# Patient Record
Sex: FEMALE | Race: WHITE | NOT HISPANIC OR LATINO | Employment: FULL TIME | URBAN - METROPOLITAN AREA
[De-identification: names, ages, dates, MRNs, and addresses within clinical notes are randomized per-mention and may not be internally consistent; named-entity substitution may affect disease eponyms.]

---

## 2017-02-28 DIAGNOSIS — I10 ESSENTIAL HYPERTENSION, BENIGN: ICD-10-CM

## 2017-02-28 NOTE — TELEPHONE ENCOUNTER
atenolol (TENORMIN) 50 MG      Last Written Prescription Date: 12/28/15  Last Fill Quantity: 90, # refills: 3    Last Office Visit with [facility], [facility] or [facility] prescribing provider:  5/9/16   Future Office Visit:        BP Readings from Last 3 Encounters:   05/09/16 122/66   12/01/15 116/70   07/22/15 118/82

## 2017-03-01 RX ORDER — ATENOLOL 50 MG/1
50 TABLET ORAL DAILY
Qty: 30 TABLET | Refills: 0 | Status: SHIPPED | OUTPATIENT
Start: 2017-03-01 | End: 2017-09-19

## 2017-03-01 NOTE — TELEPHONE ENCOUNTER
LM on VM to call back. Pt. Needs labs and OV.     Medication is being filled for 1 time refill only due to:  Future labs ordered and requires 3 month f/u from last Virtual visit in Nov.     Prescription approved per [facility] Refill Protocol.      Krista Boyle, RN, BSN, PHN

## 2017-03-20 ENCOUNTER — OFFICE VISIT (OUTPATIENT)
Dept: FAMILY MEDICINE | Facility: CLINIC | Age: 57
End: 2017-03-20
Payer: COMMERCIAL

## 2017-03-20 VITALS
HEIGHT: 69 IN | SYSTOLIC BLOOD PRESSURE: 122 MMHG | HEART RATE: 65 BPM | WEIGHT: 220 LBS | RESPIRATION RATE: 12 BRPM | BODY MASS INDEX: 32.58 KG/M2 | DIASTOLIC BLOOD PRESSURE: 71 MMHG | TEMPERATURE: 98.1 F

## 2017-03-20 DIAGNOSIS — H65.00 ACUTE SEROUS OTITIS MEDIA, RECURRENCE NOT SPECIFIED, UNSPECIFIED LATERALITY: Primary | ICD-10-CM

## 2017-03-20 PROCEDURE — 99213 OFFICE O/P EST LOW 20 MIN: CPT | Performed by: FAMILY MEDICINE

## 2017-03-20 RX ORDER — AZITHROMYCIN 250 MG/1
TABLET, FILM COATED ORAL
Qty: 6 TABLET | Refills: 0 | Status: SHIPPED | OUTPATIENT
Start: 2017-03-20 | End: 2017-04-07

## 2017-03-20 NOTE — MR AVS SNAPSHOT
After Visit Summary   3/20/2017    Felisha Gorman    MRN: [id number]           Patient Information     Date Of Birth          1960        Visit Information        Provider Department      3/20/2017 5:15 PM Flex Simpson MD [facility]        Today's Diagnoses     Acute serous otitis media, recurrence not specified, unspecified laterality    -  1       Follow-ups after your visit        Your next 10 appointments already scheduled     Mar 24, 2017  4:10 PM CDT   SHORT with Aubrie Antoine MD   [facility]    [address] [address] [phone]              Who to contact     If you have questions or need follow up information about today's clinic visit or your schedule please contact [facility] directly at [phone].  Normal or non-critical lab and imaging results will be communicated to you by MyChart, letter or phone within 4 business days after the clinic has received the results. If you do not hear from us within 7 days, please contact the clinic through MyChart or phone. If you have a critical or abnormal lab result, we will notify you by phone as soon as possible.  Submit refill requests through [employer] or call your pharmacy and they will forward the refill request to us. Please allow 3 business days for your refill to be completed.          Additional Information About Your Visit        MyChart Information     [employer] gives you secure access to your electronic health record. If you see a primary care provider, you can also send messages to your care team and make appointments. If you have questions, please call your primary care clinic.  If you do not have a primary care provider, please call [phone] and they will assist you.        Care EveryWhere ID     This is your Care EveryWhere ID. This could be used by other organizations to access your [facility]  "records  [id number]        Your Vitals Were     Pulse Temperature Respirations Height Last Period BMI (Body Mass Index)    65 98.1  F (36.7  C) (Oral) 12 5' 9\" (1.753 m) 09/29/2006 32.49 kg/m2       Blood Pressure from Last 3 Encounters:   03/20/17 122/71   05/09/16 122/66   12/01/15 116/70    Weight from Last 3 Encounters:   03/20/17 220 lb (99.8 kg)   05/09/16 229 lb (103.9 kg)   12/01/15 230 lb (104.3 kg)              Today, you had the following     No orders found for display         Today's Medication Changes          These changes are accurate as of: 3/20/17  5:18 PM.  If you have any questions, ask your nurse or doctor.               Start taking these medicines.        Dose/Directions    azithromycin 250 MG tablet   Commonly known as:  ZITHROMAX   Used for:  Acute serous otitis media, recurrence not specified, unspecified laterality   Started by:  Flex Simpson MD        Two tablets first day, then one tablet daily for four days.   Quantity:  6 tablet   Refills:  0            Where to get your medicines      These medications were sent to [facility] [address] [place] [address]  AT [address] [address]     Phone:  [phone]     azithromycin 250 MG tablet                Primary Care Provider Office Phone # Fax #    Aubrie Antoine [phone]       [facility] [address] [facility] [address]        Thank you!     Thank you for choosing [facility]  for your care. Our goal is always to provide you with excellent care. Hearing back from our patients is one way we can continue to improve our services. Please take a few minutes to complete the written survey that you may receive in the mail after your visit with us. Thank you!             Your Updated Medication List - Protect others around you: Learn how to safely use, store and throw away your medicines at " www.disposemymeds.org.          This list is accurate as of: 3/20/17  5:18 PM.  Always use your most recent med list.                   Brand Name Dispense Instructions for use    albuterol 108 (90 BASE) MCG/ACT Inhaler    PROAIR HFA/PROVENTIL HFA/VENTOLIN HFA    1 Inhaler    Inhale 2 puffs into the lungs every 6 hours as needed for shortness of breath / dyspnea or wheezing       atenolol 50 MG tablet    TENORMIN    30 tablet    Take 1 tablet (50 mg) by mouth daily       azithromycin 250 MG tablet    ZITHROMAX    6 tablet    Two tablets first day, then one tablet daily for four days.       buPROPion 150 MG 24 hr tablet    WELLBUTRIN XL    90 tablet    Take 1 tablet (150 mg) by mouth every morning       FLUoxetine 20 MG capsule    PROzac    90 capsule    Take 1 capsule (20 mg) by mouth daily       fluticasone 220 MCG/ACT Inhaler    FLOVENT HFA    1 Inhaler    2 puffs bid and rinse and spit after use       fluticasone 50 MCG/ACT spray    FLONASE    3 Package    Spray 1-2 sprays into both nostrils daily       triamterene-hydrochlorothiazide 37.5-25 MG per capsule    DYAZIDE    30 capsule    Take one tablet by mouth daily

## 2017-03-20 NOTE — PROGRESS NOTES
SUBJECTIVE:                                                    Felisha Gorman is a 57 year old female who presents to clinic today for the following health issues:      RESPIRATORY SYMPTOMS      Duration: 1 month    Description  nasal congestion, cough, chills, ear pain right and headache    Severity: 5/10    Accompanying signs and symptoms: can't hear out of the right ear    History (predisposing factors):  strep exposure 1 month ago    Precipitating or alleviating factors: None    Therapies tried and outcome:  Doxycycline       Past Medical History   Diagnosis Date     Allergy, unspecified not elsewhere classified      Dysthymic disorder      Essential hypertension, benign      flight anxiety      Frequent BRONCHITIS      usually in winter     Impaired fasting glucose 9/23/2007     Glucose 101 9/07     Mild intermittent asthma      URI induced     Obstructive sleep apnea (adult) (pediatric)      not on CPAP; feels rested 2014     Other and unspecified hyperlipidemia      Rheumatic fever without mention of heart involvement      no sequelae     transient neurologic sx 2005     ? TIA; had MRI with temporal lobe lesion that is getting smaller       Past Surgical History   Procedure Laterality Date     C nonspecific procedure  1/02     nasal polyp removed     Surgical history of -   1/06     endometrial ablation     Tonsillectomy       Colonoscopy  7/09     hyperplastic polyp; repeat 10 years       Family History   Problem Relation Age of Onset     C.A.D. Brother      age 50     HEART DISEASE Brother      C.A.D. Mother      HEART DISEASE Mother      CANCER Mother      ovarian OK now     Cancer - colorectal Mother      cancerous polyp     C.A.D. Father      MI     HEART DISEASE Father        Social History   Substance Use Topics     Smoking status: Former Smoker     Packs/day: 2.00     Years: 10.00     Types: Cigarettes     Quit date: 12/24/1986     Smokeless tobacco: Never Used      Comment: many years ago      Alcohol use No     Patient complains of congestion with sore throat, nasal congestion and sinus pain. Some mucopurulant discharge but no upper dental pain and no sustained fever. Duration less than three weeks or more.  Has history of airborn allergy or asthma.   No chest pain, diaphoresis, or sob.  minimallyproductive cough.  TMs dull not *red, sinus tenderness right   lungs clear, s1s2,soft abd,no distress, cyanosis or stridor.  A:URI with asom right ear   P:fluids, antipyretics,rest and zpak as directed.  Recheck PRN worsening or non-improvement over 5 days.  Discussed signs of systemic or constutional illness.

## 2017-03-20 NOTE — NURSING NOTE
"Chief Complaint   Patient presents with     URI       Initial /71 (BP Location: Right arm, Patient Position: Chair, Cuff Size: Adult Large)  Pulse 65  Temp 98.1  F (36.7  C) (Oral)  Resp 12  Ht 5' 9\" (1.753 m)  Wt 220 lb (99.8 kg)  LMP 09/29/2006  BMI 32.49 kg/m2 Estimated body mass index is 32.49 kg/(m^2) as calculated from the following:    Height as of this encounter: 5' 9\" (1.753 m).    Weight as of this encounter: 220 lb (99.8 kg).  Medication Reconciliation: complete   Dennys Nuñez CMA       "

## 2017-04-07 ENCOUNTER — OFFICE VISIT (OUTPATIENT)
Dept: FAMILY MEDICINE | Facility: CLINIC | Age: 57
End: 2017-04-07
Payer: COMMERCIAL

## 2017-04-07 VITALS
WEIGHT: 232.3 LBS | RESPIRATION RATE: 16 BRPM | HEIGHT: 69 IN | HEART RATE: 67 BPM | OXYGEN SATURATION: 97 % | SYSTOLIC BLOOD PRESSURE: 136 MMHG | DIASTOLIC BLOOD PRESSURE: 82 MMHG | TEMPERATURE: 97.7 F | BODY MASS INDEX: 34.41 KG/M2

## 2017-04-07 DIAGNOSIS — R73.01 IMPAIRED FASTING GLUCOSE: ICD-10-CM

## 2017-04-07 DIAGNOSIS — E87.6 HYPOKALEMIA: ICD-10-CM

## 2017-04-07 DIAGNOSIS — I10 HYPERTENSION GOAL BP (BLOOD PRESSURE) < 140/90: ICD-10-CM

## 2017-04-07 DIAGNOSIS — F33.41 RECURRENT MAJOR DEPRESSIVE DISORDER, IN PARTIAL REMISSION (H): Primary | ICD-10-CM

## 2017-04-07 DIAGNOSIS — F41.1 ANXIETY STATE: ICD-10-CM

## 2017-04-07 DIAGNOSIS — E66.9 NON MORBID OBESITY, UNSPECIFIED OBESITY TYPE: ICD-10-CM

## 2017-04-07 LAB — HBA1C MFR BLD: 5.9 % (ref 4.3–6)

## 2017-04-07 PROCEDURE — 36415 COLL VENOUS BLD VENIPUNCTURE: CPT | Performed by: FAMILY MEDICINE

## 2017-04-07 PROCEDURE — 80053 COMPREHEN METABOLIC PANEL: CPT | Performed by: FAMILY MEDICINE

## 2017-04-07 PROCEDURE — 82043 UR ALBUMIN QUANTITATIVE: CPT | Performed by: FAMILY MEDICINE

## 2017-04-07 PROCEDURE — 83036 HEMOGLOBIN GLYCOSYLATED A1C: CPT | Performed by: FAMILY MEDICINE

## 2017-04-07 PROCEDURE — 99214 OFFICE O/P EST MOD 30 MIN: CPT | Performed by: FAMILY MEDICINE

## 2017-04-07 RX ORDER — METFORMIN HCL 500 MG
500 TABLET, EXTENDED RELEASE 24 HR ORAL
Qty: 60 TABLET | Refills: 0 | Status: SHIPPED | OUTPATIENT
Start: 2017-04-07 | End: 2018-04-12

## 2017-04-07 RX ORDER — FLUOXETINE 40 MG/1
40 CAPSULE ORAL DAILY
Qty: 30 CAPSULE | Refills: 0 | Status: SHIPPED | OUTPATIENT
Start: 2017-04-07 | End: 2017-09-19

## 2017-04-07 ASSESSMENT — ANXIETY QUESTIONNAIRES
GAD7 TOTAL SCORE: 4
6. BECOMING EASILY ANNOYED OR IRRITABLE: SEVERAL DAYS
7. FEELING AFRAID AS IF SOMETHING AWFUL MIGHT HAPPEN: NOT AT ALL
2. NOT BEING ABLE TO STOP OR CONTROL WORRYING: SEVERAL DAYS
IF YOU CHECKED OFF ANY PROBLEMS ON THIS QUESTIONNAIRE, HOW DIFFICULT HAVE THESE PROBLEMS MADE IT FOR YOU TO DO YOUR WORK, TAKE CARE OF THINGS AT HOME, OR GET ALONG WITH OTHER PEOPLE: SOMEWHAT DIFFICULT
1. FEELING NERVOUS, ANXIOUS, OR ON EDGE: SEVERAL DAYS
3. WORRYING TOO MUCH ABOUT DIFFERENT THINGS: NOT AT ALL
5. BEING SO RESTLESS THAT IT IS HARD TO SIT STILL: NOT AT ALL

## 2017-04-07 ASSESSMENT — PATIENT HEALTH QUESTIONNAIRE - PHQ9: 5. POOR APPETITE OR OVEREATING: SEVERAL DAYS

## 2017-04-07 NOTE — NURSING NOTE
"No chief complaint on file.      Initial /82 (BP Location: Right arm, Patient Position: Chair, Cuff Size: Adult Large)  Pulse 67  Temp 97.7  F (36.5  C) (Oral)  Resp 16  Ht 5' 9\" (1.753 m)  Wt 232 lb 4.8 oz (105.4 kg)  LMP 09/29/2006  SpO2 97%  BMI 34.3 kg/m2 Estimated body mass index is 34.3 kg/(m^2) as calculated from the following:    Height as of this encounter: 5' 9\" (1.753 m).    Weight as of this encounter: 232 lb 4.8 oz (105.4 kg).  Medication Reconciliation: complete   Flori Cole, CMA      "

## 2017-04-07 NOTE — PATIENT INSTRUCTIONS
I would like to see you again in 3-4 weeks.    I will let you know about your labs on MyChart as well.

## 2017-04-07 NOTE — MR AVS SNAPSHOT
After Visit Summary   4/7/2017    Felisha Gorman    MRN: [id number]           Patient Information     Date Of Birth          1960        Visit Information        Provider Department      4/7/2017 4:10 PM Aubrie Antoine MD [facility]        Today's Diagnoses     Major depressive disorder with single episode, in partial remission (H)    -  1    Impaired fasting glucose          Care Instructions    I would like to see you again in 3-4 weeks.    [facility] will [facility].            Follow-ups after your visit        Who to contact     If you have questions or need follow up information about today's clinic visit or your schedule please contact [facility] directly at [phone].  Normal or non-critical lab and imaging results will be communicated to you by Paper[employer]hart, letter or phone within 4 business days after the clinic has received the results. If you do not hear from us within 7 days, please contact the clinic through [employer] or phone. If you have a critical or abnormal lab result, we will notify you by phone as soon as possible.  Submit refill requests through [employer] or call your pharmacy and they will forward the refill request to us. Please allow 3 business days for your refill to be completed.          Additional Information About Your Visit        [employer]x Information     [employer] gives you secure access to your electronic health record. If you see a primary care provider, you can also send messages to your care team and make appointments. If you have questions, please call your primary care clinic.  If you do not have a primary care provider, please call [phone] and they will assist you.        Care EveryWhere ID     This is your Care EveryWhere ID. This could be used by other organizations to access your [facility] medical records  [id number]        Your Vitals Were     Pulse Temperature Respirations Height Last Period Pulse  "Oximetry    67 97.7  F (36.5  C) (Oral) 16 5' 9\" (1.753 m) 09/29/2006 97%    BMI (Body Mass Index)                   34.3 kg/m2            Blood Pressure from Last 3 Encounters:   04/07/17 136/82   03/20/17 122/71   05/09/16 122/66    Weight from Last 3 Encounters:   04/07/17 232 lb 4.8 oz (105.4 kg)   03/20/17 220 lb (99.8 kg)   05/09/16 229 lb (103.9 kg)              We Performed the Following     DEPRESSION ACTION PLAN (DAP)          Today's Medication Changes          These changes are accurate as of: 4/7/17  5:03 PM.  If you have any questions, ask your nurse or doctor.               Start taking these medicines.        Dose/Directions    metFORMIN 500 MG 24 hr tablet   Commonly known as:  GLUCOPHAGE-XR   Used for:  Impaired fasting glucose   Started by:  Aubrie Antoine MD        Dose:  500 mg   Take 1 tablet (500 mg) by mouth daily (with dinner) If doing well after 2 weeks, may increase to 2 tabs with dinner.   Quantity:  60 tablet   Refills:  0         These medicines have changed or have updated prescriptions.        Dose/Directions    FLUoxetine 40 MG capsule   Commonly known as:  PROzac   This may have changed:    - medication strength  - how much to take   Used for:  Major depressive disorder with single episode, in partial remission (H)   Changed by:  Aubrie Antoine MD        Dose:  40 mg   Take 1 capsule (40 mg) by mouth daily   Quantity:  30 capsule   Refills:  0       fluticasone 50 MCG/ACT spray   Commonly known as:  FLONASE   This may have changed:    - when to take this  - reasons to take this   Used for:  Allergy, unspecified not elsewhere classified        Dose:  1-2 spray   Spray 1-2 sprays into both nostrils daily   Quantity:  3 Package   Refills:  3         Stop taking these medicines if you haven't already. Please contact your care team if you have questions.     buPROPion 150 MG 24 hr tablet   Commonly known as:  WELLBUTRIN XL   Stopped by:  Aubrie Antoine MD                Where to get your " medicines      These medications were sent to [id number] - [place], [place] - [address] [facility] [address]  [address], [facility] [address]     Phone:  [phone]     FLUoxetine 40 MG capsule    metFORMIN 500 MG 24 hr tablet                Primary Care Provider Office Phone # Fax #    Aubrie Antoine [phone]       [facility] [address]  [facility] [address]        Thank you!     Thank you for choosing [facility]  for your care. Our goal is always to provide you with excellent care. Hearing back from our patients is one way we can continue to improve our services. Please take a few minutes to complete the written survey that you may receive in the mail after your visit with us. Thank you!             Your Updated Medication List - Protect others around you: Learn how to safely use, store and throw away your medicines at www.disposemymeds.org.          This list is accurate as of: 4/7/17  5:03 PM.  Always use your most recent med list.                   Brand Name Dispense Instructions for use    albuterol 108 (90 BASE) MCG/ACT Inhaler    PROAIR HFA/PROVENTIL HFA/VENTOLIN HFA    1 Inhaler    Inhale 2 puffs into the lungs every 6 hours as needed for shortness of breath / dyspnea or wheezing       atenolol 50 MG tablet    TENORMIN    30 tablet    Take 1 tablet (50 mg) by mouth daily       FLUoxetine 40 MG capsule    PROzac    30 capsule    Take 1 capsule (40 mg) by mouth daily       fluticasone 220 MCG/ACT Inhaler    FLOVENT HFA    1 Inhaler    2 puffs bid and rinse and spit after use       fluticasone 50 MCG/ACT spray    FLONASE    3 Package    Spray 1-2 sprays into both nostrils daily       metFORMIN 500 MG 24 hr tablet    GLUCOPHAGE-XR    60 tablet    Take 1 tablet (500 mg) by mouth daily (with dinner) If doing well after 2 weeks, may increase to 2 tabs with dinner.       triamterene-hydrochlorothiazide 37.5-25  MG per capsule    DYAZIDE    30 capsule    Take one tablet by mouth daily

## 2017-04-07 NOTE — LETTER
My Depression Action Plan  Name: Felisha Gorman   Date of Birth 1960  Date: 4/7/2017    My doctor: Aubrie Antoine   My clinic: [facility]  [address] [place] [address] [phone]          GREEN    ZONE   Good Control    What it looks like:     Things are going generally well. You have normal up s and down s. You may even feel depressed from time to time, but bad moods usually last less than a day.   What you need to do:  1. Continue to care for yourself (see self care plan)  2. Check your depression survival kit and update it as needed  3. Follow your physician s recommendations including any medication.  4. Do not stop taking medication unless you consult with your physician first.           YELLOW         ZONE Getting Worse    What it looks like:     Depression is starting to interfere with your life.     It may be hard to get out of bed; you may be starting to isolate yourself from others.    Symptoms of depression are starting to last most all day and this has happened for several days.     You may have suicidal thoughts but they are not constant.   What you need to do:     1. Call your care team, your response to treatment will improve if you keep your care team informed of your progress. Yellow periods are signs an adjustment may need to be made.     2. Continue your self-care, even if you have to fake it!    3. Talk to someone in your support network    4. Open up your depression survival kit           RED    ZONE Medical Alert - Get Help    What it looks like:     Depression is seriously interfering with your life.     You may experience these or other symptoms: You can t get out of bed most days, can t work or engage in other necessary activities, you have trouble taking care of basic hygiene, or basic responsibilities, thoughts of suicide or death that will not go away, self-injurious behavior.     What you need to do:  1. Call your care team and  request a same-day appointment. If they are not available (weekends or after hours) call your local crisis line, emergency room or 911.      Electronically signed by: Flori Cole, April 7, 2017    Depression Self Care Plan / Survival Kit    Self-Care for Depression  Here s the deal. Your body and mind are really not as separate as most people think.  What you do and think affects how you feel and how you feel influences what you do and think. This means if you do things that people who feel good do, it will help you feel better.  Sometimes this is all it takes.  There is also a place for medication and therapy depending on how severe your depression is, so be sure to consult with your medical provider and/ or Behavioral Health Consultant if your symptoms are worsening or not improving.     In order to better manage my stress, I will:    Exercise  Get some form of exercise, every day. This will help reduce pain and release endorphins, the  feel good  chemicals in your brain. This is almost as good as taking antidepressants!  This is not the same as joining a gym and then never going! (they count on that by the way ) It can be as simple as just going for a walk or doing some gardening, anything that will get you moving.      Hygiene   Maintain good hygiene (Get out of bed in the morning, Make your bed, Brush your teeth, Take a shower, and Get dressed like you were going to work, even if you are unemployed).  If your clothes don't fit try to get ones that do.    Diet  I will strive to eat foods that are good for me, drink plenty of water, and avoid excessive sugar, caffeine, alcohol, and other mood-altering substances.  Some foods that are helpful in depression are: complex carbohydrates, B vitamins, flaxseed, fish or fish oil, fresh fruits and vegetables.    Psychotherapy  I agree to participate in Individual Therapy (if recommended).    Medication  If prescribed medications, I agree to take them.  Missing doses  can result in serious side effects.  I understand that drinking alcohol, or other illicit drug use, may cause potential side effects.  I will not stop my medication abruptly without first discussing it with my provider.    Staying Connected With Others  I will stay in touch with my friends, family members, and my primary care provider/team.    Use your imagination  Be creative.  We all have a creative side; it doesn t matter if it s oil painting, sand castles, or mud pies! This will also kick up the endorphins.    Witness Beauty  (AKA stop and smell the roses) Take a look outside, even in mid-winter. Notice colors, textures. Watch the squirrels and birds.     Service to others  Be of service to others.  There is always someone else in need.  By helping others we can  get out of ourselves  and remember the really important things.  This also provides opportunities for practicing all the other parts of the program.    Humor  Laugh and be silly!  Adjust your TV habits for less news and crime-drama and more comedy.    Control your stress  Try breathing deep, massage therapy, biofeedback, and meditation. Find time to relax each day.     My support system    Clinic Contact:  Phone number:    Contact 1:  Phone number:    Contact 2:  Phone number:    Taoist/:  Phone number:    Therapist:  Phone number:    Local crisis center:    Phone number:    Other community support:  Phone number:

## 2017-04-07 NOTE — PROGRESS NOTES
SUBJECTIVE:                                                    Felisha Gorman is a 57 year old female who presents to clinic today for the following health issues:      Hypertension Follow-up      Outpatient blood pressures are not being checked.    Low Salt Diet: not monitoring salt-does not add salt       Depression and Anxiety Follow-Up    Status since last visit: No change    Other associated symptoms:None    Complicating factors:     Significant life event: Yes-  Life stressors- aging mother     Current substance abuse: None    PHQ-9 SCORE 5/9/2016 9/8/2016 11/29/2016   Total Score - - -   Total Score MyChart - 7 (Mild depression) -   Total Score 10 - 7     BOBBY-7 SCORE 12/1/2015 5/9/2016 11/29/2016   Total Score - - -   Total Score - - -   Total Score 3 4 1        PHQ-9  English      PHQ-9   Any Language     GAD7       Amount of exercise or physical activity: None    Problems taking medications regularly: No    Medication side effects: none    Diet: regular (no restrictions)          Problem list and histories reviewed & adjusted, as indicated.  Additional history:   See under ROS    Patient Active Problem List   Diagnosis     Allergic state     Dysthymic disorder     Mild intermittent asthma     Hypertension goal BP (blood pressure) < 140/90     Obstructive sleep apnea     Anxiety state     IMPAIRED FASTING GLUCOSE     Major depression in complete remission (H)     CARDIOVASCULAR SCREENING; LDL GOAL LESS THAN 160     Family history of ovarian cancer     Knee pain     Osteoarthritis, knee     Abnormal finding on MRI of brain     Radicular low back pain     Recurrent major depressive disorder, in partial remission (H)       Current Outpatient Prescriptions   Medication Sig Dispense Refill     triamterene-hydrochlorothiazide (DYAZIDE) 37.5-25 MG per capsule Take one tablet by mouth daily 30 capsule 0     atenolol (TENORMIN) 50 MG tablet Take 1 tablet (50 mg) by mouth daily 30 tablet 0     FLUoxetine (PROZAC)  "20 MG capsule Take 1 capsule (20 mg) by mouth daily 90 capsule 0     albuterol (PROAIR HFA, PROVENTIL HFA, VENTOLIN HFA) 108 (90 BASE) MCG/ACT inhaler Inhale 2 puffs into the lungs every 6 hours as needed for shortness of breath / dyspnea or wheezing 1 Inhaler 1     fluticasone (FLONASE) 50 MCG/ACT nasal spray Spray 1-2 sprays into both nostrils daily (Patient taking differently: Spray 1-2 sprays into both nostrils as needed ) 3 Package 3     fluticasone (FLOVENT HFA) 220 MCG/ACT inhaler 2 puffs bid and rinse and spit after use 1 Inhaler prn             Reviewed and updated as needed this visit by clinical staff  Tobacco  Allergies  Meds  Med Hx  Surg Hx  Fam Hx  Soc Hx      Reviewed and updated as needed this visit by Provider         ROS:  CONSTITUTIONAL:frustrated with difficulty losing weight.   RESP:NEGATIVE for significant cough or short of breath x lingering cough after cold.  CV: NEGATIVE for chest pain, palpitations or peripheral edema  PSYCHIATRIC: see below.    wellbutrin made her feel more anxious. So went off it.   Feeling a little better off it.  Still not feeling real motivated. Feeling blah. Not looking forward to anything.  Working hard.   Stressful job.   Feeling like it is chemical. Feeling the fluoxetine helping for her anxiety.    Still seeing a therapist.    OBJECTIVE:                                                    /82 (BP Location: Right arm, Patient Position: Chair, Cuff Size: Adult Large)  Pulse 67  Temp 97.7  F (36.5  C) (Oral)  Resp 16  Ht 5' 9\" (1.753 m)  Wt 232 lb 4.8 oz (105.4 kg)  LMP 09/29/2006  SpO2 97%  BMI 34.3 kg/m2  Body mass index is 34.3 kg/(m^2).  GENERAL APPEARANCE: alert and no distress  RESP: lungs clear to auscultation - no rales, rhonchi or wheezes  CV: regular rates and rhythm  MS: no edema.  PSYCH: mentation appears normal and affect normal/bright      PHQ-9 SCORE 9/8/2016 11/29/2016 4/7/2017   Total Score - - -   Total Score MyChart 7 (Mild " depression) - -   Total Score - 7 12       BOBBY-7 SCORE 5/9/2016 11/29/2016 4/7/2017   Total Score - - -   Total Score - - -   Total Score 4 1 4       GFR Estimate   Date Value Ref Range Status   12/01/2015 85 >60 mL/min/1.7m2 Final     Comment:     Non  GFR Calc   03/25/2014 78 >60 mL/min/1.7m2 Final   12/19/2012 82 >60 mL/min/1.7m2 Final         Lab Results   Component Value Date    A1C 6.5 12/01/2015    A1C 6.0 03/25/2014    A1C 6.0 08/19/2011    A1C 5.5 05/24/2008       Recent Labs   Lab Test  12/01/15   1054  03/25/14   0840  08/19/11   0824   CHOL  264*  175  223*   HDL  39*  32*  35*   LDL  161*  100  144*   TRIG  322*  217*  223*   CHOLHDLRATIO   --   5.5*  6.4*          ASSESSMENT/PLAN:                                                      Recurrent major depressive disorder, in partial remission (H)  At this time, discussed options. (increase dose, different medication (discussed other SSRI and SNRI), collaborative care). Currently will try increased dose.   - FLUoxetine (PROZAC) 40 MG capsule; Take 1 capsule (40 mg) by mouth daily    Anxiety state  Feels the fluoxetine is helpful. More anxious when on the wellbutrin too.     Impaired fasting glucose  Will recheck.  Discussed option of metformin; she did like the idea with regards to weight as well. Wanted to go ahead and start.   Discussed metformin in detail; discussing side effects including diarrhea and risk including lactic acidosis and its precipitants. Recommend holding med if getting dehydrated or with dye study.  - metFORMIN (GLUCOPHAGE-XR) 500 MG 24 hr tablet; Take 1 tablet (500 mg) by mouth daily (with dinner) If doing well after 2 weeks, may increase to 2 tabs with dinner.  - Comprehensive metabolic panel  - Hemoglobin A1c    Non morbid obesity, unspecified obesity type  As above.   - metFORMIN (GLUCOPHAGE-XR) 500 MG 24 hr tablet; Take 1 tablet (500 mg) by mouth daily (with dinner) If doing well after 2 weeks, may increase to 2  tabs with dinner.    Hypertension goal BP (blood pressure) < 140/90  Meeting goal. She notes this is a little high for her. Will continue to follow.  - Comprehensive metabolic panel  - Microalbumin quantitative random urine        Patient Instructions   I would like to see you again in 3-4 weeks.    I will let you know about your labs on MyChart as well.          Aubrie Antoine MD, MD  [facility]

## 2017-04-08 ENCOUNTER — TELEPHONE (OUTPATIENT)
Dept: FAMILY MEDICINE | Facility: CLINIC | Age: 57
End: 2017-04-08

## 2017-04-08 DIAGNOSIS — E87.6 HYPOKALEMIA: Primary | ICD-10-CM

## 2017-04-08 LAB
ALBUMIN SERPL-MCNC: 3.9 G/DL (ref 3.4–5)
ALP SERPL-CCNC: 100 U/L (ref 40–150)
ALT SERPL W P-5'-P-CCNC: 42 U/L (ref 0–50)
ANION GAP SERPL CALCULATED.3IONS-SCNC: 10 MMOL/L (ref 3–14)
AST SERPL W P-5'-P-CCNC: 18 U/L (ref 0–45)
BILIRUB SERPL-MCNC: 0.3 MG/DL (ref 0.2–1.3)
BUN SERPL-MCNC: 20 MG/DL (ref 7–30)
CALCIUM SERPL-MCNC: 9.2 MG/DL (ref 8.5–10.1)
CHLORIDE SERPL-SCNC: 101 MMOL/L (ref 94–109)
CO2 SERPL-SCNC: 27 MMOL/L (ref 20–32)
CREAT SERPL-MCNC: 0.76 MG/DL (ref 0.52–1.04)
CREAT UR-MCNC: 142 MG/DL
GFR SERPL CREATININE-BSD FRML MDRD: 78 ML/MIN/1.7M2
GLUCOSE SERPL-MCNC: 74 MG/DL (ref 70–99)
MICROALBUMIN UR-MCNC: 21 MG/L
MICROALBUMIN/CREAT UR: 15 MG/G CR (ref 0–25)
POTASSIUM SERPL-SCNC: 3 MMOL/L (ref 3.4–5.3)
PROT SERPL-MCNC: 7.9 G/DL (ref 6.8–8.8)
SODIUM SERPL-SCNC: 138 MMOL/L (ref 133–144)

## 2017-04-08 RX ORDER — POTASSIUM CHLORIDE 1500 MG/1
20 TABLET, EXTENDED RELEASE ORAL 2 TIMES DAILY
Qty: 14 TABLET | Refills: 0 | Status: SHIPPED | OUTPATIENT
Start: 2017-04-08 | End: 2017-12-03

## 2017-04-08 ASSESSMENT — PATIENT HEALTH QUESTIONNAIRE - PHQ9: SUM OF ALL RESPONSES TO PHQ QUESTIONS 1-9: 12

## 2017-04-08 ASSESSMENT — ASTHMA QUESTIONNAIRES: ACT_TOTALSCORE: 18

## 2017-04-08 ASSESSMENT — ANXIETY QUESTIONNAIRES: GAD7 TOTAL SCORE: 4

## 2017-04-08 NOTE — TELEPHONE ENCOUNTER
Please call her.     I did send a MyChart result note.  I want to make sure she saw it.    Her potassium was low and sent a prescription for taking some for a week. We can do her first follow up as she comes in for appointment in 3-4 weeks.    Thanks!

## 2017-04-10 PROBLEM — E66.9 OBESITY: Status: ACTIVE | Noted: 2017-04-10

## 2017-04-11 PROBLEM — E66.9 NON MORBID OBESITY, UNSPECIFIED OBESITY TYPE: Status: ACTIVE | Noted: 2017-04-11

## 2017-04-11 NOTE — TELEPHONE ENCOUNTER
Patient has not reviewed the my chart message.  Called patient to let her know lab results, and need to have potassium medication and lab recheck.   Herlinda Cesar, RN  Triage Nurse

## 2017-05-23 ENCOUNTER — TELEPHONE (OUTPATIENT)
Dept: FAMILY MEDICINE | Facility: CLINIC | Age: 57
End: 2017-05-23

## 2017-06-08 ENCOUNTER — OFFICE VISIT (OUTPATIENT)
Dept: FAMILY MEDICINE | Facility: CLINIC | Age: 57
End: 2017-06-08
Payer: COMMERCIAL

## 2017-06-08 VITALS
WEIGHT: 236.8 LBS | OXYGEN SATURATION: 96 % | HEART RATE: 56 BPM | SYSTOLIC BLOOD PRESSURE: 122 MMHG | HEIGHT: 69 IN | BODY MASS INDEX: 35.07 KG/M2 | TEMPERATURE: 97.7 F | DIASTOLIC BLOOD PRESSURE: 74 MMHG

## 2017-06-08 DIAGNOSIS — F32.5 MAJOR DEPRESSION IN COMPLETE REMISSION (H): Primary | ICD-10-CM

## 2017-06-08 DIAGNOSIS — I10 HYPERTENSION GOAL BP (BLOOD PRESSURE) < 140/90: ICD-10-CM

## 2017-06-08 DIAGNOSIS — E87.6 HYPOKALEMIA: ICD-10-CM

## 2017-06-08 DIAGNOSIS — F41.1 ANXIETY STATE: ICD-10-CM

## 2017-06-08 DIAGNOSIS — J45.20 MILD INTERMITTENT ASTHMA WITHOUT COMPLICATION: ICD-10-CM

## 2017-06-08 PROCEDURE — 99214 OFFICE O/P EST MOD 30 MIN: CPT | Performed by: FAMILY MEDICINE

## 2017-06-08 PROCEDURE — 84132 ASSAY OF SERUM POTASSIUM: CPT | Performed by: FAMILY MEDICINE

## 2017-06-08 PROCEDURE — 36415 COLL VENOUS BLD VENIPUNCTURE: CPT | Performed by: FAMILY MEDICINE

## 2017-06-08 ASSESSMENT — ANXIETY QUESTIONNAIRES
6. BECOMING EASILY ANNOYED OR IRRITABLE: NOT AT ALL
IF YOU CHECKED OFF ANY PROBLEMS ON THIS QUESTIONNAIRE, HOW DIFFICULT HAVE THESE PROBLEMS MADE IT FOR YOU TO DO YOUR WORK, TAKE CARE OF THINGS AT HOME, OR GET ALONG WITH OTHER PEOPLE: NOT DIFFICULT AT ALL
3. WORRYING TOO MUCH ABOUT DIFFERENT THINGS: NOT AT ALL
2. NOT BEING ABLE TO STOP OR CONTROL WORRYING: NOT AT ALL
GAD7 TOTAL SCORE: 0
5. BEING SO RESTLESS THAT IT IS HARD TO SIT STILL: NOT AT ALL
7. FEELING AFRAID AS IF SOMETHING AWFUL MIGHT HAPPEN: NOT AT ALL
1. FEELING NERVOUS, ANXIOUS, OR ON EDGE: NOT AT ALL

## 2017-06-08 ASSESSMENT — PATIENT HEALTH QUESTIONNAIRE - PHQ9: 5. POOR APPETITE OR OVEREATING: NOT AT ALL

## 2017-06-08 NOTE — MR AVS SNAPSHOT
"              After Visit Summary   6/8/2017    Felisha Gorman    MRN: [id number]           Patient Information     Date Of Birth          1960        Visit Information        Provider Department      6/8/2017 4:10 PM Aubrie Antoine MD [facility] [place]        Today's Diagnoses     Hypokalemia           Follow-ups after your visit        Who to contact     If you have questions or need follow up information about today's clinic visit or your schedule please contact [facility] directly at [phone].  Normal or non-critical lab and imaging results will be communicated to you by St[employer], letter or phone within 4 business days after the clinic has received the results. If you do not hear from us within 7 days, please contact the clinic through [employer] or phone. If you have a critical or abnormal lab result, we will notify you by phone as soon as possible.  Submit refill requests through [employer] or call your pharmacy and they will forward the refill request to us. Please allow 3 business days for your refill to be completed.          Additional Information About Your Visit        MyChart Information     [employer] gives you secure access to your electronic health record. If you see a primary care provider, you can also send messages to your care team and make appointments. If you have questions, please call your primary care clinic.  If you do not have a primary care provider, please call [phone] and they will assist you.        Care EveryWhere ID     This is your Care EveryWhere ID. This could be used by other organizations to access your [facility] medical records  [id number]        Your Vitals Were     Pulse Temperature Height Last Period Pulse Oximetry BMI (Body Mass Index)    56 97.7  F (36.5  C) (Oral) 5' 9\" (1.753 m) 09/29/2006 96% 34.97 kg/m2       Blood Pressure from Last 3 Encounters:   06/08/17 122/74   04/07/17 136/82   03/20/17 122/71    Weight from Last 3 Encounters: "   06/08/17 236 lb 12.8 oz (107.4 kg)   04/07/17 232 lb 4.8 oz (105.4 kg)   03/20/17 220 lb (99.8 kg)              We Performed the Following     Potassium          Today's Medication Changes          These changes are accurate as of: 6/8/17  4:56 PM.  If you have any questions, ask your nurse or doctor.               These medicines have changed or have updated prescriptions.        Dose/Directions    fluticasone 50 MCG/ACT spray   Commonly known as:  FLONASE   This may have changed:    - when to take this  - reasons to take this   Used for:  Allergy, unspecified not elsewhere classified        Dose:  1-2 spray   Spray 1-2 sprays into both nostrils daily   Quantity:  3 Package   Refills:  3                Primary Care Provider Office Phone # Fax #    Aubrie Antoine [phone]       [facility] [address] [facility] [address]        Thank you!     Thank you for choosing [facility]  for your care. Our goal is always to provide you with excellent care. Hearing back from our patients is one way we can continue to improve our services. Please take a few minutes to complete the written survey that you may receive in the mail after your visit with us. Thank you!             Your Updated Medication List - Protect others around you: Learn how to safely use, store and throw away your medicines at www.disposemymeds.org.          This list is accurate as of: 6/8/17  4:56 PM.  Always use your most recent med list.                   Brand Name Dispense Instructions for use    albuterol 108 (90 BASE) MCG/ACT Inhaler    PROAIR HFA/PROVENTIL HFA/VENTOLIN HFA    1 Inhaler    Inhale 2 puffs into the lungs every 6 hours as needed for shortness of breath / dyspnea or wheezing       atenolol 50 MG tablet    TENORMIN    30 tablet    Take 1 tablet (50 mg) by mouth daily       FLUoxetine 40 MG capsule    PROzac    30 capsule    Take 1 capsule (40 mg) by mouth daily       fluticasone 220  MCG/ACT Inhaler    FLOVENT HFA    1 Inhaler    2 puffs bid and rinse and spit after use       fluticasone 50 MCG/ACT spray    FLONASE    3 Package    Spray 1-2 sprays into both nostrils daily       metFORMIN 500 MG 24 hr tablet    GLUCOPHAGE-XR    60 tablet    Take 1 tablet (500 mg) by mouth daily (with dinner) If doing well after 2 weeks, may increase to 2 tabs with dinner.       potassium chloride SA 20 MEQ CR tablet    potassium chloride    14 tablet    Take 1 tablet (20 mEq) by mouth 2 times daily       triamterene-hydrochlorothiazide 37.5-25 MG per capsule    DYAZIDE    30 capsule    Take one tablet by mouth daily

## 2017-06-08 NOTE — LETTER
My Asthma Action Plan  Name: Felisha Gorman   YOB: 1960  Date: 6/8/2017   My doctor: Aubrie Antoine MD, MD   My clinic: [facility]        My Control Medicine: Fluticasone propionate (Flovent) -   mcg during a flare or bad season  My Rescue Medicine: Albuterol (Proair/Ventolin/Proventil) inhaler as needed.   My Asthma Severity: intermittent  Avoid your asthma triggers:                GREEN ZONE     Good Control    I feel good    No cough or wheeze    Can work, sleep and play without asthma symptoms       Take your asthma control medicine every day.     1. If exercise triggers your asthma, take your rescue medication    15 minutes before exercise or sports, and    During exercise if you have asthma symptoms  2. Spacer to use with inhaler: If you have a spacer, make sure to use it with your inhaler             YELLOW ZONE     Getting Worse  I have ANY of these:    I do not feel good    Cough or wheeze    Chest feels tight    Wake up at night   1. Keep taking your Green Zone medications  2. Start taking your rescue medicine:    every 20 minutes for up to 1 hour. Then every 4 hours for 24-48 hours.  3. If you stay in the Yellow Zone for more than 12-24 hours, contact your doctor.  4. If you do not return to the Green Zone in 12-24 hours or you get worse, start taking your oral steroid medicine if prescribed by your provider.           RED ZONE     Medical Alert - Get Help  I have ANY of these:    I feel awful    Medicine is not helping    Breathing getting harder    Trouble walking or talking    Nose opens wide to breathe       1. Take your rescue medicine NOW  2. If your provider has prescribed an oral steroid medicine, start taking it NOW  3. Call your doctor NOW  4. If you are still in the Red Zone after 20 minutes and you have not reached your doctor:    Take your rescue medicine again and    Call 911 or go to the emergency room right away    See your regular doctor within 2  weeks of an Emergency Room or Urgent Care visit for follow-up treatment.        Electronically signed by: Aubrie Antoine MD, June 8, 2017    Annual Reminders:  Meet with Asthma Educator,  Flu Shot in the Fall, consider Pneumonia Vaccination for patients with asthma (aged 19 and older).    Pharmacy:    [place], [place] - [address]  [id number] - [place], [place] - [address]  [facility] [address] [place] - [address] [facility] [address]  [email or web address] [address] [place], [place] - [address] [facility] [address]                    Asthma Triggers  How To Control Things That Make Your Asthma Worse    Triggers are things that make your asthma worse.  Look at the list below to help you find your triggers and what you can do about them.  You can help prevent asthma flare-ups by staying away from your triggers.      Trigger                                                          What you can do   Cigarette Smoke  Tobacco smoke can make asthma worse. Do not allow smoking in your home, car or around you.  Be sure no one smokes at a child s day care or school.  If you smoke, ask your health care provider for ways to help you quit.  Ask family members to quit too.  Ask your health care provider for a referral to Quit Plan to help you quit smoking, or call 7-734-045-PLAN.     Colds, Flu, Bronchitis  These are common triggers of asthma. Wash your hands often.  Don t touch your eyes, nose or mouth.  Get a flu shot every year.     Dust Mites  These are tiny bugs that live in cloth or carpet. They are too small to see. Wash sheets and blankets in hot water every week.   Encase pillows and mattress in dust mite proof covers.  Avoid having carpet if you can. If you have carpet, vacuum weekly.   Use a dust mask and HEPA vacuum.   Pollen and Outdoor Mold  Some people are allergic to trees, grass, or weed pollen, or molds. Try to  keep your windows closed.  Limit time out doors when pollen count is high.   Ask you health care provider about taking medicine during allergy season.     Animal Dander  Some people are allergic to skin flakes, urine or saliva from pets with fur or feathers. Keep pets with fur or feathers out of your home.    If you can t keep the pet outdoors, then keep the pet out of your bedroom.  Keep the bedroom door closed.  Keep pets off cloth furniture and away from stuffed toys.     Mice, Rats, and Cockroaches  Some people are allergic to the waste from these pests.   Cover food and garbage.  Clean up spills and food crumbs.  Store grease in the refrigerator.   Keep food out of the bedroom.   Indoor Mold  This can be a trigger if your home has high moisture. Fix leaking faucets, pipes, or other sources of water.   Clean moldy surfaces.  Dehumidify basement if it is damp and smelly.   Smoke, Strong Odors, and Sprays  These can reduce air quality. Stay away from strong odors and sprays, such as perfume, powder, hair spray, paints, smoke incense, paint, cleaning products, candles and new carpet.   Exercise or Sports  Some people with asthma have this trigger. Be active!  Ask your doctor about taking medicine before sports or exercise to prevent symptoms.    Warm up for 5-10 minutes before and after sports or exercise.     Other Triggers of Asthma  Cold air:  Cover your nose and mouth with a scarf.  Sometimes laughing or crying can be a trigger.  Some medicines and food can trigger asthma.

## 2017-06-08 NOTE — NURSING NOTE
"No chief complaint on file.      Initial /74 (BP Location: Right arm, Patient Position: Chair, Cuff Size: Adult Large)  Pulse 56  Temp 97.7  F (36.5  C) (Oral)  Ht 5' 9\" (1.753 m)  Wt 236 lb 12.8 oz (107.4 kg)  LMP 09/29/2006  SpO2 96%  BMI 34.97 kg/m2 Estimated body mass index is 34.97 kg/(m^2) as calculated from the following:    Height as of this encounter: 5' 9\" (1.753 m).    Weight as of this encounter: 236 lb 12.8 oz (107.4 kg).  Medication Reconciliation: complete   Flori Cole, CMA      "

## 2017-06-08 NOTE — PROGRESS NOTES
SUBJECTIVE:                                                    Felisha Gorman is a 57 year old female who presents to clinic today for the following health issues:      Hypertension Follow-up      Outpatient blood pressures are not being checked.    Low Salt Diet: no added salt       Depression and Anxiety Follow-Up    Status since last visit: Improved     Other associated symptoms:insomnia- improved    Complicating factors:     Significant life event: No     Current substance abuse: None    PHQ-9 SCORE 9/8/2016 11/29/2016 4/7/2017   Total Score - - -   Total Score MyChart 7 (Mild depression) - -   Total Score - 7 12     BOBBY-7 SCORE 5/9/2016 11/29/2016 4/7/2017   Total Score - - -   Total Score - - -   Total Score 4 1 4        PHQ-9  English      PHQ-9   Any Language     GAD7     Asthma Follow-Up    Was ACT completed today?      Amount of exercise or physical activity: None    Problems taking medications regularly: sometimes forgets    Medication side effects: none    Diet: regular (no restrictions)          Problem list and histories reviewed & adjusted, as indicated.  Additional history:   See under ROS     Patient Active Problem List   Diagnosis     Allergic state     Dysthymic disorder     Mild intermittent asthma     Hypertension goal BP (blood pressure) < 140/90     Obstructive sleep apnea     Anxiety state     IMPAIRED FASTING GLUCOSE     Major depression in complete remission (H)     CARDIOVASCULAR SCREENING; LDL GOAL LESS THAN 160     Family history of ovarian cancer     Knee pain     Osteoarthritis, knee     Abnormal finding on MRI of brain     Radicular low back pain     Recurrent major depressive disorder, in partial remission (H)     Obesity     Non morbid obesity, unspecified obesity type       Current Outpatient Prescriptions   Medication Sig Dispense Refill     potassium chloride SA (POTASSIUM CHLORIDE) 20 MEQ CR tablet Take 1 tablet (20 mEq) by mouth 2 times daily 14 tablet 0     FLUoxetine  "(PROZAC) 40 MG capsule Take 1 capsule (40 mg) by mouth daily 30 capsule 0     metFORMIN (GLUCOPHAGE-XR) 500 MG 24 hr tablet Take 1 tablet (500 mg) by mouth daily (with dinner) If doing well after 2 weeks, may increase to 2 tabs with dinner. 60 tablet 0     triamterene-hydrochlorothiazide (DYAZIDE) 37.5-25 MG per capsule Take one tablet by mouth daily 30 capsule 0     atenolol (TENORMIN) 50 MG tablet Take 1 tablet (50 mg) by mouth daily 30 tablet 0     albuterol (PROAIR HFA, PROVENTIL HFA, VENTOLIN HFA) 108 (90 BASE) MCG/ACT inhaler Inhale 2 puffs into the lungs every 6 hours as needed for shortness of breath / dyspnea or wheezing 1 Inhaler 1     fluticasone (FLONASE) 50 MCG/ACT nasal spray Spray 1-2 sprays into both nostrils daily (Patient taking differently: Spray 1-2 sprays into both nostrils as needed ) 3 Package 3     fluticasone (FLOVENT HFA) 220 MCG/ACT inhaler 2 puffs bid and rinse and spit after use 1 Inhaler prn             Reviewed and updated as needed this visit by clinical staff  Tobacco  Med Hx  Surg Hx  Fam Hx  Soc Hx      Reviewed and updated as needed this visit by Provider         ROS:  CONSTITUTIONAL:NEGATIVE for fever, chills, change in weight  RESP:NEGATIVE for significant cough or SOB  CV: NEGATIVE for chest pain, palpitations or peripheral edema  PSYCHIATRIC: see below.    Feeling better.  Did have a vacation as well over Memorial weekend.  High stress job.  Anticipating another vacation in July.    Did not complete the potassium; it upset stomach. Big pills.     Weight frustrating.    OBJECTIVE:                                                    /74 (BP Location: Right arm, Patient Position: Chair, Cuff Size: Adult Large)  Pulse 56  Temp 97.7  F (36.5  C) (Oral)  Ht 5' 9\" (1.753 m)  Wt 236 lb 12.8 oz (107.4 kg)  LMP 09/29/2006  SpO2 96%  BMI 34.97 kg/m2  Body mass index is 34.97 kg/(m^2).  GENERAL APPEARANCE: alert and no distress  CV: regular rates and rhythm  MS: no " edema.  PSYCH: mentation appears normal and affect normal/bright      ACT Total Scores 6/8/2017   ACT TOTAL SCORE (Goal Greater than or Equal to 20) 23   In the past 12 months, how many times did you visit the emergency room for your asthma without being admitted to the hospital? 0   In the past 12 months, how many times were you hospitalized overnight because of your asthma? 0     PHQ-9 SCORE 11/29/2016 4/7/2017 6/8/2017   Total Score - - -   Total Score MyChart - - -   Total Score 7 12 4       BOBBY-7 SCORE 11/29/2016 4/7/2017 6/8/2017   Total Score - - -   Total Score - - -   Total Score 1 4 0          ASSESSMENT/PLAN:                                                      Major depression in complete remission (H)  Doing well. She does believe the medication has helped.  But also she had a vacation. Discussed taking time and taking care of herself as being important as well. Encourage her to continue to do this.     Anxiety state  As above.     Hypokalemia  Will recheck. Anticipate a different formulation if needing. Discussed there are packets.   - Potassium    Mild intermittent asthma without complication  Stable.   - Asthma Action Plan (AAP)    Hypertension goal BP (blood pressure) < 140/90  Controlled.          Aubrie Antoine MD, MD  [facility]

## 2017-06-09 LAB — POTASSIUM SERPL-SCNC: 4 MMOL/L (ref 3.4–5.3)

## 2017-06-09 ASSESSMENT — ASTHMA QUESTIONNAIRES: ACT_TOTALSCORE: 23

## 2017-06-09 ASSESSMENT — ANXIETY QUESTIONNAIRES: GAD7 TOTAL SCORE: 0

## 2017-06-09 ASSESSMENT — PATIENT HEALTH QUESTIONNAIRE - PHQ9: SUM OF ALL RESPONSES TO PHQ QUESTIONS 1-9: 4

## 2017-06-10 ENCOUNTER — HEALTH MAINTENANCE LETTER (OUTPATIENT)
Age: 57
End: 2017-06-10

## 2017-06-13 ENCOUNTER — DOCUMENTATION ONLY (OUTPATIENT)
Dept: FAMILY MEDICINE | Facility: CLINIC | Age: 57
End: 2017-06-13

## 2017-06-13 NOTE — PROGRESS NOTES
Recd records from [facility] 06/13/2017 and forwarded to Beatrice Magallon for review and scanning

## 2017-07-06 ENCOUNTER — TELEPHONE (OUTPATIENT)
Dept: FAMILY MEDICINE | Facility: CLINIC | Age: 57
End: 2017-07-06

## 2017-07-06 NOTE — TELEPHONE ENCOUNTER
7/6/2017      Patient is going on vacation and will call back to schedule when she returns.        Outreach ,  Denise Kirk

## 2017-09-19 ENCOUNTER — MYC REFILL (OUTPATIENT)
Dept: FAMILY MEDICINE | Facility: CLINIC | Age: 57
End: 2017-09-19

## 2017-09-19 DIAGNOSIS — F33.41 RECURRENT MAJOR DEPRESSIVE DISORDER, IN PARTIAL REMISSION (H): ICD-10-CM

## 2017-09-19 DIAGNOSIS — I10 ESSENTIAL HYPERTENSION, BENIGN: ICD-10-CM

## 2017-09-19 DIAGNOSIS — I10 HYPERTENSION GOAL BP (BLOOD PRESSURE) < 140/90: ICD-10-CM

## 2017-09-19 RX ORDER — TRIAMTERENE AND HYDROCHLOROTHIAZIDE 37.5; 25 MG/1; MG/1
CAPSULE ORAL
Qty: 30 CAPSULE | Refills: 3 | Status: SHIPPED | OUTPATIENT
Start: 2017-09-19 | End: 2018-04-12

## 2017-09-19 RX ORDER — FLUOXETINE 40 MG/1
40 CAPSULE ORAL DAILY
Qty: 30 CAPSULE | Refills: 3 | Status: SHIPPED | OUTPATIENT
Start: 2017-09-19 | End: 2018-02-17

## 2017-09-19 RX ORDER — ATENOLOL 50 MG/1
50 TABLET ORAL DAILY
Qty: 30 TABLET | Refills: 3 | Status: SHIPPED | OUTPATIENT
Start: 2017-09-19 | End: 2017-09-22

## 2017-09-19 NOTE — TELEPHONE ENCOUNTER
Message from [employer]:  Original authorizing provider: Aubrie Antoine MD, MD    Felisha Gorman would like a refill of the following medications:  atenolol (TENORMIN) 50 MG tablet [Aubrie Antoine MD, MD]  triamterene-hydrochlorothiazide (DYAZIDE) 37.5-25 MG per capsule [Aubrie Antoine MD, MD]  FLUoxetine (PROZAC) 40 MG capsule [Aubrie Antoine MD, MD]    Preferred pharmacy: [facility] [address] [place] [address] - [address] [place] [address] [address] AT [facility] & [facility] [address]    Comment:

## 2017-09-19 NOTE — TELEPHONE ENCOUNTER
Her last refills were called in due to our computers being down. (5/23/17)  Refills are sent per protocol.  Herlinda Cesar, RN  Triage Nurse

## 2017-09-22 RX ORDER — ATENOLOL 50 MG/1
50 TABLET ORAL DAILY
Qty: 90 TABLET | Refills: 0 | Status: SHIPPED | OUTPATIENT
Start: 2017-09-22 | End: 2019-06-12

## 2017-09-22 NOTE — TELEPHONE ENCOUNTER
Patent called this morning, and her pharmacy has not filled her medications yet.  Called them, and they have 2 refills ready for her, but the Atenolol is not available.  Patient is ok with sending this to a  pharmacy-sent 90 to [facility] pharmacy.  They are open until 6 today. Contacted them and they do have this on hand.   Patient will  later.   Herlinda Cesar, RN  Triage Nurse

## 2017-10-03 ENCOUNTER — OFFICE VISIT (OUTPATIENT)
Dept: FAMILY MEDICINE | Facility: CLINIC | Age: 57
End: 2017-10-03
Payer: COMMERCIAL

## 2017-10-03 VITALS
WEIGHT: 227 LBS | SYSTOLIC BLOOD PRESSURE: 128 MMHG | BODY MASS INDEX: 33.62 KG/M2 | TEMPERATURE: 98 F | DIASTOLIC BLOOD PRESSURE: 70 MMHG | HEIGHT: 69 IN | HEART RATE: 70 BPM

## 2017-10-03 DIAGNOSIS — Z23 NEED FOR PROPHYLACTIC VACCINATION AND INOCULATION AGAINST INFLUENZA: ICD-10-CM

## 2017-10-03 DIAGNOSIS — R82.90 ABNORMAL URINE ODOR: Primary | ICD-10-CM

## 2017-10-03 LAB
ALBUMIN UR-MCNC: NEGATIVE MG/DL
APPEARANCE UR: CLEAR
BACTERIA #/AREA URNS HPF: ABNORMAL /HPF
BILIRUB UR QL STRIP: NEGATIVE
COLOR UR AUTO: YELLOW
GLUCOSE UR STRIP-MCNC: NEGATIVE MG/DL
HGB UR QL STRIP: NEGATIVE
KETONES UR STRIP-MCNC: 15 MG/DL
LEUKOCYTE ESTERASE UR QL STRIP: ABNORMAL
NITRATE UR QL: NEGATIVE
NON-SQ EPI CELLS #/AREA URNS LPF: ABNORMAL /LPF
PH UR STRIP: 5.5 PH (ref 5–7)
RBC #/AREA URNS AUTO: ABNORMAL /HPF
SOURCE: ABNORMAL
SP GR UR STRIP: 1.02 (ref 1–1.03)
UROBILINOGEN UR STRIP-ACNC: 1 EU/DL (ref 0.2–1)
WBC #/AREA URNS AUTO: ABNORMAL /HPF

## 2017-10-03 PROCEDURE — 90471 IMMUNIZATION ADMIN: CPT | Performed by: FAMILY MEDICINE

## 2017-10-03 PROCEDURE — 81001 URINALYSIS AUTO W/SCOPE: CPT | Performed by: FAMILY MEDICINE

## 2017-10-03 PROCEDURE — 99213 OFFICE O/P EST LOW 20 MIN: CPT | Mod: 25 | Performed by: FAMILY MEDICINE

## 2017-10-03 PROCEDURE — 90686 IIV4 VACC NO PRSV 0.5 ML IM: CPT | Performed by: FAMILY MEDICINE

## 2017-10-03 NOTE — MR AVS SNAPSHOT
After Visit Summary   10/3/2017    Felisha Gorman    MRN: [id number]           Patient Information     Date Of Birth          1960        Visit Information        Provider Department      10/3/2017 3:40 PM Adrienne Buchanan MD [facility] [place]        Today's Diagnoses     Abnormal urine odor    -  1    Need for prophylactic vaccination and inoculation against influenza           Follow-ups after your visit        Follow-up notes from your care team     Return in about 5 weeks (around 11/7/2017), or if symptoms worsen or fail to improve.      Who to contact     If you have questions or need follow up information about today's clinic visit or your schedule please contact [facility] [place] directly at [phone].  Normal or non-critical lab and imaging results will be communicated to you by Lecerehart, letter or phone within 4 business days after the clinic has received the results. If you do not hear from us within 7 days, please contact the clinic through Lecerehart or phone. If you have a critical or abnormal lab result, we will notify you by phone as soon as possible.  Submit refill requests through Magnomatics or call your pharmacy and they will forward the refill request to us. Please allow 3 business days for your refill to be completed.          Additional Information About Your Visit        MyChart Information     [employer]ics gives you secure access to your electronic health record. If you see a primary care provider, you can also send messages to your care team and make appointments. If you have questions, please call your primary care clinic.  If you do not have a primary care provider, please call [phone] and they will assist you.        Care EveryWhere ID     This is your Care EveryWhere ID. This could be used by other organizations to access your [facility] medical records  [id number]        Your Vitals Were     Pulse Temperature Height Last Period BMI (Body Mass Index)     "   70 98  F (36.7  C) (Tympanic) 5' 9\" (1.753 m) 09/29/2006 33.52 kg/m2        Blood Pressure from Last 3 Encounters:   10/03/17 128/70   06/08/17 122/74   04/07/17 136/82    Weight from Last 3 Encounters:   10/03/17 227 lb (103 kg)   06/08/17 236 lb 12.8 oz (107.4 kg)   04/07/17 232 lb 4.8 oz (105.4 kg)              We Performed the Following     *UA reflex to Microscopic and Culture ([facility] (except [place] and [place])     FLU VAC, SPLIT VIRUS IM > 3 YO (QUADRIVALENT) [86274]     Urine Microscopic     Vaccine Administration, Initial [22764]          Today's Medication Changes          These changes are accurate as of: 10/3/17 11:59 PM.  If you have any questions, ask your nurse or doctor.               These medicines have changed or have updated prescriptions.        Dose/Directions    fluticasone 50 MCG/ACT spray   Commonly known as:  FLONASE   This may have changed:    - when to take this  - reasons to take this   Used for:  Allergy, unspecified not elsewhere classified        Dose:  1-2 spray   Spray 1-2 sprays into both nostrils daily   Quantity:  3 Package   Refills:  3                Primary Care Provider Office Phone # Fax #    Aubrie Antoine [phone] [address] [facility] [address]        Equal Access to Services     [facility] AH: Hadii luna jeronimo hadasho Soomaali, waaxda luqadaha, qaybta kaalmada adeegyada, osman tanner. So [facility] [phone].    ATENCIÓN: Si habla español, tiene a see disposición servicios gratuitos de asistencia lingüística. Llame al [phone].    We comply with applicable federal civil rights laws and [place] laws. We do not discriminate on the basis of race, color, national origin, age, disability, sex, sexual orientation, or gender identity.            Thank you!     Thank you for choosing [facility] [place]  for your care. Our goal is always to provide you with excellent care. Hearing " back from our patients is one way we can continue to improve our services. Please take a few minutes to complete the written survey that you may receive in the mail after your visit with us. Thank you!             Your Updated Medication List - Protect others around you: Learn how to safely use, store and throw away your medicines at www.disposemymeds.org.          This list is accurate as of: 10/3/17 11:59 PM.  Always use your most recent med list.                   Brand Name Dispense Instructions for use Diagnosis    albuterol 108 (90 BASE) MCG/ACT Inhaler    PROAIR HFA/PROVENTIL HFA/VENTOLIN HFA    1 Inhaler    Inhale 2 puffs into the lungs every 6 hours as needed for shortness of breath / dyspnea or wheezing    Mild intermittent asthma without complication       atenolol 50 MG tablet    TENORMIN    90 tablet    Take 1 tablet (50 mg) by mouth daily    Essential hypertension, benign       FLUoxetine 40 MG capsule    PROzac    30 capsule    Take 1 capsule (40 mg) by mouth daily    Recurrent major depressive disorder, in partial remission (H)       fluticasone 220 MCG/ACT Inhaler    FLOVENT HFA    1 Inhaler    2 puffs bid and rinse and spit after use    Mild intermittent asthma       fluticasone 50 MCG/ACT spray    FLONASE    3 Package    Spray 1-2 sprays into both nostrils daily    Allergy, unspecified not elsewhere classified       metFORMIN 500 MG 24 hr tablet    GLUCOPHAGE-XR    60 tablet    Take 1 tablet (500 mg) by mouth daily (with dinner) If doing well after 2 weeks, may increase to 2 tabs with dinner.    Impaired fasting glucose, Non morbid obesity, unspecified obesity type       potassium chloride SA 20 MEQ CR tablet    KLOR-CON    14 tablet    Take 1 tablet (20 mEq) by mouth 2 times daily    Hypokalemia       triamterene-hydrochlorothiazide 37.5-25 MG per capsule    DYAZIDE    30 capsule    Take one tablet by mouth daily    Hypertension goal BP (blood pressure) < 140/90

## 2017-10-03 NOTE — NURSING NOTE
"Chief Complaint   Patient presents with     UTI       Initial /70  Pulse 70  Temp 98  F (36.7  C) (Tympanic)  Ht 5' 9\" (1.753 m)  Wt 227 lb (103 kg)  LMP 09/29/2006  BMI 33.52 kg/m2 Estimated body mass index is 33.52 kg/(m^2) as calculated from the following:    Height as of this encounter: 5' 9\" (1.753 m).    Weight as of this encounter: 227 lb (103 kg).  Medication Reconciliation: complete     Christie Jay CMA      "

## 2017-10-03 NOTE — PROGRESS NOTES
SUBJECTIVE:   Felisha Gorman is a 57 year old female who presents to clinic today for the following health issues:      URINARY TRACT SYMPTOMS  Onset: x one week    Description:   Painful urination (Dysuria): no   Blood in urine (Hematuria): no   Delay in urine (Hesitency): no     Intensity: mild    Progression of Symptoms:  same    Accompanying Signs & Symptoms:  Fever/chills: no   Flank pain no   Nausea and vomiting: YES- nausea  Any vaginal symptoms: none  Abdominal/Pelvic Pain: no     History:   History of frequent UTI's: no   History of kidney stones: no   Sexually Active: no   Possibility of pregnancy: No    Precipitating factors:       Therapies Tried and outcome: none        Problem list and histories reviewed & adjusted, as indicated.  Additional history: as documented    Patient Active Problem List   Diagnosis     Allergic state     Dysthymic disorder     Mild intermittent asthma     Hypertension goal BP (blood pressure) < 140/90     Obstructive sleep apnea     Anxiety state     IMPAIRED FASTING GLUCOSE     Major depression in complete remission (H)     CARDIOVASCULAR SCREENING; LDL GOAL LESS THAN 160     Family history of ovarian cancer     Knee pain     Osteoarthritis, knee     Abnormal finding on MRI of brain     Radicular low back pain     Recurrent major depressive disorder, in partial remission (H)     Obesity     Non morbid obesity, unspecified obesity type     Past Surgical History:   Procedure Laterality Date     C NONSPECIFIC PROCEDURE  1/02    nasal polyp removed     COLONOSCOPY  7/09    hyperplastic polyp; repeat 10 years     SURGICAL HISTORY OF -   1/06    endometrial ablation     TONSILLECTOMY         Social History   Substance Use Topics     Smoking status: Former Smoker     Packs/day: 2.00     Years: 10.00     Types: Cigarettes     Quit date: 12/24/1986     Smokeless tobacco: Never Used      Comment: many years ago     Alcohol use No     Family History   Problem Relation Age of Onset      "KURT Brother      age 50     HEART DISEASE Brother      KURT Mother      HEART DISEASE Mother      CANCER Mother      ovarian OK now     Cancer - colorectal Mother      cancerous polyp     KURT Father      MI     HEART DISEASE Father          Current Outpatient Prescriptions   Medication Sig Dispense Refill     atenolol (TENORMIN) 50 MG tablet Take 1 tablet (50 mg) by mouth daily 90 tablet 0     FLUoxetine (PROZAC) 40 MG capsule Take 1 capsule (40 mg) by mouth daily 30 capsule 3     potassium chloride SA (POTASSIUM CHLORIDE) 20 MEQ CR tablet Take 1 tablet (20 mEq) by mouth 2 times daily 14 tablet 0     metFORMIN (GLUCOPHAGE-XR) 500 MG 24 hr tablet Take 1 tablet (500 mg) by mouth daily (with dinner) If doing well after 2 weeks, may increase to 2 tabs with dinner. 60 tablet 0     albuterol (PROAIR HFA, PROVENTIL HFA, VENTOLIN HFA) 108 (90 BASE) MCG/ACT inhaler Inhale 2 puffs into the lungs every 6 hours as needed for shortness of breath / dyspnea or wheezing 1 Inhaler 1     fluticasone (FLONASE) 50 MCG/ACT nasal spray Spray 1-2 sprays into both nostrils daily (Patient taking differently: Spray 1-2 sprays into both nostrils as needed ) 3 Package 3     fluticasone (FLOVENT HFA) 220 MCG/ACT inhaler 2 puffs bid and rinse and spit after use 1 Inhaler prn     triamterene-hydrochlorothiazide (DYAZIDE) 37.5-25 MG per capsule Take one tablet by mouth daily 30 capsule 3     Allergies   Allergen Reactions     Penicillin [Esters] Rash         Reviewed and updated as needed this visit by clinical staff     Reviewed and updated as needed this visit by Provider         ROS:  C: NEGATIVE for fever, chills, change in weight  E/M: NEGATIVE for ear, mouth and throat problems  R: NEGATIVE for significant cough or SOB  CV: NEGATIVE for chest pain, palpitations or peripheral edema    OBJECTIVE:                                                    /70  Pulse 70  Temp 98  F (36.7  C) (Tympanic)  Ht 5' 9\" (1.753 m)  Wt 227 lb " (103 kg)  LMP 09/29/2006  BMI 33.52 kg/m2  Body mass index is 33.52 kg/(m^2).   GENERAL: healthy, alert, well nourished, well hydrated, no distress  RESP: lungs clear to auscultation - no rales, no rhonchi, no wheezes  CV: regular rates and rhythm, normal S1 S2, no S3 or S4 and no murmur, no click or rub -  ABDOMEN: soft, no tenderness, no  hepatosplenomegaly, no masses, normal bowel sounds  BACK: no CVA tenderness, no paralumbar tenderness    Results for orders placed or performed in visit on 10/03/17   *UA reflex to Microscopic and Culture ([facility] (except [place] and [place])   Result Value Ref Range    Color Urine Yellow     Appearance Urine Clear     Glucose Urine Negative NEG^Negative mg/dL    Bilirubin Urine Negative NEG^Negative    Ketones Urine 15 (A) NEG^Negative mg/dL    Specific Gravity Urine 1.020 1.003 - 1.035    Blood Urine Negative NEG^Negative    pH Urine 5.5 5.0 - 7.0 pH    Protein Albumin Urine Negative NEG^Negative mg/dL    Urobilinogen Urine 1.0 0.2 - 1.0 EU/dL    Nitrite Urine Negative NEG^Negative    Leukocyte Esterase Urine Small (A) NEG^Negative    Source Midstream Urine    Urine Microscopic   Result Value Ref Range    WBC Urine O - 2 OTO2^O - 2 /HPF    RBC Urine O - 2 OTO2^O - 2 /HPF    Squamous Epithelial /LPF Urine Few FEW^Few /LPF    Bacteria Urine Few (A) NEG^Negative /HPF          ASSESSMENT/PLAN:                                                      1. Abnormal urine odor  No signs of UTI. No hematuria noted. Recommended to increase hydration as there are ketones in the urine.   - *UA reflex to Microscopic and Culture ([facility] (except [place] and [place])  - Urine Microscopic    2. Need for prophylactic vaccination and inoculation against influenza    - FLU VAC, SPLIT VIRUS IM > 3 YO (QUADRIVALENT) [40887]  - Vaccine Administration, Initial [21090]      Follow up with Provider - if symptoms are not improving in a few days.      Adrienne  MD Dewayne  [facility]  Injectable Influenza Immunization Documentation    1.  Is the person to be vaccinated sick today?   No    2. Does the person to be vaccinated have an allergy to a component   of the vaccine?   No    3. Has the person to be vaccinated ever had a serious reaction   to influenza vaccine in the past?   No    4. Has the person to be vaccinated ever had Guillain-Barré syndrome?   No    Form completed by Christie Jay CMA

## 2017-10-13 ENCOUNTER — TELEPHONE (OUTPATIENT)
Dept: FAMILY MEDICINE | Facility: CLINIC | Age: 57
End: 2017-10-13

## 2017-10-13 DIAGNOSIS — I10 ESSENTIAL HYPERTENSION, BENIGN: ICD-10-CM

## 2017-10-13 RX ORDER — ATENOLOL 50 MG/1
50 TABLET ORAL DAILY
Qty: 90 TABLET | Refills: 0 | OUTPATIENT
Start: 2017-10-13

## 2017-10-13 NOTE — TELEPHONE ENCOUNTER
Pharmacy notes: atenolol shortage, please send alternative.    atenolol (TENORMIN) 50 MG      Last Written Prescription Date: 9/22/17  Last Fill Quantity: 90, # refills: 0    Last Office Visit with [facility], P or [facility] prescribing provider:  10/3/17   Future Office Visit:        BP Readings from Last 3 Encounters:   10/03/17 128/70   06/08/17 122/74   04/07/17 136/82

## 2017-10-13 NOTE — TELEPHONE ENCOUNTER
This was filled at  pharmacy 9/22/17.  Called them to be sure she did pick it up.  She did pick it up, sent back to pharmacy as already filled.  No alternative medication.    Herlinda Cesar, CARLOS MANUEL  Triage Nurse

## 2017-10-19 ENCOUNTER — HOSPITAL ENCOUNTER (OUTPATIENT)
Dept: MAMMOGRAPHY | Facility: CLINIC | Age: 57
Discharge: HOME | End: 2017-10-19
Attending: FAMILY MEDICINE | Admitting: FAMILY MEDICINE
Payer: COMMERCIAL

## 2017-10-19 DIAGNOSIS — Z12.31 VISIT FOR SCREENING MAMMOGRAM: ICD-10-CM

## 2017-10-19 PROCEDURE — G0202 SCR MAMMO BI INCL CAD: HCPCS

## 2017-10-24 ENCOUNTER — TELEPHONE (OUTPATIENT)
Dept: FAMILY MEDICINE | Facility: CLINIC | Age: 57
End: 2017-10-24

## 2017-10-24 NOTE — TELEPHONE ENCOUNTER
Left message for pt to fill out mychart questionnaires for the phq9 and Freedom and send back so provider can review the scores to see how effective the medication is.  Message has been sent.  Waiting for reply.  Flori Cole CMA

## 2017-11-10 ENCOUNTER — TELEPHONE (OUTPATIENT)
Dept: FAMILY MEDICINE | Facility: CLINIC | Age: 57
End: 2017-11-10

## 2017-11-10 DIAGNOSIS — G47.33 OBSTRUCTIVE SLEEP APNEA: ICD-10-CM

## 2017-11-10 NOTE — TELEPHONE ENCOUNTER
Patient is having breast reduction surgery in December and has pre op scheduled for 12/1. Requesting sleep apnea dx be removed from chart. Patient stated had sleep study done and it was determined to be mild apnea and she has also lost weight. Does not use a CPAP.    Wants dx removed as would have to be scheduled someplace else for surgery.    Advised may not be able to remove dx unless has another sleep study done. Patient agreeable to have another sleep study done, but advised would probably not get done before surgery date.    Please advise.    Madison Earl RN

## 2017-11-10 NOTE — TELEPHONE ENCOUNTER
I have put a notation on the problem list (that I already had in her PMH) regarding that she does not use CPAP and feels rested.  I don't feel like I can just remove the diagnosis without another sleep evaluation; would leave a study up to the sleep provider.    Can refer if she would like.     Reviewed her weight curve. She had lost and then regained some...

## 2017-11-10 NOTE — TELEPHONE ENCOUNTER
Spoke with patient. Decided to schedule at different place for surgery even though inconvenient. A sleep study would not guarantee different dx.    Madison Earl RN

## 2017-12-01 ENCOUNTER — OFFICE VISIT (OUTPATIENT)
Dept: FAMILY MEDICINE | Facility: CLINIC | Age: 57
End: 2017-12-01
Payer: COMMERCIAL

## 2017-12-01 VITALS
OXYGEN SATURATION: 96 % | SYSTOLIC BLOOD PRESSURE: 118 MMHG | HEART RATE: 60 BPM | TEMPERATURE: 98 F | WEIGHT: 218.2 LBS | BODY MASS INDEX: 32.22 KG/M2 | DIASTOLIC BLOOD PRESSURE: 70 MMHG

## 2017-12-01 DIAGNOSIS — N62 LARGE BREASTS: ICD-10-CM

## 2017-12-01 DIAGNOSIS — E87.6 HYPOKALEMIA: ICD-10-CM

## 2017-12-01 DIAGNOSIS — E66.9 NON MORBID OBESITY, UNSPECIFIED OBESITY TYPE: ICD-10-CM

## 2017-12-01 DIAGNOSIS — Z01.818 PREOP GENERAL PHYSICAL EXAM: Primary | ICD-10-CM

## 2017-12-01 LAB
ERYTHROCYTE [DISTWIDTH] IN BLOOD BY AUTOMATED COUNT: 13.1 % (ref 10–15)
HCT VFR BLD AUTO: 44 % (ref 35–47)
HGB BLD-MCNC: 14.7 G/DL (ref 11.7–15.7)
MCH RBC QN AUTO: 29.1 PG (ref 26.5–33)
MCHC RBC AUTO-ENTMCNC: 33.4 G/DL (ref 31.5–36.5)
MCV RBC AUTO: 87 FL (ref 78–100)
PLATELET # BLD AUTO: 411 10E9/L (ref 150–450)
RBC # BLD AUTO: 5.06 10E12/L (ref 3.8–5.2)
WBC # BLD AUTO: 10.5 10E9/L (ref 4–11)

## 2017-12-01 PROCEDURE — 85027 COMPLETE CBC AUTOMATED: CPT | Performed by: FAMILY MEDICINE

## 2017-12-01 PROCEDURE — 36415 COLL VENOUS BLD VENIPUNCTURE: CPT | Performed by: FAMILY MEDICINE

## 2017-12-01 PROCEDURE — 99214 OFFICE O/P EST MOD 30 MIN: CPT | Performed by: FAMILY MEDICINE

## 2017-12-01 PROCEDURE — 80048 BASIC METABOLIC PNL TOTAL CA: CPT | Performed by: FAMILY MEDICINE

## 2017-12-01 NOTE — NURSING NOTE
"Chief Complaint   Patient presents with     Pre-Op Exam       Initial /70  Pulse 60  Temp 98  F (36.7  C) (Oral)  Wt 218 lb 3.2 oz (99 kg)  LMP 09/29/2006  SpO2 96%  BMI 32.22 kg/m2 Estimated body mass index is 32.22 kg/(m^2) as calculated from the following:    Height as of 10/3/17: 5' 9\" (1.753 m).    Weight as of this encounter: 218 lb 3.2 oz (99 kg).  Medication Reconciliation: complete   Kyra STODDARD M.A.      "

## 2017-12-01 NOTE — PATIENT INSTRUCTIONS
Before Your Surgery      Call your surgeon if there is any change in your health. This includes signs of a cold or flu (such as a sore throat, runny nose, cough, rash or fever).    Do not smoke, drink alcohol or take over the counter medicine (unless your surgeon or primary care doctor tells you to) for the 24 hours before and after surgery.    If you take prescribed drugs: Follow your doctor s orders about which medicines to take and which to stop until after surgery.    Eating and drinking prior to surgery: follow the instructions from your surgeon    Take a shower or bath the night before surgery. Use the soap your surgeon gave you to gently clean your skin. If you do not have soap from your surgeon, use your regular soap. Do not shave or scrub the surgery site.  Wear clean pajamas and have clean sheets on your bed.       ----------------------------------------------------------------    Ok to take all your medications on the evening before surgery.  Do keep up on the atenolol the night of surgery. You can take your other medications if feeling OK.

## 2017-12-01 NOTE — PROGRESS NOTES
[facility]  [address] [address] [id number] [phone]  Dept: [phone]    PRE-OP EVALUATION:  Today's date: 2017    Felisha Gorman (: 1960) presents for pre-operative evaluation assessment as requested by Dr.Michael Cespedes.  She requires evaluation and anesthesia risk assessment prior to undergoing surgery/procedure for treatment of Breast reduction and tummy tuck .  Proposed procedure:  Breast reduction and tummy tuck    Date of Surgery/ Procedure: 17  Time of Surgery/ Procedure: [facility] Facility: [facility]   Fax number for surgical facility: [phone]  Primary Physician: Aubrie Antoine  Type of Anesthesia Anticipated: General    Patient has a Health Care Directive or Living Will:  NO    Preop Questions 2017   1.  Do you have a history of heart attack, stroke, stent, bypass or surgery on an artery in the head, neck, heart or legs? No   2.  Do you ever have any pain or discomfort in your chest? No   3.  Do you have a history of  Heart Failure? No   4.   Are you troubled by shortness of breath when:  walking on a level surface, or up a slight hill, or at night? No   5.  Do you currently have a cold, bronchitis or other respiratory infection? No   6.  Do you have a cough, shortness of breath, or wheezing? No   7.  Do you sometimes get pains in the calves of your legs when you walk? No   8. Do you or anyone in your family have previous history of blood clots? No   9.  Do you or does anyone in your family have a serious bleeding problem such as prolonged bleeding following surgeries or cuts? No   10. Have you ever had problems with anemia or been told to take iron pills? No   11. Have you had any abnormal blood loss such as black, tarry or bloody stools, or abnormal vaginal bleeding? No   12. Have you ever had a blood transfusion? No   13. Have you or any of your relatives ever had problems with anesthesia? No   14. Do  you have sleep apnea, excessive snoring or daytime drowsiness? No   15. Do you have any prosthetic heart valves? No   16. Do you have prosthetic joints? No   17. Is there any chance that you may be pregnant? No           HPI:                                                      Brief HPI related to upcoming procedure: symptomatic large breasts and desire for tummy tuck.        See problem list for active medical problems.  Problems all longstanding and stable, except as noted/documented.  See ROS for pertinent symptoms related to these conditions.                                                                                                  .    MEDICAL HISTORY:                                                    Patient Active Problem List    Diagnosis Date Noted     Non morbid obesity, unspecified obesity type 04/11/2017     Priority: Medium     Recurrent major depressive disorder, in partial remission (H) 11/29/2016     Priority: Medium     Radicular low back pain 08/20/2014     Priority: Medium     Abnormal finding on MRI of brain 03/25/2014     Priority: Medium     MRI 2009:  IMPRESSION: Slight decrease in the size of the right temporal lobe  lesion when compared to the previous study. This is probably due to  gliosis secondary to either an infectious or inflammatory process or  possibly secondary to previous trauma.       Knee pain 07/02/2012     Priority: Medium     Osteoarthritis, knee 07/02/2012     Priority: Medium     Family history of ovarian cancer 04/20/2012     Priority: Medium     Mother       CARDIOVASCULAR SCREENING; LDL GOAL LESS THAN 160 08/20/2011     Priority: Medium     [place] 10-year CHD Risk Score: 1% (12 Total Points)   Values used to calculate score:     Age: 51 years -- Points: 6     Total Cholesterol: 223 mg/dL -- Points: 4     HDL Cholesterol: 35 mg/dL -- Points: 2     Systolic BP (untreated): 110 mmHg -- Points: 0    The patient is not a smoker. -- Points: 0    The patient has not  been diagnosed with diabetes. -- Points: 0    The patient does not have a family history of CHD. -- Points:0          Major depression in complete remission (H) 05/10/2011     Priority: Medium     IMPAIRED FASTING GLUCOSE 09/23/2007     Priority: Medium     Glucose 101 9/07       Anxiety state      Priority: Medium     Problem list name updated by automated process. Provider to review       Obstructive sleep apnea 01/08/2006     Priority: Medium     Mild. She does not use CPAP. Reports feeling rested 2014  Notes has lost weight       Hypertension goal BP (blood pressure) < 140/90 06/14/2005     Priority: Medium     Allergic state 05/06/2005     Priority: Medium     Problem list name updated by automated process. Provider to review       Dysthymic disorder 05/06/2005     Priority: Medium     Mild intermittent asthma 05/06/2005     Priority: Medium     URI induced        Past Medical History:   Diagnosis Date     Allergy, unspecified not elsewhere classified      Dysthymic disorder      Essential hypertension, benign      flight anxiety      Frequent BRONCHITIS     usually in winter     Impaired fasting glucose 9/23/2007    Glucose 101 9/07     Mild intermittent asthma     URI induced     Obstructive sleep apnea (adult) (pediatric)     not on CPAP; feels rested 2014     Other and unspecified hyperlipidemia      Rheumatic fever without mention of heart involvement     no sequelae     transient neurologic sx 2005    ? TIA; had MRI with temporal lobe lesion that is getting smaller     Past Surgical History:   Procedure Laterality Date     C NONSPECIFIC PROCEDURE  1/02    nasal polyp removed     COLONOSCOPY  7/09    hyperplastic polyp; repeat 10 years     SURGICAL HISTORY OF -   1/06    endometrial ablation     TONSILLECTOMY       Current Outpatient Prescriptions   Medication Sig Dispense Refill     atenolol (TENORMIN) 50 MG tablet Take 1 tablet (50 mg) by mouth daily 90 tablet 0     triamterene-hydrochlorothiazide  (DYAZIDE) 37.5-25 MG per capsule Take one tablet by mouth daily 30 capsule 3     FLUoxetine (PROZAC) 40 MG capsule Take 1 capsule (40 mg) by mouth daily 30 capsule 3             metFORMIN (GLUCOPHAGE-XR) 500 MG 24 hr tablet Take 1 tablet (500 mg) by mouth daily (with dinner) If doing well after 2 weeks, may increase to 2 tabs with dinner. 60 tablet 0     albuterol (PROAIR HFA, PROVENTIL HFA, VENTOLIN HFA) 108 (90 BASE) MCG/ACT inhaler Inhale 2 puffs into the lungs every 6 hours as needed for shortness of breath / dyspnea or wheezing 1 Inhaler 1     fluticasone (FLONASE) 50 MCG/ACT nasal spray Spray 1-2 sprays into both nostrils daily (Patient taking differently: Spray 1-2 sprays into both nostrils as needed ) 3 Package 3     fluticasone (FLOVENT HFA) 220 MCG/ACT inhaler 2 puffs bid and rinse and spit after use 1 Inhaler prn     OTC products: Fiberwellfit with B. (gummy bear with fiber and some B vitamins)    Allergies   Allergen Reactions     Penicillin [Esters] Rash      Latex Allergy: NO    Social History   Substance Use Topics     Smoking status: Former Smoker     Packs/day: 2.00     Years: 10.00     Types: Cigarettes     Quit date: 12/24/1986     Smokeless tobacco: Never Used      Comment: many years ago     Alcohol use No     History   Drug Use No       REVIEW OF SYSTEMS:                                                    C: NEGATIVE for fever, chills, change in weight; x weight loss with healthier diet.   E/M: NEGATIVE for ear, mouth and throat problems  R: NEGATIVE for significant cough or SOB  CV: NEGATIVE for chest pain, palpitations or peripheral edema  No nausea, vomitting or change in bowel habits.  No urinary symptoms. Occasional incontinence;  Not new.      Does do better when thinner. More energy. Not tired during the day. Has been working at weight loss.     EXAM:                                                    /70  Pulse 60  Temp 98  F (36.7  C) (Oral)  Wt 218 lb 3.2 oz (99 kg)  LMP  09/29/2006  SpO2 96%  BMI 32.22 kg/m2  GENERAL APPEARANCE: alert and no distress  HENT: ear canals and TM's normal and nose and mouth without ulcers or lesions  RESP: lungs clear to auscultation - no rales, rhonchi or wheezes  CV: regular rates and rhythm  ABDOMEN: soft, nontender, no HSM or masses and bowel sounds normal  MS: no ankle edema  NEURO: Normal strength and tone, sensory exam grossly normal, mentation intact and speech normal  PSYCH: mentation appears normal and affect normal/bright    Lab Results   Component Value Date    A1C 5.9 04/07/2017    A1C 6.5 12/01/2015    A1C 6.0 03/25/2014    A1C 6.0 08/19/2011    A1C 5.5 05/24/2008         DIAGNOSTICS:                                                      EKG: Not indicated due to non-vascular surgery and low risk of event (age <65 and without cardiac risk factors)  Labs Resulted Today:   Results for orders placed or performed in visit on 12/01/17   CBC with platelets   Result Value Ref Range    WBC 10.5 4.0 - 11.0 10e9/L    RBC Count 5.06 3.8 - 5.2 10e12/L    Hemoglobin 14.7 11.7 - 15.7 g/dL    Hematocrit 44.0 35.0 - 47.0 %    MCV 87 78 - 100 fl    MCH 29.1 26.5 - 33.0 pg    MCHC 33.4 31.5 - 36.5 g/dL    RDW 13.1 10.0 - 15.0 %    Platelet Count 411 150 - 450 10e9/L   Basic metabolic panel   Result Value Ref Range    Sodium 138 133 - 144 mmol/L    Potassium 3.1 (L) 3.4 - 5.3 mmol/L    Chloride 100 94 - 109 mmol/L    Carbon Dioxide 26 20 - 32 mmol/L    Anion Gap 12 3 - 14 mmol/L    Glucose 118 (H) 70 - 99 mg/dL    Urea Nitrogen 23 7 - 30 mg/dL    Creatinine 0.85 0.52 - 1.04 mg/dL    GFR Estimate 68 >60 mL/min/1.7m2    GFR Estimate If Black 83 >60 mL/min/1.7m2    Calcium 9.6 8.5 - 10.1 mg/dL       Recent Labs   Lab Test  06/08/17   1702  04/07/17   1704  12/01/15   1054   HGB   --    --   14.8   PLT   --    --   448   NA   --   138  138   POTASSIUM  4.0  3.0*  4.0   CR   --   0.76  0.71   A1C   --   5.9  6.5*        IMPRESSION:                                                     Reason for surgery/procedure: large breasts and desire for tummy tuck    The proposed surgical procedure is considered INTERMEDIATE risk.    REVISED CARDIAC RISK INDEX  The patient has the following serious cardiovascular risks for perioperative complications such as (MI, PE, VFib and 3  AV Block):  No serious cardiac risks  INTERPRETATION: 0 risks: Class I (very low risk - 0.4% complication rate)    The patient has the following additional risks for perioperative complications:  No identified additional risks    Preop general physical exam    - CBC with platelets  - Basic metabolic panel    Large breasts  Anticipating surgery  - CBC with platelets  - Basic metabolic panel    Non morbid obesity, unspecified obesity type  Anticipating surgery; tummy tuck.      RECOMMENDATIONS:                                                          Regarding medication:  Ok to take all your medications on the evening before surgery.  Do keep up on the atenolol the night of surgery. You can take your other medications if feeling OK.      APPROVAL GIVEN to proceed with proposed procedure once potassium gets repleted.     Anticipate sending future lab.       Signed Electronically by: Aubrie Antoine MD, MD    Copy of this evaluation report is provided to requesting physician.    [facility] Preop Guidelines

## 2017-12-01 NOTE — MR AVS SNAPSHOT
After Visit Summary   12/1/2017    Felisha Gorman    MRN: [id number]           Patient Information     Date Of Birth          1960        Visit Information        Provider Department      12/1/2017 1:50 PM Aubrie Antoine MD [facility]        Today's Diagnoses     Preop general physical exam    -  1    Large breasts          Care Instructions      Before Your Surgery      Call your surgeon if there is any change in your health. This includes signs of a cold or flu (such as a sore throat, runny nose, cough, rash or fever).    Do not smoke, drink alcohol or take over the counter medicine (unless your surgeon or primary care doctor tells you to) for the 24 hours before and after surgery.    If you take prescribed drugs: Follow your doctor s orders about which medicines to take and which to stop until after surgery.    Eating and drinking prior to surgery: follow the instructions from your surgeon    Take a shower or bath the night before surgery. Use the soap your surgeon gave you to gently clean your skin. If you do not have soap from your surgeon, use your regular soap. Do not shave or scrub the surgery site.  Wear clean pajamas and have clean sheets on your bed.       ----------------------------------------------------------------    Ok to take all your medications on the evening before surgery.  Do keep up on the atenolol the night of surgery. You can take your other medications if feeling OK.          Follow-ups after your visit        Who to contact     If you have questions or need follow up information about today's clinic visit or your schedule please contact [facility] directly at [phone].  Normal or non-critical lab and imaging results will be communicated to you by MyChart, letter or phone within 4 business days after the clinic has received the results. If you do not hear from us within 7 days, please contact the clinic through MyChart or phone. If  you have a critical or abnormal lab result, we will notify you by phone as soon as possible.  Submit refill requests through [employer] or call your pharmacy and they will forward the refill request to us. Please allow 3 business days for your refill to be completed.          Additional Information About Your Visit        [facility] Information     [employer] gives you secure access to your electronic health record. If you see a primary care provider, you can also send messages to your care team and make appointments. If you have questions, please call your primary care clinic.  If you do not have a primary care provider, please call [phone] and they will assist you.        Care EveryWhere ID     This is your Care EveryWhere ID. This could be used by other organizations to access your [facility] medical records  [id number]        Your Vitals Were     Pulse Temperature Last Period Pulse Oximetry BMI (Body Mass Index)       60 98  F (36.7  C) (Oral) 09/29/2006 96% 32.22 kg/m2        Blood Pressure from Last 3 Encounters:   12/01/17 118/70   10/03/17 128/70   06/08/17 122/74    Weight from Last 3 Encounters:   12/01/17 218 lb 3.2 oz (99 kg)   10/03/17 227 lb (103 kg)   06/08/17 236 lb 12.8 oz (107.4 kg)              We Performed the Following     Basic metabolic panel     CBC with platelets          Today's Medication Changes          These changes are accurate as of: 12/1/17  2:23 PM.  If you have any questions, ask your nurse or doctor.               These medicines have changed or have updated prescriptions.        Dose/Directions    fluticasone 50 MCG/ACT spray   Commonly known as:  FLONASE   This may have changed:    - when to take this  - reasons to take this   Used for:  Allergy, unspecified not elsewhere classified        Dose:  1-2 spray   Spray 1-2 sprays into both nostrils daily   Quantity:  3 Package   Refills:  3                Primary Care Provider Office Phone # Fax #    Aubrie Antoine [phone]  [phone]       [address]  [facility] [address]        Equal Access to Services     ANALIMORIS CL[place] : Hadii aad ku hadcarlo Soveronicaali, waaxda luqadaha, qaybta kaalmada adeegjeremyda, osman lucitain hayaagretta rodriguezmitra luz nathaniel tanner. So [facility] [phone].    ATENCIÓN: Si habla español, tiene a see disposición servicios gratuitos de asistencia lingüística. Llame al [phone].    We comply with applicable federal civil rights laws and [place] laws. We do not discriminate on the basis of race, color, national origin, age, disability, sex, sexual orientation, or gender identity.            Thank you!     Thank you for choosing [facility]  for your care. Our goal is always to provide you with excellent care. Hearing back from our patients is one way we can continue to improve our services. Please take a few minutes to complete the written survey that you may receive in the mail after your visit with us. Thank you!             Your Updated Medication List - Protect others around you: Learn how to safely use, store and throw away your medicines at www.disposemymeds.org.          This list is accurate as of: 12/1/17  2:23 PM.  Always use your most recent med list.                   Brand Name Dispense Instructions for use Diagnosis    albuterol 108 (90 BASE) MCG/ACT Inhaler    PROAIR HFA/PROVENTIL HFA/VENTOLIN HFA    1 Inhaler    Inhale 2 puffs into the lungs every 6 hours as needed for shortness of breath / dyspnea or wheezing    Mild intermittent asthma without complication       atenolol 50 MG tablet    TENORMIN    90 tablet    Take 1 tablet (50 mg) by mouth daily    Essential hypertension, benign       FLUoxetine 40 MG capsule    PROzac    30 capsule    Take 1 capsule (40 mg) by mouth daily    Recurrent major depressive disorder, in partial remission (H)       fluticasone 220 MCG/ACT Inhaler    FLOVENT HFA    1 Inhaler    2 puffs bid and rinse and spit after use    Mild intermittent asthma       fluticasone  50 MCG/ACT spray    FLONASE    3 Package    Spray 1-2 sprays into both nostrils daily    Allergy, unspecified not elsewhere classified       metFORMIN 500 MG 24 hr tablet    GLUCOPHAGE-XR    60 tablet    Take 1 tablet (500 mg) by mouth daily (with dinner) If doing well after 2 weeks, may increase to 2 tabs with dinner.    Impaired fasting glucose, Non morbid obesity, unspecified obesity type       potassium chloride SA 20 MEQ CR tablet    KLOR-CON    14 tablet    Take 1 tablet (20 mEq) by mouth 2 times daily    Hypokalemia       triamterene-hydrochlorothiazide 37.5-25 MG per capsule    DYAZIDE    30 capsule    Take one tablet by mouth daily    Hypertension goal BP (blood pressure) < 140/90

## 2017-12-02 LAB
ANION GAP SERPL CALCULATED.3IONS-SCNC: 12 MMOL/L (ref 3–14)
BUN SERPL-MCNC: 23 MG/DL (ref 7–30)
CALCIUM SERPL-MCNC: 9.6 MG/DL (ref 8.5–10.1)
CHLORIDE SERPL-SCNC: 100 MMOL/L (ref 94–109)
CO2 SERPL-SCNC: 26 MMOL/L (ref 20–32)
CREAT SERPL-MCNC: 0.85 MG/DL (ref 0.52–1.04)
GFR SERPL CREATININE-BSD FRML MDRD: 68 ML/MIN/1.7M2
GLUCOSE SERPL-MCNC: 118 MG/DL (ref 70–99)
POTASSIUM SERPL-SCNC: 3.1 MMOL/L (ref 3.4–5.3)
SODIUM SERPL-SCNC: 138 MMOL/L (ref 133–144)

## 2017-12-03 ENCOUNTER — TELEPHONE (OUTPATIENT)
Dept: FAMILY MEDICINE | Facility: CLINIC | Age: 57
End: 2017-12-03

## 2017-12-03 RX ORDER — POTASSIUM CHLORIDE 1500 MG/1
20 TABLET, EXTENDED RELEASE ORAL DAILY
Qty: 90 TABLET | Refills: 1 | Status: SHIPPED | OUTPATIENT
Start: 2017-12-03 | End: 2019-06-12

## 2017-12-03 NOTE — TELEPHONE ENCOUNTER
Please contact her regarding low potassium.    I did send a [employer] message.     I recommended to take potassium three times per day x 2 days; then once daily.    She should recheck her potassium end of the week... We want to make sure her potassium is OK prior to surgery.      Thanks!!!

## 2017-12-04 NOTE — TELEPHONE ENCOUNTER
Called and left a message for the pt to call back.  She has not read her mychart yet and it looks like she does not always read her mycharts.

## 2017-12-14 NOTE — TELEPHONE ENCOUNTER
Confirmed with pharmacy that potassium was picked up on 12/10/2017.      Patient will complete repeat potassium at [facility] sometime this week.    Krista RICHARDS RN, BSN, PHN  [place] [facility] RN

## 2017-12-14 NOTE — TELEPHONE ENCOUNTER
Low potassium lab note read by patient.      Read by Felisha Gorman at 12/4/2017 12:16 PM   Shawn Baeza!     Your potassium is low.     I am recommending that you take potassium three times per day for 2 days, then once daily. You have been low in the past, so I am thinking a daily medication would be good.     I have sent a prescription.     I can then send an addendum regarding the pre op.     I hope you have a smooth recovery after surgery!     Aubrie Antoine MD

## 2017-12-15 ENCOUNTER — TELEPHONE (OUTPATIENT)
Dept: FAMILY MEDICINE | Facility: CLINIC | Age: 57
End: 2017-12-15

## 2017-12-15 DIAGNOSIS — E87.6 HYPOKALEMIA: ICD-10-CM

## 2017-12-15 PROCEDURE — 84132 ASSAY OF SERUM POTASSIUM: CPT | Performed by: FAMILY MEDICINE

## 2017-12-15 PROCEDURE — 36415 COLL VENOUS BLD VENIPUNCTURE: CPT | Performed by: FAMILY MEDICINE

## 2017-12-15 NOTE — TELEPHONE ENCOUNTER
Patient had K lab drawn today, and was asking for you to watch for  The results today, she was told it would be back near the end of the day today.  Still in process at this time. She just wants to be sure her surgery will still  Be going forward on Monday morning. Was wondering if it is low, will someone contact her  To take additional supplements?    Herlinda Cesar, RN  Triage Nurse

## 2017-12-15 NOTE — TELEPHONE ENCOUNTER
Please let her know it is not back yet.    I can watch over the weekend.  She might need to print from [facility] and bring it...

## 2017-12-16 LAB — POTASSIUM SERPL-SCNC: 3.8 MMOL/L (ref 3.4–5.3)

## 2018-01-04 ENCOUNTER — OFFICE VISIT (OUTPATIENT)
Dept: FAMILY MEDICINE | Facility: CLINIC | Age: 58
End: 2018-01-04
Payer: COMMERCIAL

## 2018-01-04 VITALS
OXYGEN SATURATION: 98 % | RESPIRATION RATE: 16 BRPM | HEIGHT: 69 IN | WEIGHT: 212.5 LBS | DIASTOLIC BLOOD PRESSURE: 78 MMHG | BODY MASS INDEX: 31.47 KG/M2 | HEART RATE: 65 BPM | SYSTOLIC BLOOD PRESSURE: 130 MMHG | TEMPERATURE: 97.8 F

## 2018-01-04 DIAGNOSIS — I10 HYPERTENSION GOAL BP (BLOOD PRESSURE) < 140/90: ICD-10-CM

## 2018-01-04 DIAGNOSIS — D64.9 ANEMIA, UNSPECIFIED TYPE: ICD-10-CM

## 2018-01-04 DIAGNOSIS — R53.83 FATIGUE, UNSPECIFIED TYPE: Primary | ICD-10-CM

## 2018-01-04 LAB
ERYTHROCYTE [DISTWIDTH] IN BLOOD BY AUTOMATED COUNT: 14.5 % (ref 10–15)
HCT VFR BLD AUTO: 34.1 % (ref 35–47)
HGB BLD-MCNC: 10.7 G/DL (ref 11.7–15.7)
MCH RBC QN AUTO: 28.6 PG (ref 26.5–33)
MCHC RBC AUTO-ENTMCNC: 31.4 G/DL (ref 31.5–36.5)
MCV RBC AUTO: 91 FL (ref 78–100)
PLATELET # BLD AUTO: 643 10E9/L (ref 150–450)
RBC # BLD AUTO: 3.74 10E12/L (ref 3.8–5.2)
WBC # BLD AUTO: 9.7 10E9/L (ref 4–11)

## 2018-01-04 PROCEDURE — 99214 OFFICE O/P EST MOD 30 MIN: CPT | Performed by: FAMILY MEDICINE

## 2018-01-04 PROCEDURE — 85027 COMPLETE CBC AUTOMATED: CPT | Performed by: FAMILY MEDICINE

## 2018-01-04 PROCEDURE — 36415 COLL VENOUS BLD VENIPUNCTURE: CPT | Performed by: FAMILY MEDICINE

## 2018-01-04 PROCEDURE — 80053 COMPREHEN METABOLIC PANEL: CPT | Performed by: FAMILY MEDICINE

## 2018-01-04 ASSESSMENT — ANXIETY QUESTIONNAIRES
6. BECOMING EASILY ANNOYED OR IRRITABLE: NOT AT ALL
5. BEING SO RESTLESS THAT IT IS HARD TO SIT STILL: NOT AT ALL
3. WORRYING TOO MUCH ABOUT DIFFERENT THINGS: NOT AT ALL
2. NOT BEING ABLE TO STOP OR CONTROL WORRYING: NOT AT ALL
1. FEELING NERVOUS, ANXIOUS, OR ON EDGE: NOT AT ALL
GAD7 TOTAL SCORE: 0
7. FEELING AFRAID AS IF SOMETHING AWFUL MIGHT HAPPEN: NOT AT ALL

## 2018-01-04 ASSESSMENT — PATIENT HEALTH QUESTIONNAIRE - PHQ9
5. POOR APPETITE OR OVEREATING: NOT AT ALL
SUM OF ALL RESPONSES TO PHQ QUESTIONS 1-9: 6

## 2018-01-04 NOTE — PROGRESS NOTES
SUBJECTIVE:   Felisha Gorman is a 57 year old female who presents to clinic today for the following health issues:      Here to follow up with blood pressure medication.  Had been taken off the Triamterene-Hydrochlorothiazide during the recovery of surgery.   Feeling fatigued and a little nausea and chilled.  Is this normal after surgery x 2 weeks.        Problem list and histories reviewed & adjusted, as indicated.  Additional history:     See under ROS     Patient Active Problem List   Diagnosis     Allergic state     Dysthymic disorder     Mild intermittent asthma     Hypertension goal BP (blood pressure) < 140/90     Obstructive sleep apnea     Anxiety state     IMPAIRED FASTING GLUCOSE     Major depression in complete remission (H)     CARDIOVASCULAR SCREENING; LDL GOAL LESS THAN 160     Family history of ovarian cancer     Knee pain     Osteoarthritis, knee     Abnormal finding on MRI of brain     Radicular low back pain     Recurrent major depressive disorder, in partial remission (H)     Non morbid obesity, unspecified obesity type       Current Outpatient Prescriptions   Medication Sig Dispense Refill     potassium chloride SA (KLOR-CON) 20 MEQ CR tablet Take 1 tablet (20 mEq) by mouth daily After taking tid x 2 days (Patient taking differently: Take 20 mEq by mouth as needed After taking tid x 2 days) 90 tablet 1     atenolol (TENORMIN) 50 MG tablet Take 1 tablet (50 mg) by mouth daily 90 tablet 0     FLUoxetine (PROZAC) 40 MG capsule Take 1 capsule (40 mg) by mouth daily 30 capsule 3     albuterol (PROAIR HFA, PROVENTIL HFA, VENTOLIN HFA) 108 (90 BASE) MCG/ACT inhaler Inhale 2 puffs into the lungs every 6 hours as needed for shortness of breath / dyspnea or wheezing 1 Inhaler 1     fluticasone (FLONASE) 50 MCG/ACT nasal spray Spray 1-2 sprays into both nostrils daily (Patient taking differently: Spray 1-2 sprays into both nostrils as needed ) 3 Package 3     fluticasone (FLOVENT HFA) 220 MCG/ACT  "inhaler 2 puffs bid and rinse and spit after use 1 Inhaler prn     triamterene-hydrochlorothiazide (DYAZIDE) 37.5-25 MG per capsule Take one tablet by mouth daily (Patient not taking: Reported on 1/4/2018) 30 capsule 3     metFORMIN (GLUCOPHAGE-XR) 500 MG 24 hr tablet Take 1 tablet (500 mg) by mouth daily (with dinner) If doing well after 2 weeks, may increase to 2 tabs with dinner. (Patient not taking: Reported on 1/4/2018) 60 tablet 0         Reviewed and updated as needed this visit by clinical staff  Tobacco  Allergies  Meds  Med Hx  Surg Hx  Fam Hx  Soc Hx      Reviewed and updated as needed this visit by Provider         ROS:  CONSTITUTIONAL:NEGATIVE for fever, chills, change in weight x notes lost weight after the surgery.  RESP:NEGATIVE for significant cough or SOB  CV: NEGATIVE for chest pain, palpitations or peripheral edema  PSYCHIATRIC: NEGATIVE for changes in mood or affect    Had surgery a couple weeks ago.  Not feeling back to old self yet.  Headachy. Mild chills.  Fatigue.    Had some difficulty in the recovery room.   Oxygen levels were low.  Blood pressures were dropping and she was passing out.  Was kept over night for observation and things were ok.    Continues to be tired.     Wants to make sure she is doing OK.    Due to blood pressure being so low, she was told to hold triamterene/HCTZ until seeing me.    Notes mildly puffy.   This is improving.     Taking 2 ibuprofens now per day.  No pain pills since a few days p surgery.          OBJECTIVE:     /78 (BP Location: Right arm, Cuff Size: Adult Large)  Pulse 65  Temp 97.8  F (36.6  C) (Oral)  Resp 16  Ht 5' 9\" (1.753 m)  Wt 212 lb 8 oz (96.4 kg)  LMP 09/29/2006  SpO2 98%  BMI 31.38 kg/m2  Body mass index is 31.38 kg/(m^2).  GENERAL APPEARANCE: alert and no distress  RESP: lungs clear to auscultation - no rales, rhonchi or wheezes  CV: regular rates and rhythm  ABDOMEN: soft, nontender, without hepatosplenomegaly or masses " and bowel sounds normal  SKIN: there are healing surgical incisions on her chest and lower abdomen. No surrounding erythema.   PSYCH: mentation appears normal and affect normal/bright        ASSESSMENT/PLAN:     Fatigue, unspecified type  Uncertain etiology. It can take some time to recover p surgery. Will check following labs.   - CBC with platelets  - Comprehensive metabolic panel    Hypertension goal BP (blood pressure) < 140/90  Currently looks OK. Her triamterene/HCTZ is on hold at this time. Will continue to hold.   Suggested ongoing monitoring.   She does have plans to work on weight loss.  - Comprehensive metabolic panel      Patient Instructions   Monitor your blood pressure outside of here.   If elevated, we may wish to address.    Avoid overdoing salt.  Be cautious about the ibuprofen.      Aubrie Antoine MD, MD  [facility]

## 2018-01-04 NOTE — PATIENT INSTRUCTIONS
Monitor your blood pressure outside of here.   If elevated, we may wish to address.    Avoid overdoing salt.  Be cautious about the ibuprofen.

## 2018-01-04 NOTE — MR AVS SNAPSHOT
After Visit Summary   1/4/2018    Felisha Gorman    MRN: [id number]           Patient Information     Date Of Birth          1960        Visit Information        Provider Department      1/4/2018 3:50 PM Aubrie Antoine MD [facility]        Today's Diagnoses     Fatigue, unspecified type    -  1    Hypertension goal BP (blood pressure) < 140/90          Care Instructions    Monitor your blood pressure outside of here.   If elevated, we may wish to address.    Avoid overdoing salt.  Be cautious about the ibuprofen.          Follow-ups after your visit        Who to contact     If you have questions or need follow up information about today's clinic visit or your schedule please contact [facility] directly at [phone].  Normal or non-critical lab and imaging results will be communicated to you by ChatStathart, letter or phone within 4 business days after the clinic has received the results. If you do not hear from us within 7 days, please contact the clinic through [address] or phone. If you have a critical or abnormal lab result, we will notify you by phone as soon as possible.  Submit refill requests through [employer] or call your pharmacy and they will forward the refill request to us. Please allow 3 business days for your refill to be completed.          Additional Information About Your Visit        MyCharInside Social Information     [employer] gives you secure access to your electronic health record. If you see a primary care provider, you can also send messages to your care team and make appointments. If you have questions, please call your primary care clinic.  If you do not have a primary care provider, please call [phone] and they will assist you.        Care EveryWhere ID     This is your Care EveryWhere ID. This could be used by other organizations to access your [place] medical records  [id number]        Your Vitals Were     Pulse Temperature Respirations Height Last  "Period Pulse Oximetry    65 97.8  F (36.6  C) (Oral) 16 5' 9\" (1.753 m) 09/29/2006 98%    BMI (Body Mass Index)                   31.38 kg/m2            Blood Pressure from Last 3 Encounters:   01/04/18 130/78   12/01/17 118/70   10/03/17 128/70    Weight from Last 3 Encounters:   01/04/18 212 lb 8 oz (96.4 kg)   12/01/17 218 lb 3.2 oz (99 kg)   10/03/17 227 lb (103 kg)              We Performed the Following     CBC with platelets     Comprehensive metabolic panel          Today's Medication Changes          These changes are accurate as of: 1/4/18  4:43 PM.  If you have any questions, ask your nurse or doctor.               These medicines have changed or have updated prescriptions.        Dose/Directions    fluticasone 50 MCG/ACT spray   Commonly known as:  FLONASE   This may have changed:    - when to take this  - reasons to take this   Used for:  Allergy, unspecified not elsewhere classified        Dose:  1-2 spray   Spray 1-2 sprays into both nostrils daily   Quantity:  3 Package   Refills:  3       potassium chloride SA 20 MEQ CR tablet   Commonly known as:  KLOR-CON   This may have changed:    - when to take this  - reasons to take this  - additional instructions   Used for:  Hypokalemia        Dose:  20 mEq   Take 1 tablet (20 mEq) by mouth daily After taking tid x 2 days   Quantity:  90 tablet   Refills:  1                Primary Care Provider Office Phone # Fax #    Aubrie Antoine [phone] [address] [facility] [address]        Equal Access to Services     MORIS ALONZO AH: Hadii luna villafuerteo Soyoandy, waaxda luqadaha, qaybta kaalmada adeegyada, osman idisheyla tanner. So [facility] [phone].    ATENCIÓN: Si habla español, tiene a see disposición servicios gratuitos de asistencia lingüística. Llame al [phone].    We comply with applicable federal civil rights laws and [place] laws. We do not discriminate on the basis of race, color, national origin, " age, disability, sex, sexual orientation, or gender identity.            Thank you!     Thank you for choosing [facility]  for your care. Our goal is always to provide you with excellent care. Hearing back from our patients is one way we can continue to improve our services. Please take a few minutes to complete the written survey that you may receive in the mail after your visit with us. Thank you!             Your Updated Medication List - Protect others around you: Learn how to safely use, store and throw away your medicines at www.disposemymeds.org.          This list is accurate as of: 1/4/18  4:43 PM.  Always use your most recent med list.                   Brand Name Dispense Instructions for use Diagnosis    albuterol 108 (90 BASE) MCG/ACT Inhaler    PROAIR HFA/PROVENTIL HFA/VENTOLIN HFA    1 Inhaler    Inhale 2 puffs into the lungs every 6 hours as needed for shortness of breath / dyspnea or wheezing    Mild intermittent asthma without complication       atenolol 50 MG tablet    TENORMIN    90 tablet    Take 1 tablet (50 mg) by mouth daily    Essential hypertension, benign       FLUoxetine 40 MG capsule    PROzac    30 capsule    Take 1 capsule (40 mg) by mouth daily    Recurrent major depressive disorder, in partial remission (H)       fluticasone 220 MCG/ACT Inhaler    FLOVENT HFA    1 Inhaler    2 puffs bid and rinse and spit after use    Mild intermittent asthma       fluticasone 50 MCG/ACT spray    FLONASE    3 Package    Spray 1-2 sprays into both nostrils daily    Allergy, unspecified not elsewhere classified       metFORMIN 500 MG 24 hr tablet    GLUCOPHAGE-XR    60 tablet    Take 1 tablet (500 mg) by mouth daily (with dinner) If doing well after 2 weeks, may increase to 2 tabs with dinner.    Impaired fasting glucose, Non morbid obesity, unspecified obesity type       potassium chloride SA 20 MEQ CR tablet    KLOR-CON    90 tablet    Take 1 tablet (20 mEq) by mouth daily After  taking tid x 2 days    Hypokalemia       triamterene-hydrochlorothiazide 37.5-25 MG per capsule    DYAZIDE    30 capsule    Take one tablet by mouth daily    Hypertension goal BP (blood pressure) < 140/90

## 2018-01-05 LAB
ALBUMIN SERPL-MCNC: 3.2 G/DL (ref 3.4–5)
ALP SERPL-CCNC: 71 U/L (ref 40–150)
ALT SERPL W P-5'-P-CCNC: 33 U/L (ref 0–50)
ANION GAP SERPL CALCULATED.3IONS-SCNC: 4 MMOL/L (ref 3–14)
AST SERPL W P-5'-P-CCNC: 21 U/L (ref 0–45)
BILIRUB SERPL-MCNC: 0.3 MG/DL (ref 0.2–1.3)
BUN SERPL-MCNC: 19 MG/DL (ref 7–30)
CALCIUM SERPL-MCNC: 8.8 MG/DL (ref 8.5–10.1)
CHLORIDE SERPL-SCNC: 110 MMOL/L (ref 94–109)
CO2 SERPL-SCNC: 29 MMOL/L (ref 20–32)
CREAT SERPL-MCNC: 0.58 MG/DL (ref 0.52–1.04)
GFR SERPL CREATININE-BSD FRML MDRD: >90 ML/MIN/1.7M2
GLUCOSE SERPL-MCNC: 93 MG/DL (ref 70–99)
POTASSIUM SERPL-SCNC: 4.1 MMOL/L (ref 3.4–5.3)
PROT SERPL-MCNC: 6.6 G/DL (ref 6.8–8.8)
SODIUM SERPL-SCNC: 143 MMOL/L (ref 133–144)

## 2018-01-05 ASSESSMENT — ANXIETY QUESTIONNAIRES: GAD7 TOTAL SCORE: 0

## 2018-01-05 ASSESSMENT — ASTHMA QUESTIONNAIRES: ACT_TOTALSCORE: 25

## 2018-04-12 ENCOUNTER — OFFICE VISIT (OUTPATIENT)
Dept: INTERNAL MEDICINE | Facility: CLINIC | Age: 58
End: 2018-04-12
Payer: COMMERCIAL

## 2018-04-12 VITALS
TEMPERATURE: 98.5 F | WEIGHT: 210.6 LBS | HEART RATE: 72 BPM | HEIGHT: 69 IN | DIASTOLIC BLOOD PRESSURE: 70 MMHG | SYSTOLIC BLOOD PRESSURE: 120 MMHG | OXYGEN SATURATION: 95 % | BODY MASS INDEX: 31.19 KG/M2 | RESPIRATION RATE: 12 BRPM

## 2018-04-12 DIAGNOSIS — E78.5 HYPERLIPIDEMIA LDL GOAL <160: ICD-10-CM

## 2018-04-12 DIAGNOSIS — F33.41 RECURRENT MAJOR DEPRESSIVE DISORDER, IN PARTIAL REMISSION (H): ICD-10-CM

## 2018-04-12 DIAGNOSIS — D64.9 ANEMIA, UNSPECIFIED TYPE: ICD-10-CM

## 2018-04-12 DIAGNOSIS — R73.01 IMPAIRED FASTING GLUCOSE: ICD-10-CM

## 2018-04-12 DIAGNOSIS — R19.7 DIARRHEA, UNSPECIFIED TYPE: Primary | ICD-10-CM

## 2018-04-12 DIAGNOSIS — Z00.00 ROUTINE GENERAL MEDICAL EXAMINATION AT A HEALTH CARE FACILITY: ICD-10-CM

## 2018-04-12 LAB
ALBUMIN SERPL-MCNC: 3.6 G/DL (ref 3.4–5)
ALP SERPL-CCNC: 95 U/L (ref 40–150)
ALT SERPL W P-5'-P-CCNC: 28 U/L (ref 0–50)
ANION GAP SERPL CALCULATED.3IONS-SCNC: 5 MMOL/L (ref 3–14)
AST SERPL W P-5'-P-CCNC: 12 U/L (ref 0–45)
BILIRUB SERPL-MCNC: 0.3 MG/DL (ref 0.2–1.3)
BUN SERPL-MCNC: 15 MG/DL (ref 7–30)
CALCIUM SERPL-MCNC: 8.5 MG/DL (ref 8.5–10.1)
CHLORIDE SERPL-SCNC: 111 MMOL/L (ref 94–109)
CHOLEST SERPL-MCNC: 222 MG/DL
CO2 SERPL-SCNC: 26 MMOL/L (ref 20–32)
CREAT SERPL-MCNC: 0.73 MG/DL (ref 0.52–1.04)
ERYTHROCYTE [DISTWIDTH] IN BLOOD BY AUTOMATED COUNT: 14.5 % (ref 10–15)
FERRITIN SERPL-MCNC: 89 NG/ML (ref 8–252)
FOLATE SERPL-MCNC: 13.8 NG/ML
GFR SERPL CREATININE-BSD FRML MDRD: 82 ML/MIN/1.7M2
GLUCOSE SERPL-MCNC: 91 MG/DL (ref 70–99)
HBA1C MFR BLD: 5.8 % (ref 0–6.4)
HCT VFR BLD AUTO: 43.7 % (ref 35–47)
HDLC SERPL-MCNC: 35 MG/DL
HGB BLD-MCNC: 14.1 G/DL (ref 11.7–15.7)
IRON SATN MFR SERPL: 17 % (ref 15–46)
IRON SERPL-MCNC: 47 UG/DL (ref 35–180)
LDLC SERPL CALC-MCNC: 153 MG/DL
MCH RBC QN AUTO: 27.4 PG (ref 26.5–33)
MCHC RBC AUTO-ENTMCNC: 32.3 G/DL (ref 31.5–36.5)
MCV RBC AUTO: 85 FL (ref 78–100)
NONHDLC SERPL-MCNC: 187 MG/DL
PLATELET # BLD AUTO: 379 10E9/L (ref 150–450)
POTASSIUM SERPL-SCNC: 3.9 MMOL/L (ref 3.4–5.3)
PROT SERPL-MCNC: 7.3 G/DL (ref 6.8–8.8)
RBC # BLD AUTO: 5.15 10E12/L (ref 3.8–5.2)
RETICS # AUTO: 59.1 10E9/L (ref 25–95)
RETICS/RBC NFR AUTO: 1.1 % (ref 0.5–2)
SODIUM SERPL-SCNC: 142 MMOL/L (ref 133–144)
TIBC SERPL-MCNC: 276 UG/DL (ref 240–430)
TRIGL SERPL-MCNC: 171 MG/DL
TSH SERPL DL<=0.005 MIU/L-ACNC: 1.64 MU/L (ref 0.4–4)
VIT B12 SERPL-MCNC: 400 PG/ML (ref 193–986)
WBC # BLD AUTO: 8.9 10E9/L (ref 4–11)

## 2018-04-12 PROCEDURE — 82728 ASSAY OF FERRITIN: CPT | Performed by: INTERNAL MEDICINE

## 2018-04-12 PROCEDURE — 84443 ASSAY THYROID STIM HORMONE: CPT | Performed by: INTERNAL MEDICINE

## 2018-04-12 PROCEDURE — 85045 AUTOMATED RETICULOCYTE COUNT: CPT | Performed by: INTERNAL MEDICINE

## 2018-04-12 PROCEDURE — 80053 COMPREHEN METABOLIC PANEL: CPT | Performed by: INTERNAL MEDICINE

## 2018-04-12 PROCEDURE — 99214 OFFICE O/P EST MOD 30 MIN: CPT | Performed by: INTERNAL MEDICINE

## 2018-04-12 PROCEDURE — 80061 LIPID PANEL: CPT | Performed by: INTERNAL MEDICINE

## 2018-04-12 PROCEDURE — 83036 HEMOGLOBIN GLYCOSYLATED A1C: CPT | Performed by: INTERNAL MEDICINE

## 2018-04-12 PROCEDURE — 36415 COLL VENOUS BLD VENIPUNCTURE: CPT | Performed by: INTERNAL MEDICINE

## 2018-04-12 PROCEDURE — 83540 ASSAY OF IRON: CPT | Performed by: INTERNAL MEDICINE

## 2018-04-12 PROCEDURE — 82746 ASSAY OF FOLIC ACID SERUM: CPT | Performed by: INTERNAL MEDICINE

## 2018-04-12 PROCEDURE — 85027 COMPLETE CBC AUTOMATED: CPT | Performed by: INTERNAL MEDICINE

## 2018-04-12 PROCEDURE — 83550 IRON BINDING TEST: CPT | Performed by: INTERNAL MEDICINE

## 2018-04-12 PROCEDURE — 82607 VITAMIN B-12: CPT | Performed by: INTERNAL MEDICINE

## 2018-04-12 NOTE — MR AVS SNAPSHOT
After Visit Summary   4/12/2018    Felisha Gorman    MRN: [id number]           Patient Information     Date Of Birth          1960        Visit Information        Provider Department      4/12/2018 8:00 AM Naomi Archibald MD [facility]        Today's Diagnoses     Recurrent major depressive disorder, in partial remission (H)    -  1    Diarrhea, unspecified type        Impaired fasting glucose        Hyperlipidemia LDL goal <160        Anemia, unspecified type        Routine general medical examination at a health care facility          Care Instructions    Plan:  1. Stool tests and labs today   2. Try peptobismol   3.Cetaphil or Cerave cream for dry skin  4. Please make a follow up appointment for fasting labs in 2-3 weeks          Follow-ups after your visit        Future tests that were ordered for you today     Open Standing Orders        Priority Remaining Interval Expires Ordered    Clostridium difficile Toxin B PCR Routine 3/3  10/12/2018 4/12/2018    Enteric Bacteria and Virus Panel by SHAMIKA Stool Routine 3/3  10/12/2018 4/12/2018            Who to contact     If you have questions or need follow up information about today's clinic visit or your schedule please contact [facility] directly at [phone].  Normal or non-critical lab and imaging results will be communicated to you by MyChart, letter or phone within 4 business days after the clinic has received the results. If you do not hear from us within 7 days, please contact the clinic through [employer] Systemshart or phone. If you have a critical or abnormal lab result, we will notify you by phone as soon as possible.  Submit refill requests through T[employer] or call your pharmacy and they will forward the refill request to us. Please allow 3 business days for your refill to be completed.          Additional Information About Your Visit        MyChart Information     [employer] gives you secure access to  "your electronic health record. If you see a primary care provider, you can also send messages to your care team and make appointments. If you have questions, please call your primary care clinic.  If you do not have a primary care provider, please call [phone] and they will assist you.        Care EveryWhere ID     This is your Care EveryWhere ID. This could be used by other organizations to access your [place] medical records  [id number]        Your Vitals Were     Pulse Temperature Respirations Height Last Period Pulse Oximetry    72 98.5  F (36.9  C) (Oral) 12 5' 9\" (1.753 m) 09/29/2006 95%    Breastfeeding? BMI (Body Mass Index)                No 31.1 kg/m2           Blood Pressure from Last 3 Encounters:   04/12/18 120/70   01/04/18 130/78   12/01/17 118/70    Weight from Last 3 Encounters:   04/12/18 210 lb 9.6 oz (95.5 kg)   01/04/18 212 lb 8 oz (96.4 kg)   12/01/17 218 lb 3.2 oz (99 kg)              We Performed the Following     CBC with platelets     Comprehensive metabolic panel     DEPRESSION ACTION PLAN (DAP)     Ferritin     Folate     Hemoglobin A1c     Iron and iron binding capacity     Lipid panel reflex to direct LDL Fasting     Reticulocyte count     TSH with free T4 reflex     Vitamin B12          Today's Medication Changes          These changes are accurate as of 4/12/18  8:50 AM.  If you have any questions, ask your nurse or doctor.               These medicines have changed or have updated prescriptions.        Dose/Directions    fluticasone 50 MCG/ACT spray   Commonly known as:  FLONASE   This may have changed:    - when to take this  - reasons to take this   Used for:  Allergy, unspecified not elsewhere classified        Dose:  1-2 spray   Spray 1-2 sprays into both nostrils daily   Quantity:  3 Package   Refills:  3       potassium chloride SA 20 MEQ CR tablet   Commonly known as:  KLOR-CON   This may have changed:    - when to take this  - reasons to take this  - additional " instructions   Used for:  Hypokalemia        Dose:  20 mEq   Take 1 tablet (20 mEq) by mouth daily After taking tid x 2 days   Quantity:  90 tablet   Refills:  1                Primary Care Provider Office Phone # Fax #    Aubrie Antoine [phone] [address] [address] [facility] [address]        Equal Access to Services     [facility]: Hadii aad ku hadasho Soomaali, waaxda luqadaha, qaybta kaalmada adeegyada, waxay idiin hayaan adeeg kharash la'aan . So [facility] [phone].    ATENCIÓN: Si habla español, tiene a see disposición servicios gratuitos de asistencia lingüística. Llame al [phone].    We comply with applicable federal civil rights laws and [place] laws. We do not discriminate on the basis of race, color, national origin, age, disability, sex, sexual orientation, or gender identity.            Thank you!     Thank you for choosing [facility]  for your care. Our goal is always to provide you with excellent care. Hearing back from our patients is one way we can continue to improve our services. Please take a few minutes to complete the written survey that you may receive in the mail after your visit with us. Thank you!             Your Updated Medication List - Protect others around you: Learn how to safely use, store and throw away your medicines at www.disposemymeds.org.          This list is accurate as of 4/12/18  8:50 AM.  Always use your most recent med list.                   Brand Name Dispense Instructions for use Diagnosis    albuterol 108 (90 Base) MCG/ACT Inhaler    PROAIR HFA/PROVENTIL HFA/VENTOLIN HFA    1 Inhaler    Inhale 2 puffs into the lungs every 6 hours as needed for shortness of breath / dyspnea or wheezing    Mild intermittent asthma without complication       atenolol 50 MG tablet    TENORMIN    90 tablet    Take 1 tablet (50 mg) by mouth daily    Essential hypertension, benign       FLUoxetine 40 MG capsule    PROzac    90 capsule    TAKE  1 CAPSULE(40 MG) BY MOUTH DAILY    Recurrent major depressive disorder, in partial remission (H)       fluticasone 220 MCG/ACT Inhaler    FLOVENT HFA    1 Inhaler    2 puffs bid and rinse and spit after use    Mild intermittent asthma       fluticasone 50 MCG/ACT spray    FLONASE    3 Package    Spray 1-2 sprays into both nostrils daily    Allergy, unspecified not elsewhere classified       potassium chloride SA 20 MEQ CR tablet    KLOR-CON    90 tablet    Take 1 tablet (20 mEq) by mouth daily After taking tid x 2 days    Hypokalemia

## 2018-04-12 NOTE — LETTER
My Depression Action Plan  Name: Felisha Gorman   Date of Birth 1960  Date: 4/12/2018    My doctor: Aubrie Antoine   My clinic: [place] [address]  [facility] [address]  [phone]          GREEN    ZONE   Good Control    What it looks like:     Things are going generally well. You have normal up s and down s. You may even feel depressed from time to time, but bad moods usually last less than a day.   What you need to do:  1. Continue to care for yourself (see self care plan)  2. Check your depression survival kit and update it as needed  3. Follow your physician s recommendations including any medication.  4. Do not stop taking medication unless you consult with your physician first.           YELLOW         ZONE Getting Worse    What it looks like:     Depression is starting to interfere with your life.     It may be hard to get out of bed; you may be starting to isolate yourself from others.    Symptoms of depression are starting to last most all day and this has happened for several days.     You may have suicidal thoughts but they are not constant.   What you need to do:     1. Call your care team, your response to treatment will improve if you keep your care team informed of your progress. Yellow periods are signs an adjustment may need to be made.     2. Continue your self-care, even if you have to fake it!    3. Talk to someone in your support network    4. Open up your depression survival kit           RED    ZONE Medical Alert - Get Help    What it looks like:     Depression is seriously interfering with your life.     You may experience these or other symptoms: You can t get out of bed most days, can t work or engage in other necessary activities, you have trouble taking care of basic hygiene, or basic responsibilities, thoughts of suicide or death that will not go away, self-injurious behavior.     What you need to do:  1. Call your care team and  request a same-day appointment. If they are not available (weekends or after hours) call your local crisis line, emergency room or 911.            Depression Self Care Plan / Survival Kit    Self-Care for Depression  Here s the deal. Your body and mind are really not as separate as most people think.  What you do and think affects how you feel and how you feel influences what you do and think. This means if you do things that people who feel good do, it will help you feel better.  Sometimes this is all it takes.  There is also a place for medication and therapy depending on how severe your depression is, so be sure to consult with your medical provider and/ or Behavioral Health Consultant if your symptoms are worsening or not improving.     In order to better manage my stress, I will:    Exercise  Get some form of exercise, every day. This will help reduce pain and release endorphins, the  feel good  chemicals in your brain. This is almost as good as taking antidepressants!  This is not the same as joining a gym and then never going! (they count on that by the way ) It can be as simple as just going for a walk or doing some gardening, anything that will get you moving.      Hygiene   Maintain good hygiene (Get out of bed in the morning, Make your bed, Brush your teeth, Take a shower, and Get dressed like you were going to work, even if you are unemployed).  If your clothes don't fit try to get ones that do.    Diet  I will strive to eat foods that are good for me, drink plenty of water, and avoid excessive sugar, caffeine, alcohol, and other mood-altering substances.  Some foods that are helpful in depression are: complex carbohydrates, B vitamins, flaxseed, fish or fish oil, fresh fruits and vegetables.    Psychotherapy  I agree to participate in Individual Therapy (if recommended).    Medication  If prescribed medications, I agree to take them.  Missing doses can result in serious side effects.  I understand that  drinking alcohol, or other illicit drug use, may cause potential side effects.  I will not stop my medication abruptly without first discussing it with my provider.    Staying Connected With Others  I will stay in touch with my friends, family members, and my primary care provider/team.    Use your imagination  Be creative.  We all have a creative side; it doesn t matter if it s oil painting, sand castles, or mud pies! This will also kick up the endorphins.    Witness Beauty  (AKA stop and smell the roses) Take a look outside, even in mid-winter. Notice colors, textures. Watch the squirrels and birds.     Service to others  Be of service to others.  There is always someone else in need.  By helping others we can  get out of ourselves  and remember the really important things.  This also provides opportunities for practicing all the other parts of the program.    Humor  Laugh and be silly!  Adjust your TV habits for less news and crime-drama and more comedy.    Control your stress  Try breathing deep, massage therapy, biofeedback, and meditation. Find time to relax each day.     My support system    Clinic Contact:  Phone number:    Contact 1:  Phone number:    Contact 2:  Phone number:    Muslim/:  Phone number:    Therapist:  Phone number:    Local crisis center:    Phone number:    Other community support:  Phone number:

## 2018-04-12 NOTE — PROGRESS NOTES
Dr Valderrama's note      Patient's instructions / PLAN:                                                        Plan:  1. Stool tests and labs today   2. Try peptobismol   3.Cetaphil or Cerave cream for dry skin  4. Please make a follow up appointment for fasting labs in 2-3 weeks    5. Please make an appointment few days after the labs to discuss about the results.       ASSESSMENT & PLAN:                                                      (R19.7) Diarrhea, unspecified type  (primary encounter diagnosis)  Comment:   Plan: Clostridium difficile Toxin B PCR, Enteric         Bacteria and Virus Panel by SHAMIKA Stool,         Comprehensive metabolic panel            (R73.01) Impaired fasting glucose  Comment:   Plan: Hemoglobin A1c, Comprehensive metabolic panel            (E78.5) Hyperlipidemia LDL goal <160  Comment:   Plan: TSH with free T4 reflex, Lipid panel reflex to         direct LDL Fasting, Comprehensive metabolic         panel            (D64.9) Anemia, unspecified type  Comment:   Plan: Ferritin, CBC with platelets, Iron and iron         binding capacity, Folate, Reticulocyte count,         Vitamin B12, TSH with free T4 reflex,         Comprehensive metabolic panel            (F33.41) Recurrent major depressive disorder, in partial remission (H)  Comment:   Plan: DEPRESSION ACTION PLAN (DAP)            (Z00.00) Routine general medical examination at a health care facility  Comment:   Plan: Ferritin, CBC with platelets, Iron and iron         binding capacity, Folate, Reticulocyte count,         Vitamin B12, TSH with free T4 reflex, Lipid         panel reflex to direct LDL Fasting, Hemoglobin         A1c, Comprehensive metabolic panel               Chief complaint:                                                           SUBJECTIVE:   Felisha Gorman is a 58 year old female who presents to clinic today for the following health issues:    *Gastrointestinal Problem-  -- started 2 weeks ago  -- it comes after she  "eats ground turkey, she feels her bowel get inflamed and has loose BM. She thinks it was cooked well  -- these symptoms are daily: feels bloated, a lot of gas bad bridgette, some cramps. The stools are soft to runny.   -- no blood in stools but tinge of blood when she wipes a lot  -- colonoscopy 2009 with anthony, received letter that 10 y colonoscopy    -- 2-3 years ago has similar symptoms that lasted 1-1.5 y that cleared after she was treated with Abx for UTI. She traveled abroad at that time.     HTN  Was on triamterene and that made her heart rate very fast, so she was started on atenolol to counteract that. Her triamterene was d/c, so now she is not sure if she is supposed to still take the atenolol or not? Has not been checking her BP.     Increased BS  She also has not been taking the Metformin, says she tried it a long time ago and thinks she stopped this one because it upset her stomach.         Review of Systems:                                                      ROS: negative for fever, chills, cough, wheezes, chest pain, shortness of breath, vomiting, abdominal pain, leg swelling          OBJECTIVE:             Physical exam:  Blood pressure 120/70, pulse 72, temperature 98.5  F (36.9  C), temperature source Oral, resp. rate 12, height 5' 9\" (1.753 m), weight 210 lb 9.6 oz (95.5 kg), last menstrual period 09/29/2006, SpO2 95 %, not currently breastfeeding.   NAD, appears comfortable  Skin: no rashes   HEENT: PERRLA, EOMI, pink conjunctiva, anicteric sclerae, bilateral tympanic membranes are clinically normal, oropharynx is normal color  Neck: supple, no JVDNo thyroidmegaly. Lymph nodes nonpalpable cervical and supraclavicular.  Chest: clear to auscultation bilaterally, good respiratory effort  Heart: S1 S2, RRR, no mgr appreciated  Abdomen: soft, not tender, no hepatosplenomegaly or masses appreciated, no abdominal bruit, present bowel sounds  Extremities: no edema,   Neurologic: A, Ox3, no focal signs " appreciated    PMHx: reviewed  Past Medical History:   Diagnosis Date     Allergy, unspecified not elsewhere classified      Dysthymic disorder      Essential hypertension, benign      flight anxiety      Frequent BRONCHITIS     usually in winter     Impaired fasting glucose 9/23/2007    Glucose 101 9/07     Mild intermittent asthma     URI induced     Obstructive sleep apnea (adult) (pediatric)     not on CPAP; feels rested 2014     Other and unspecified hyperlipidemia      Rheumatic fever without mention of heart involvement     no sequelae     transient neurologic sx 2005    ? TIA; had MRI with temporal lobe lesion that is getting smaller      PSHx: reviewed  Past Surgical History:   Procedure Laterality Date     C NONSPECIFIC PROCEDURE  1/02    nasal polyp removed     COLONOSCOPY  7/09    hyperplastic polyp; repeat 10 years     SURGICAL HISTORY OF -   1/06    endometrial ablation     SURGICAL HISTORY OF -   12/18/17    bilateral breast reduction and tummy tuck     TONSILLECTOMY          Meds: reviewed  Current Outpatient Prescriptions   Medication Sig Dispense Refill     FLUoxetine (PROZAC) 40 MG capsule TAKE 1 CAPSULE(40 MG) BY MOUTH DAILY 90 capsule 0     albuterol (PROAIR HFA, PROVENTIL HFA, VENTOLIN HFA) 108 (90 BASE) MCG/ACT inhaler Inhale 2 puffs into the lungs every 6 hours as needed for shortness of breath / dyspnea or wheezing 1 Inhaler 1     fluticasone (FLONASE) 50 MCG/ACT nasal spray Spray 1-2 sprays into both nostrils daily (Patient taking differently: Spray 1-2 sprays into both nostrils as needed ) 3 Package 3     fluticasone (FLOVENT HFA) 220 MCG/ACT inhaler 2 puffs bid and rinse and spit after use 1 Inhaler prn     potassium chloride SA (KLOR-CON) 20 MEQ CR tablet Take 1 tablet (20 mEq) by mouth daily After taking tid x 2 days (Patient taking differently: Take 20 mEq by mouth as needed After taking tid x 2 days) 90 tablet 1     atenolol (TENORMIN) 50 MG tablet Take 1 tablet (50 mg) by mouth daily  90 tablet 0       Soc Hx: reviewed  Fam Hx: reviewed          Naomi Valderrama MD  Internal Medicine

## 2018-04-12 NOTE — NURSING NOTE
"Chief Complaint   Patient presents with     Gastrointestinal Problem     Establish Care     Recheck Medication       Initial /70 (BP Location: Left arm, Patient Position: Chair, Cuff Size: Adult Large)  Pulse 72  Temp 98.5  F (36.9  C) (Oral)  Resp 12  Ht 5' 9\" (1.753 m)  Wt 210 lb 9.6 oz (95.5 kg)  LMP 09/29/2006  SpO2 95%  Breastfeeding? No  BMI 31.1 kg/m2 Estimated body mass index is 31.1 kg/(m^2) as calculated from the following:    Height as of this encounter: 5' 9\" (1.753 m).    Weight as of this encounter: 210 lb 9.6 oz (95.5 kg).  Medication Reconciliation: complete   Pap discussed, pt was advised to schedule when she is able.   Claudia Henry CMA      "

## 2018-04-12 NOTE — PATIENT INSTRUCTIONS
Plan:  1. Stool tests and labs today   2. Try peptobismol   3.Cetaphil or Cerave cream for dry skin  4. Please make a follow up appointment for fasting labs in 2-3 weeks

## 2018-04-26 PROCEDURE — 87506 IADNA-DNA/RNA PROBE TQ 6-11: CPT | Performed by: INTERNAL MEDICINE

## 2018-04-26 PROCEDURE — 87493 C DIFF AMPLIFIED PROBE: CPT | Performed by: INTERNAL MEDICINE

## 2018-04-27 DIAGNOSIS — R19.7 DIARRHEA, UNSPECIFIED TYPE: ICD-10-CM

## 2018-04-27 LAB
C DIFF TOX B STL QL: NEGATIVE
SPECIMEN SOURCE: NORMAL

## 2018-05-09 ENCOUNTER — TELEPHONE (OUTPATIENT)
Dept: INTERNAL MEDICINE | Facility: CLINIC | Age: 58
End: 2018-05-09

## 2018-05-09 DIAGNOSIS — R19.7 DIARRHEA, UNSPECIFIED TYPE: Primary | ICD-10-CM

## 2018-05-09 NOTE — TELEPHONE ENCOUNTER
Pt calls, states she has continued to have soft stools with lots of gas since 04/12 appt with Dr. Valderrama. Indicates symptoms haven't gotten better, but haven't gotten worse. Reports small amount of blood noted, but r/t irritation from frequently wiping. Denies mucus in stool.    Per 04/26 results stool was negative for C-diff and enteric bacteria/virus panel.    Pt leaves for vacation in 1.5 weeks and is concerned about flying and her trip. Hoping to get some relief before then.    States in these symptoms happened a few years ago. She didn't seek care at the time for the stools, but did have a positive UTI they treated with antibx that resolved the stool symptoms as well as UTI. Pt states it was a provider other than Dr. Arash del toro/ [facility]. Dr. Simon saw pt 09/04/14 with positive UTI per epic med list Macrobid. Pt asking if antibx can be prescribed for her. Discuss MD may or may not be comfortable with this. Discuss MD may recommend GI referral. Pt concerned about the time it takes to get in to see GI. Hoping to address symptoms before her upcoming trip.    Please advise, thanks.

## 2018-05-10 RX ORDER — CIPROFLOXACIN 500 MG/1
500 TABLET, FILM COATED ORAL 2 TIMES DAILY
Qty: 6 TABLET | Refills: 0 | Status: SHIPPED | OUTPATIENT
Start: 2018-05-10 | End: 2018-05-13

## 2018-05-10 NOTE — TELEPHONE ENCOUNTER
Antibiotics not really indicated. But she leaves for vacation....   Cipro Rx done ( 500 bid x 3 days)

## 2018-09-07 ENCOUNTER — MYC REFILL (OUTPATIENT)
Dept: FAMILY MEDICINE | Facility: CLINIC | Age: 58
End: 2018-09-07

## 2018-09-07 DIAGNOSIS — F33.41 RECURRENT MAJOR DEPRESSIVE DISORDER, IN PARTIAL REMISSION (H): ICD-10-CM

## 2018-09-07 NOTE — TELEPHONE ENCOUNTER
"Prozac 40 mg    Last Written Prescription Date:  2/22/2018  Last Fill Quantity: 90,  # refills: 0   Last office visit: 1/4/2018 with prescribing provider:  LOV for medication 4/12/2018 with JAZMINE Archibald.   Future Office Visit:     Requested Prescriptions   Pending Prescriptions Disp Refills     FLUoxetine (PROZAC) 40 MG capsule 90 capsule 0    SSRIs Protocol Failed    9/7/2018  9:50 AM       Failed - PHQ-9 score less than 5 in past 6 months    Please review last PHQ-9 score.          Failed - Recent (6 mo) or future (30 days) visit within the authorizing provider's specialty    Patient had office visit in the last 6 months or has a visit in the next 30 days with authorizing provider or within the authorizing provider's specialty.  See \"Patient Info\" tab in inbasket, or \"Choose Columns\" in Meds & Orders section of the refill encounter.           Passed - Patient is age 18 or older       Passed - No active pregnancy on record       Passed - No positive pregnancy test in last 12 months        PHQ-9 SCORE 4/7/2017 6/8/2017 1/4/2018   Total Score - - -   Total Score Paulhart - - -   Total Score 12 4 6      BOBBY-7 SCORE 4/7/2017 6/8/2017 1/4/2018   Total Score - - -   Total Score - - -   Total Score 4 0 0     Sent  questionnaires to patient per herb.    Madison Earl RN    "

## 2018-09-07 NOTE — TELEPHONE ENCOUNTER
Message from SNADEChart:  Original authorizing provider: Aubrie Antoine MD, MD Felisha Gorman would like a refill of the following medications:  FLUoxetine (PROZAC) 40 MG capsule [Aubrie Antoine MD, MD]    Preferred pharmacy: [facility] [address] [place] [address] [address] [facility] [address]    Comment:

## 2018-09-12 RX ORDER — FLUOXETINE 40 MG/1
40 CAPSULE ORAL DAILY
Qty: 30 CAPSULE | Refills: 0 | Status: SHIPPED | OUTPATIENT
Start: 2018-09-12 | End: 2018-10-21

## 2018-09-12 NOTE — TELEPHONE ENCOUNTER
PHQ-9 SCORE 6/8/2017 1/4/2018 9/11/2018   Total Score - - -   Total Score MyChart - - 11 (Moderate depression)   Total Score 4 6 11       BOBBY-7 SCORE 6/8/2017 1/4/2018 9/11/2018   Total Score - - -   Total Score - - 4 (minimal anxiety)   Total Score 0 0 4     Sent patient mychart message as last refill 2/22/2018 for 3 month supply.    PHQ9 higher score. Need to know if patient has taken consistently.    Madison Earl RN

## 2018-09-12 NOTE — TELEPHONE ENCOUNTER
Spoke with patient. Patient states has been out of medication for 2 weeks. PHQ9 score is higher. Patient states it is because she was out of medication. Patient would like refilled today.    Huddled with RUSTAM. Verbal ok to refill x 1 month.     Advised patient would refill 1 month. Need to follow up with Dr. Antoine with some type of visit.    Patient agreeable to plan.    Madison Earl RN

## 2018-12-26 DIAGNOSIS — I10 HYPERTENSION GOAL BP (BLOOD PRESSURE) < 140/90: ICD-10-CM

## 2018-12-26 RX ORDER — TRIAMTERENE AND HYDROCHLOROTHIAZIDE 37.5; 25 MG/1; MG/1
CAPSULE ORAL
Qty: 30 CAPSULE | Refills: 0 | OUTPATIENT
Start: 2018-12-26

## 2018-12-26 NOTE — TELEPHONE ENCOUNTER
Denied-not on the med list, patient is due for appointment, sent a note to the pharmacy asking for patient to call us.   Yesi Pina RN  Message handled by Nurse Triage.

## 2019-03-27 ENCOUNTER — OFFICE VISIT (OUTPATIENT)
Dept: FAMILY MEDICINE | Facility: CLINIC | Age: 59
End: 2019-03-27
Payer: COMMERCIAL

## 2019-03-27 VITALS
DIASTOLIC BLOOD PRESSURE: 72 MMHG | WEIGHT: 225 LBS | SYSTOLIC BLOOD PRESSURE: 150 MMHG | HEIGHT: 69 IN | OXYGEN SATURATION: 96 % | HEART RATE: 78 BPM | BODY MASS INDEX: 33.33 KG/M2 | TEMPERATURE: 98.3 F

## 2019-03-27 DIAGNOSIS — J20.9 ACUTE BRONCHITIS WITH SYMPTOMS > 10 DAYS: ICD-10-CM

## 2019-03-27 DIAGNOSIS — J45.21 MILD INTERMITTENT ASTHMA WITH ACUTE EXACERBATION: Primary | ICD-10-CM

## 2019-03-27 PROCEDURE — 99214 OFFICE O/P EST MOD 30 MIN: CPT | Performed by: NURSE PRACTITIONER

## 2019-03-27 RX ORDER — ALBUTEROL SULFATE 90 UG/1
2 AEROSOL, METERED RESPIRATORY (INHALATION) EVERY 6 HOURS
Qty: 18 G | Refills: 3 | Status: SHIPPED | OUTPATIENT
Start: 2019-03-27 | End: 2024-07-10

## 2019-03-27 RX ORDER — AZITHROMYCIN 250 MG/1
TABLET, FILM COATED ORAL
Qty: 6 TABLET | Refills: 0 | Status: SHIPPED | OUTPATIENT
Start: 2019-03-27 | End: 2019-06-12

## 2019-03-27 RX ORDER — FLUTICASONE PROPIONATE 220 UG/1
2 AEROSOL, METERED RESPIRATORY (INHALATION) 2 TIMES DAILY
Qty: 1 INHALER | Refills: 3 | Status: SHIPPED | OUTPATIENT
Start: 2019-03-27 | End: 2019-06-12

## 2019-03-27 RX ORDER — ALBUTEROL SULFATE 90 UG/1
2 AEROSOL, METERED RESPIRATORY (INHALATION) EVERY 6 HOURS PRN
Status: CANCELLED | OUTPATIENT
Start: 2019-03-27

## 2019-03-27 ASSESSMENT — ANXIETY QUESTIONNAIRES
7. FEELING AFRAID AS IF SOMETHING AWFUL MIGHT HAPPEN: NOT AT ALL
6. BECOMING EASILY ANNOYED OR IRRITABLE: SEVERAL DAYS
GAD7 TOTAL SCORE: 5
3. WORRYING TOO MUCH ABOUT DIFFERENT THINGS: SEVERAL DAYS
2. NOT BEING ABLE TO STOP OR CONTROL WORRYING: SEVERAL DAYS
5. BEING SO RESTLESS THAT IT IS HARD TO SIT STILL: NOT AT ALL
IF YOU CHECKED OFF ANY PROBLEMS ON THIS QUESTIONNAIRE, HOW DIFFICULT HAVE THESE PROBLEMS MADE IT FOR YOU TO DO YOUR WORK, TAKE CARE OF THINGS AT HOME, OR GET ALONG WITH OTHER PEOPLE: NOT DIFFICULT AT ALL
1. FEELING NERVOUS, ANXIOUS, OR ON EDGE: SEVERAL DAYS

## 2019-03-27 ASSESSMENT — PATIENT HEALTH QUESTIONNAIRE - PHQ9
5. POOR APPETITE OR OVEREATING: SEVERAL DAYS
SUM OF ALL RESPONSES TO PHQ QUESTIONS 1-9: 9

## 2019-03-27 ASSESSMENT — MIFFLIN-ST. JEOR: SCORE: 1649.97

## 2019-03-27 NOTE — PROGRESS NOTES
"  SUBJECTIVE:                                                      Felisha Gorman is a 59 year old female who presents to clinic today for the following health issues:    Acute Illness   Acute illness concerns: cough   Onset: 6 weeks     Fever: YES- she feels like she may     Chills/Sweats: no    Headache (location?): YES    Sinus Pressure:YES    Conjunctivitis:  no    Ear Pain: YES: right    Rhinorrhea: YES    Congestion: YES    Sore Throat: no     Cough: YES, productive and dry    Wheeze: YES    Decreased Appetite: YES    Nausea: no    Vomiting: no    Diarrhea:  no    Dysuria/Freq.: no    Fatigue/Achiness: YES    Sick/Strep Exposure: no     Therapies Tried and outcome: nothing     HPI: Cough and wheeze for one month. Getting slightly worse. Cough is mostly dry. Endorses wheeze and shortness of breath. Also endorses mild fever and sinus congestion. Denies rash, nausea, vomiting, body aches, fatigue, chills / sweats. Has mild intermittent asthma, which she feels is likely exacerbated given the duration of these symptoms. Has not been using albuterol, however.     Problem list and histories reviewed & adjusted, as indicated.  Additional history: as documented    Reviewed and updated as needed this visit by clinical staff  Tobacco  Allergies  Meds  Problems  Med Hx  Surg Hx  Fam Hx       Reviewed and updated as needed this visit by Provider  Tobacco  Allergies  Meds  Problems  Med Hx  Surg Hx  Fam Hx         ROS:  Constitutional, HEENT, pulmonary, GI, musculoskeletal systems are negative, except as otherwise noted.    OBJECTIVE:                                                      /72 (BP Location: Left arm, Patient Position: Chair, Cuff Size: Adult Large)   Pulse 78   Temp 98.3  F (36.8  C) (Tympanic)   Ht 1.753 m (5' 9\")   Wt 102.1 kg (225 lb)   LMP 09/29/2006   SpO2 96%   BMI 33.23 kg/m   Body mass index is 33.23 kg/m .   GENERAL: healthy, alert, well nourished, well hydrated, no " distress  HENT: ear canals- normal; TMs- normal; Nose- normal; Mouth- mildly erythematous posterior oropharynx, but without edema or exudate noted  NECK: no tenderness, no adenopathy, no asymmetry, no masses, no stiffness; thyroid- normal to palpation  RESP: mild expiratory wheezes noted throughout all lung fields. Chest excursion, respiratory rate, and ventilatory effort / ease within normal limits. No indicators of respiratory distress.  CV: regular rates and rhythm, normal S1 S2, no S3 or S4 and no murmur, no click or rub -    Diagnostic test results:  none     ASSESSMENT/PLAN:                                                      Felisha was seen today for cough and wheeze. Asthma is clearly exacerbated. Declines oral steroid at this time. This seems reasonable given that she has not used albuterol yet. Will encourage use of bronchodilator and ask her to resume Flovent as daily controller medication. It is clear that she could use this given her persistent symptoms since having stopped it. Given some constitutional symptoms, it is reasonable to treat bronchitis antimicrobially. Will order azithromycin for 5 days and have her follow up in 7 days to report on symptom status. Discussed reasons to call or return to clinic. Felisha acknowledges and demonstrates understanding of circumstances under which care should be sought urgently or emergently. Follow up as discussed. Discussed risks, benefits, alternatives, potential side effects, and proper administration of new medication / treatment. Agrees with plan of care. All questions answered.     Diagnoses and all orders for this visit:    Mild intermittent asthma with acute exacerbation  -     fluticasone (FLOVENT HFA) 220 MCG/ACT inhaler; Inhale 2 puffs into the lungs 2 times daily 2 puffs bid and rinse and spit after use  -     albuterol (VENTOLIN HFA) 108 (90 Base) MCG/ACT inhaler; Inhale 2 puffs into the lungs every 6 hours    Acute bronchitis with symptoms > 10  days  -     azithromycin (ZITHROMAX) 250 MG tablet; Take 2 tablets (500 mg) by mouth daily for 1 day, THEN 1 tablet (250 mg) daily for 4 days.      Risks, benefits and alternatives of treatments discussed. Plan agreed upon and all questions answered.      Follow-Up: Return in about 10 days (around 4/6/2019) for persistent or worsening symptoms.    See Patient Instructions      Chad Canela, COLETTE, CNP

## 2019-03-27 NOTE — LETTER
My Asthma Action Plan  Name: Felisha Gorman   YOB: 1960  Date: 3/27/2019   My doctor: Chad Canela NP   [facility]        My Control Medicine: None  My Rescue Medicine: Albuterol (Proair/Ventolin/Proventil) inhaler as needed   My Asthma Severity: intermittent  Avoid your asthma triggers: upper respiratory infections and pollens               GREEN ZONE   Good Control    I feel good    No cough or wheeze    Can work, sleep and play without asthma symptoms       Take your asthma control medicine every day.     1. If exercise triggers your asthma, take your rescue medication    15 minutes before exercise or sports, and    During exercise if you have asthma symptoms  2. Spacer to use with inhaler: If you have a spacer, make sure to use it with your inhaler             YELLOW ZONE Getting Worse  I have ANY of these:    I do not feel good    Cough or wheeze    Chest feels tight    Wake up at night   1. Keep taking your Green Zone medications  2. Start taking your rescue medicine:    every 20 minutes for up to 1 hour. Then every 4 hours for 24-48 hours.  3. If you stay in the Yellow Zone for more than 12-24 hours, contact your doctor.  4. If you do not return to the Green Zone in 12-24 hours or you get worse, start taking your oral steroid medicine if prescribed by your provider.           RED ZONE Medical Alert - Get Help  I have ANY of these:    I feel awful    Medicine is not helping    Breathing getting harder    Trouble walking or talking    Nose opens wide to breathe       1. Take your rescue medicine NOW  2. If your provider has prescribed an oral steroid medicine, start taking it NOW  3. Call your doctor NOW  4. If you are still in the Red Zone after 20 minutes and you have not reached your doctor:    Take your rescue medicine again and    Call 911 or go to the emergency room right away    See your regular doctor within 2 weeks of an Emergency Room or Urgent Care  visit for follow-up treatment.          Annual Reminders:  Meet with Asthma Educator,  Flu Shot in the Fall, consider Pneumonia Vaccination for patients with asthma (aged 19 and older).    Pharmacy: [facility] [address] [place] [address] - [address] [place] [address] [address] AT [facility] OF [address]                      Asthma Triggers  How To Control Things That Make Your Asthma Worse    Triggers are things that make your asthma worse.  Look at the list below to help you find your triggers and what you can do about them.  You can help prevent asthma flare-ups by staying away from your triggers.      Trigger                                                          What you can do   Cigarette Smoke  Tobacco smoke can make asthma worse. Do not allow smoking in your home, car or around you.  Be sure no one smokes at a child s day care or school.  If you smoke, ask your health care provider for ways to help you quit.  Ask family members to quit too.  Ask your health care provider for a referral to Quit Plan to help you quit smoking, or call 0-330-375-PLAN.     Colds, Flu, Bronchitis  These are common triggers of asthma. Wash your hands often.  Don t touch your eyes, nose or mouth.  Get a flu shot every year.     Dust Mites  These are tiny bugs that live in cloth or carpet. They are too small to see. Wash sheets and blankets in hot water every week.   Encase pillows and mattress in dust mite proof covers.  Avoid having carpet if you can. If you have carpet, vacuum weekly.   Use a dust mask and HEPA vacuum.   Pollen and Outdoor Mold  Some people are allergic to trees, grass, or weed pollen, or molds. Try to keep your windows closed.  Limit time out doors when pollen count is high.   Ask you health care provider about taking medicine during allergy season.     Animal Dander  Some people are allergic to skin flakes, urine or saliva from pets with fur or feathers. Keep pets with fur or feathers out of your home.    If you can t  keep the pet outdoors, then keep the pet out of your bedroom.  Keep the bedroom door closed.  Keep pets off cloth furniture and away from stuffed toys.     Mice, Rats, and Cockroaches  Some people are allergic to the waste from these pests.   Cover food and garbage.  Clean up spills and food crumbs.  Store grease in the refrigerator.   Keep food out of the bedroom.   Indoor Mold  This can be a trigger if your home has high moisture. Fix leaking faucets, pipes, or other sources of water.   Clean moldy surfaces.  Dehumidify basement if it is damp and smelly.   Smoke, Strong Odors, and Sprays  These can reduce air quality. Stay away from strong odors and sprays, such as perfume, powder, hair spray, paints, smoke incense, paint, cleaning products, candles and new carpet.   Exercise or Sports  Some people with asthma have this trigger. Be active!  Ask your doctor about taking medicine before sports or exercise to prevent symptoms.    Warm up for 5-10 minutes before and after sports or exercise.     Other Triggers of Asthma  Cold air:  Cover your nose and mouth with a scarf.  Sometimes laughing or crying can be a trigger.  Some medicines and food can trigger asthma.

## 2019-03-27 NOTE — PATIENT INSTRUCTIONS
Start Flovent every day - twice a day  Use albuterol for acute wheeze, cough, shortness of breath  Take Zithromax for potential bacterial bronchitis / lower respiratory tract infection

## 2019-03-28 ASSESSMENT — ASTHMA QUESTIONNAIRES: ACT_TOTALSCORE: 24

## 2019-03-28 ASSESSMENT — ANXIETY QUESTIONNAIRES: GAD7 TOTAL SCORE: 5

## 2019-05-30 ENCOUNTER — E-VISIT (OUTPATIENT)
Dept: FAMILY MEDICINE | Facility: CLINIC | Age: 59
End: 2019-05-30
Payer: COMMERCIAL

## 2019-05-30 DIAGNOSIS — A60.00 GENITAL HERPES SIMPLEX, UNSPECIFIED SITE: ICD-10-CM

## 2019-05-30 PROCEDURE — 99444 ZZC PHYSICIAN ONLINE EVALUATION & MANAGEMENT SERVICE: CPT | Performed by: FAMILY MEDICINE

## 2019-05-31 RX ORDER — ACYCLOVIR 400 MG/1
400 TABLET ORAL 3 TIMES DAILY
Qty: 21 TABLET | Refills: 1 | Status: SHIPPED | OUTPATIENT
Start: 2019-05-31 | End: 2020-07-16

## 2019-06-12 ENCOUNTER — OFFICE VISIT (OUTPATIENT)
Dept: FAMILY MEDICINE | Facility: CLINIC | Age: 59
End: 2019-06-12
Payer: COMMERCIAL

## 2019-06-12 VITALS
OXYGEN SATURATION: 97 % | HEART RATE: 81 BPM | TEMPERATURE: 98.6 F | SYSTOLIC BLOOD PRESSURE: 150 MMHG | DIASTOLIC BLOOD PRESSURE: 80 MMHG | WEIGHT: 228 LBS | BODY MASS INDEX: 33.67 KG/M2

## 2019-06-12 DIAGNOSIS — N89.8 VAGINAL ITCHING: ICD-10-CM

## 2019-06-12 DIAGNOSIS — J45.21 MILD INTERMITTENT ASTHMA WITH ACUTE EXACERBATION: ICD-10-CM

## 2019-06-12 DIAGNOSIS — I10 HYPERTENSION GOAL BP (BLOOD PRESSURE) < 140/90: ICD-10-CM

## 2019-06-12 DIAGNOSIS — R30.0 DYSURIA: Primary | ICD-10-CM

## 2019-06-12 DIAGNOSIS — R05.3 PERSISTENT COUGH: ICD-10-CM

## 2019-06-12 LAB
ALBUMIN UR-MCNC: NEGATIVE MG/DL
APPEARANCE UR: CLEAR
BILIRUB UR QL STRIP: NEGATIVE
COLOR UR AUTO: YELLOW
GLUCOSE UR STRIP-MCNC: NEGATIVE MG/DL
HGB UR QL STRIP: NEGATIVE
KETONES UR STRIP-MCNC: NEGATIVE MG/DL
LEUKOCYTE ESTERASE UR QL STRIP: ABNORMAL
NITRATE UR QL: NEGATIVE
NON-SQ EPI CELLS #/AREA URNS LPF: NORMAL /LPF
PH UR STRIP: 5.5 PH (ref 5–7)
RBC #/AREA URNS AUTO: NORMAL /HPF
SOURCE: ABNORMAL
SP GR UR STRIP: 1.02 (ref 1–1.03)
SPECIMEN SOURCE: NORMAL
UROBILINOGEN UR STRIP-ACNC: 0.2 EU/DL (ref 0.2–1)
WBC #/AREA URNS AUTO: NORMAL /HPF
WET PREP SPEC: NORMAL

## 2019-06-12 PROCEDURE — 87210 SMEAR WET MOUNT SALINE/INK: CPT | Performed by: INTERNAL MEDICINE

## 2019-06-12 PROCEDURE — 81001 URINALYSIS AUTO W/SCOPE: CPT | Performed by: INTERNAL MEDICINE

## 2019-06-12 PROCEDURE — 99214 OFFICE O/P EST MOD 30 MIN: CPT | Performed by: INTERNAL MEDICINE

## 2019-06-12 RX ORDER — MONTELUKAST SODIUM 10 MG/1
10 TABLET ORAL AT BEDTIME
Qty: 90 TABLET | Refills: 1 | Status: SHIPPED | OUTPATIENT
Start: 2019-06-12 | End: 2021-04-05

## 2019-06-12 RX ORDER — LOSARTAN POTASSIUM 50 MG/1
50 TABLET ORAL DAILY
Qty: 30 TABLET | Refills: 1 | Status: SHIPPED | OUTPATIENT
Start: 2019-06-12 | End: 2019-09-09

## 2019-06-12 RX ORDER — FLUTICASONE PROPIONATE 220 UG/1
2 AEROSOL, METERED RESPIRATORY (INHALATION) 2 TIMES DAILY
Qty: 1 INHALER | Refills: 3 | Status: SHIPPED | OUTPATIENT
Start: 2019-06-12 | End: 2023-01-06

## 2019-06-12 ASSESSMENT — PATIENT HEALTH QUESTIONNAIRE - PHQ9: SUM OF ALL RESPONSES TO PHQ QUESTIONS 1-9: 15

## 2019-06-12 NOTE — PROGRESS NOTES
Subjective     Felisha Gorman is a 59 year old female who presents to clinic today for the following health issues:    HPI   URINARY TRACT SYMPTOMS  Onset: 2 weeks     Description:   Painful urination (Dysuria): no   Blood in urine (Hematuria): no   Delay in urine (Hesitency): no     Intensity: mild    Progression of Symptoms:  constant    Accompanying Signs & Symptoms:  Fever/chills: no   Flank pain no   Nausea and vomiting: no   Any vaginal symptoms: vaginal discharge  Abdominal/Pelvic Pain: YES    History:   History of frequent UTI's: no   History of kidney stones: no   Sexually Active: YES  Possibility of pregnancy: No    Precipitating factors:   malodorus urine     Therapies Tried and outcome: na     Has been having a lot of urinary urgency for the past 2 weeks. Everything in her vaginal regions feels sensitive and her urine smells strong.   She does not have any abnormal discharge.     Also complains of a cough. She had a cold about 3 months ago. At night she starts wheezing a lot and coughing up mucus. She feels that it is waking her up at night. She wakes up very tired in the morning.       Patient Active Problem List   Diagnosis     Allergic state     Dysthymic disorder     Mild intermittent asthma     Hypertension goal BP (blood pressure) < 140/90     Obstructive sleep apnea     Anxiety state     IMPAIRED FASTING GLUCOSE     Major depression in complete remission (H)     CARDIOVASCULAR SCREENING; LDL GOAL LESS THAN 160     Family history of ovarian cancer     Knee pain     Osteoarthritis, knee     Abnormal finding on MRI of brain     Radicular low back pain     Recurrent major depressive disorder, in partial remission (H)     Non morbid obesity, unspecified obesity type     Past Surgical History:   Procedure Laterality Date     C NONSPECIFIC PROCEDURE  1/02    nasal polyp removed     COLONOSCOPY  7/09    hyperplastic polyp; repeat 10 years     SURGICAL HISTORY OF -   1/06    endometrial ablation      SURGICAL HISTORY OF -   15    bilateral breast reduction and tummy tuck     TONSILLECTOMY         Social History     Tobacco Use     Smoking status: Former Smoker     Packs/day: 2.00     Years: 10.00     Pack years: 20.00     Types: Cigarettes     Last attempt to quit: 1986     Years since quittin.4     Smokeless tobacco: Never Used     Tobacco comment: many years ago   Substance Use Topics     Alcohol use: No     Alcohol/week: 0.0 - 0.5 oz     Family History   Problem Relation Age of Onset     C.AAIMEE Brother         age 50     Heart Disease Brother      CRadhaAAIMEE Mother      Heart Disease Mother      Cancer Mother         ovarian OK now     Cancer - colorectal Mother         cancerous polyp     C.AAIMEE Father         MI     Heart Disease Father              Reviewed and updated as needed this visit by Provider         Review of Systems   ROS COMP: Constitutional, HEENT, cardiovascular, pulmonary, gi and gu systems are negative, except as otherwise noted.      Objective    /80 (BP Location: Left arm, Patient Position: Chair, Cuff Size: Adult Large)   Pulse 81   Temp 98.6  F (37  C) (Tympanic)   Wt 103.4 kg (228 lb)   LMP 2006   SpO2 97%   BMI 33.67 kg/m    Body mass index is 33.67 kg/m .  Physical Exam   GENERAL: healthy, alert and no distress  RESP: lungs clear to auscultation - no rales, rhonchi or wheezes  CV: regular rate and rhythm, normal S1 S2, no S3 or S4, no murmur, click or rub, no peripheral edema and peripheral pulses strong  BACK: no CVA tenderness, no paralumbar tenderness  PSYCH: mentation appears normal, affect normal/bright    Diagnostic Test Results:  Labs reviewed in Epic  Results for orders placed or performed in visit on 19   *UA reflex to Microscopic and Culture ([facility] (except [place] and [place])   Result Value Ref Range    Color Urine Yellow     Appearance Urine Clear     Glucose Urine Negative NEG^Negative mg/dL    Bilirubin Urine  "Negative NEG^Negative    Ketones Urine Negative NEG^Negative mg/dL    Specific Gravity Urine 1.020 1.003 - 1.035    Blood Urine Negative NEG^Negative    pH Urine 5.5 5.0 - 7.0 pH    Protein Albumin Urine Negative NEG^Negative mg/dL    Urobilinogen Urine 0.2 0.2 - 1.0 EU/dL    Nitrite Urine Negative NEG^Negative    Leukocyte Esterase Urine Trace (A) NEG^Negative    Source Midstream Urine    Urine Microscopic   Result Value Ref Range    WBC Urine 0 - 5 OTO5^0 - 5 /HPF    RBC Urine O - 2 OTO2^O - 2 /HPF    Squamous Epithelial /LPF Urine Few FEW^Few /LPF   Wet prep   Result Value Ref Range    Specimen Description Vagina     Wet Prep No Trichomonas seen     Wet Prep No clue cells seen     Wet Prep No yeast seen     Wet Prep WBC'S seen  Few              Assessment & Plan     1. Dysuria  UA is negative. Recommending increasing fluid intake and if still not improvement in symptoms I would likely treat empirically for bacterial vaginosis since her main symptom seems to be vaginal itching and burning.   - *UA reflex to Microscopic and Culture ([facility] (except [place] and [place])  - Urine Microscopic    2. Vaginal itching  Negative. See #1.  - Wet prep    3. Mild intermittent asthma with acute exacerbation  - montelukast (SINGULAIR) 10 MG tablet; Take 1 tablet (10 mg) by mouth At Bedtime  Dispense: 90 tablet; Refill: 1  - fluticasone (FLOVENT HFA) 220 MCG/ACT inhaler; Inhale 2 puffs into the lungs 2 times daily 2 puffs bid and rinse and spit after use  Dispense: 1 Inhaler; Refill: 3    4. Persistent cough  - montelukast (SINGULAIR) 10 MG tablet; Take 1 tablet (10 mg) by mouth At Bedtime  Dispense: 90 tablet; Refill: 1    5. Hypertension goal BP (blood pressure) < 140/90  - losartan (COZAAR) 50 MG tablet; Take 1 tablet (50 mg) by mouth daily  Dispense: 30 tablet; Refill: 1     BMI:   Estimated body mass index is 33.67 kg/m  as calculated from the following:    Height as of 3/27/19: 1.753 m (5' 9\").    " Weight as of this encounter: 103.4 kg (228 lb).       Return in about 2 weeks (around 6/26/2019) for Blood Pressure Check - NURSE ONLY.    Amrita Pardo MD  [facility] [place]

## 2019-06-13 NOTE — RESULT ENCOUNTER NOTE
Results discussed directly with patient while patient was present. Any further details documented in the note.    Amrita Pardo MD

## 2019-09-09 DIAGNOSIS — I10 HYPERTENSION GOAL BP (BLOOD PRESSURE) < 140/90: ICD-10-CM

## 2019-09-09 RX ORDER — LOSARTAN POTASSIUM 50 MG/1
TABLET ORAL
Qty: 30 TABLET | Refills: 0 | Status: SHIPPED | OUTPATIENT
Start: 2019-09-09 | End: 2021-04-05

## 2019-09-09 NOTE — LETTER
September 12, 2019      Felisha Gorman  [address] [address] [id number]        Dear Felisha,    I care about your health and have reviewed your health plan. I have reviewed your medical conditions, medication list, and lab results and am making recommendations based on this review, to better manage your health.    You are in particular need of attention regarding:  -High Blood Pressure    I am recommending that you:  -Schedule an appointment    Here is a list of Health Maintenance topics that are due now or due soon:  Health Maintenance Due   Topic Date Due     Hepatitis C Screening  1960     HIV Screening  01/08/1973     Zoster (Shingles) Vaccine (1 of 2) 01/08/2008     Preventive Care Visit  03/25/2015     PAP Smear  04/20/2015     Flu Vaccine (1) 09/01/2019     Colonscopy  07/27/2019     Asthma Control Test  09/27/2019       Please call us at [phone] (or use [employer]) to address the above recommendations.     Thank you for trusting [facility] and we appreciate the opportunity to serve you.  We look forward to supporting your healthcare needs in the future.    Healthy Regards,    [facility]

## 2019-09-09 NOTE — TELEPHONE ENCOUNTER
Medication is being filled for a one time refill only as patient is due for BP check after starting losartan at LOV.  30 day refill given.  Please call and assist with scheduling appointment prior to next refill     Lorraine LUCERO RN   [facility] [place]

## 2019-09-09 NOTE — TELEPHONE ENCOUNTER
"Requested Prescriptions   Pending Prescriptions Disp Refills     losartan (COZAAR) 50 MG tablet [Pharmacy Med Name: LOSARTAN 50MG TABLETS] 30 tablet 0     Sig: TAKE 1 TABLET(50 MG) BY MOUTH DAILY  Last Written Prescription Date:  6/12/19  Last Fill Quantity: 30,  # refills: 1   Last office visit: 6/12/2019 with prescribing provider:  Char   Future Office Visit:           Angiotensin-II Receptors Failed - 9/9/2019  3:15 AM        Failed - Last blood pressure under 140/90 in past 12 months     BP Readings from Last 3 Encounters:   06/12/19 150/80   03/27/19 150/72   04/12/18 120/70                 Failed - Normal serum creatinine on file in past 12 months     Recent Labs   Lab Test 04/12/18  0853   CR 0.73             Failed - Normal serum potassium on file in past 12 months     Recent Labs   Lab Test 04/12/18  0853   POTASSIUM 3.9                    Passed - Recent (12 mo) or future (30 days) visit within the authorizing provider's specialty     Patient had office visit in the last 12 months or has a visit in the next 30 days with authorizing provider or within the authorizing provider's specialty.  See \"Patient Info\" tab in inbasket, or \"Choose Columns\" in Meds & Orders section of the refill encounter.              Passed - Medication is active on med list        Passed - Patient is age 18 or older        Passed - No active pregnancy on record        Passed - No positive pregnancy test in past 12 months          "

## 2019-09-09 NOTE — TELEPHONE ENCOUNTER
Haydee Pierre contacted Felisha on 09/09/19 and left a message. If patient calls back please schedule appointment as soon as possible for a BP check.        .Haydee WATERS    [facility] [place]

## 2019-09-12 NOTE — TELEPHONE ENCOUNTER
Haydee Pierre contacted Felisha on 09/12/19 and left a message. If patient calls back please schedule appointment as soon as possible for a BP check. Letter sent.           .Haydee WATERS    [facility] [place]

## 2019-09-23 ENCOUNTER — APPOINTMENT (OUTPATIENT)
Age: 59
Setting detail: DERMATOLOGY
End: 2019-09-23

## 2019-09-23 VITALS — WEIGHT: 210 LBS | RESPIRATION RATE: 14 BRPM | HEIGHT: 69 IN

## 2019-09-23 DIAGNOSIS — L82.1 OTHER SEBORRHEIC KERATOSIS: ICD-10-CM

## 2019-09-23 PROCEDURE — OTHER COUNSELING: OTHER

## 2019-09-23 PROCEDURE — OTHER LIQUID NITROGEN: OTHER

## 2019-09-23 PROCEDURE — 99213 OFFICE O/P EST LOW 20 MIN: CPT | Mod: 25

## 2019-09-23 PROCEDURE — 17110 DESTRUCT B9 LESION 1-14: CPT

## 2019-09-23 ASSESSMENT — LOCATION DETAILED DESCRIPTION DERM: LOCATION DETAILED: LEFT CENTRAL LATERAL NECK

## 2019-09-23 ASSESSMENT — LOCATION ZONE DERM: LOCATION ZONE: NECK

## 2019-09-23 ASSESSMENT — LOCATION SIMPLE DESCRIPTION DERM: LOCATION SIMPLE: NECK

## 2019-09-23 NOTE — PROCEDURE: LIQUID NITROGEN
Medical Necessity Information: It is in your best interest to select a reason for this procedure from the list below. All of these items fulfill various CMS LCD requirements except the new and changing color options.
Post-Care Instructions: I reviewed with the patient in detail post-care instructions. Patient is to wear sunprotection, and avoid picking at any of the treated lesions. Pt may apply Aquaphor or Vaseline to crusted or scabbing areas. Areas treated may blister, if skin open, cover with bandaid.
Add 52 Modifier (Optional): no
Number Of Freeze-Thaw Cycles: 2 freeze-thaw cycles
Medical Necessity Clause: This procedure was medically necessary because the lesions that were treated were:
Consent: The patient's consent was obtained including but not limited to risks of crusting, scabbing, blistering, scarring, darker or lighter pigmentary change, recurrence, incomplete removal and infection.
Duration Of Freeze Thaw-Cycle (Seconds): 3
Render Note In Bullet Format When Appropriate: Yes
Detail Level: Detailed

## 2019-12-03 ENCOUNTER — TELEPHONE (OUTPATIENT)
Dept: FAMILY MEDICINE | Facility: CLINIC | Age: 59
End: 2019-12-03

## 2019-12-03 NOTE — TELEPHONE ENCOUNTER
Pt is due now to update PHQ9.  rosaliehart message sent to pt. Follow up end date 2/10/20.   PHQ-9 SCORE 9/11/2018 3/27/2019 6/12/2019   PHQ-9 Total Score - - -   PHQ-9 Total Score MyChart 11 (Moderate depression) - -   PHQ-9 Total Score 11 9 15     Joshua MANRIQUEZ, CMA

## 2019-12-10 ASSESSMENT — ANXIETY QUESTIONNAIRES
3. WORRYING TOO MUCH ABOUT DIFFERENT THINGS: MORE THAN HALF THE DAYS
6. BECOMING EASILY ANNOYED OR IRRITABLE: SEVERAL DAYS
2. NOT BEING ABLE TO STOP OR CONTROL WORRYING: MORE THAN HALF THE DAYS
5. BEING SO RESTLESS THAT IT IS HARD TO SIT STILL: NOT AT ALL
7. FEELING AFRAID AS IF SOMETHING AWFUL MIGHT HAPPEN: NOT AT ALL
GAD7 TOTAL SCORE: 9
1. FEELING NERVOUS, ANXIOUS, OR ON EDGE: NEARLY EVERY DAY
IF YOU CHECKED OFF ANY PROBLEMS ON THIS QUESTIONNAIRE, HOW DIFFICULT HAVE THESE PROBLEMS MADE IT FOR YOU TO DO YOUR WORK, TAKE CARE OF THINGS AT HOME, OR GET ALONG WITH OTHER PEOPLE: SOMEWHAT DIFFICULT

## 2019-12-10 ASSESSMENT — PATIENT HEALTH QUESTIONNAIRE - PHQ9
5. POOR APPETITE OR OVEREATING: SEVERAL DAYS
SUM OF ALL RESPONSES TO PHQ QUESTIONS 1-9: 9

## 2019-12-10 NOTE — TELEPHONE ENCOUNTER
"PHQ-9 SCORE 3/27/2019 6/12/2019 12/10/2019   PHQ-9 Total Score - - -   PHQ-9 Total Score Masoudt - - -   PHQ-9 Total Score 9 15 9     Routing to Provider as MONAE. Patient is not currently on medication to help manage her symptoms. Patient was on Prozac in the past, but she felt that she was not \"sharp\" on the medication that she was on in the past. Patient was on this medication for years and stopped this medication a couple years ago. Patient also mentions that she has been very anxious more than normal. Went through screening for anxiety which is below.    BOBBY-7 SCORE 9/11/2018 3/27/2019 12/10/2019   Total Score - - -   Total Score 4 (minimal anxiety) - -   Total Score 4 5 9     Patient states that she works so much that it is hard to go in for an office visit and would prefer a telephone visit if able. Patient states that she used to go to the [facility], but would prefer coming to the [facility] clinic and saw Dr. Pardo last in June. RN asked patient if there is a certain time of the day and also day of the week that works best and patient said she can work around whatever day Dr. Pardo offers.     Dr. Pardo are you okay with a telephone visit with patient or do you encourage patient to be evaluated in person. If you are okay with telephone visit can you please give me some days that would work best for you and times?      Okay to leave a detailed message? [phone]     Elena Palacio RN, BSN  [facility] [place]    "

## 2019-12-10 NOTE — TELEPHONE ENCOUNTER
Left message for pt to call back and speak with a TC. Ok to double book as a tel visit in a morning slot per the note below.  Mickie Calvert,

## 2019-12-10 NOTE — TELEPHONE ENCOUNTER
Pt has not responded to [employer] message, please call pt and update phq 9.Joshua MANRIQUEZ, CMA

## 2019-12-10 NOTE — TELEPHONE ENCOUNTER
Routing to  to call patient and help assist with scheduling a telephone visit, please see Dr. Pardo's response below. Thank you.     Elena Palacio RN, BSN  [facility]

## 2019-12-10 NOTE — TELEPHONE ENCOUNTER
Yes I'm perfectly comfortable with a telephone visit for this. You can put her in any time tomorrow morning. My medical assistant will call her to initiate the visit and I will then call her within the hour.

## 2019-12-11 ASSESSMENT — ANXIETY QUESTIONNAIRES: GAD7 TOTAL SCORE: 9

## 2020-01-13 ENCOUNTER — TELEPHONE (OUTPATIENT)
Dept: FAMILY MEDICINE | Facility: CLINIC | Age: 60
End: 2020-01-13

## 2020-01-13 ENCOUNTER — OFFICE VISIT (OUTPATIENT)
Dept: FAMILY MEDICINE | Facility: CLINIC | Age: 60
End: 2020-01-13
Payer: COMMERCIAL

## 2020-01-13 VITALS
TEMPERATURE: 98.1 F | OXYGEN SATURATION: 97 % | HEART RATE: 78 BPM | DIASTOLIC BLOOD PRESSURE: 60 MMHG | SYSTOLIC BLOOD PRESSURE: 120 MMHG | WEIGHT: 220 LBS | BODY MASS INDEX: 32.49 KG/M2

## 2020-01-13 DIAGNOSIS — Z86.79 HISTORY OF RHEUMATIC FEVER AS A CHILD: ICD-10-CM

## 2020-01-13 DIAGNOSIS — R00.2 HEART PALPITATIONS: Primary | ICD-10-CM

## 2020-01-13 DIAGNOSIS — F41.1 ANXIETY STATE: ICD-10-CM

## 2020-01-13 DIAGNOSIS — D72.829 LEUKOCYTOSIS, UNSPECIFIED TYPE: ICD-10-CM

## 2020-01-13 LAB
ERYTHROCYTE [DISTWIDTH] IN BLOOD BY AUTOMATED COUNT: 14.1 % (ref 10–15)
HCT VFR BLD AUTO: 46 % (ref 35–47)
HGB BLD-MCNC: 15.2 G/DL (ref 11.7–15.7)
MCH RBC QN AUTO: 28.8 PG (ref 26.5–33)
MCHC RBC AUTO-ENTMCNC: 33 G/DL (ref 31.5–36.5)
MCV RBC AUTO: 87 FL (ref 78–100)
PLATELET # BLD AUTO: 429 10E9/L (ref 150–450)
RBC # BLD AUTO: 5.27 10E12/L (ref 3.8–5.2)
WBC # BLD AUTO: 13.1 10E9/L (ref 4–11)

## 2020-01-13 PROCEDURE — 99214 OFFICE O/P EST MOD 30 MIN: CPT | Performed by: NURSE PRACTITIONER

## 2020-01-13 PROCEDURE — 36415 COLL VENOUS BLD VENIPUNCTURE: CPT | Performed by: NURSE PRACTITIONER

## 2020-01-13 PROCEDURE — 84443 ASSAY THYROID STIM HORMONE: CPT | Performed by: NURSE PRACTITIONER

## 2020-01-13 PROCEDURE — 93000 ELECTROCARDIOGRAM COMPLETE: CPT | Performed by: NURSE PRACTITIONER

## 2020-01-13 PROCEDURE — 85027 COMPLETE CBC AUTOMATED: CPT | Performed by: NURSE PRACTITIONER

## 2020-01-13 PROCEDURE — 80053 COMPREHEN METABOLIC PANEL: CPT | Performed by: NURSE PRACTITIONER

## 2020-01-13 PROCEDURE — 83735 ASSAY OF MAGNESIUM: CPT | Performed by: NURSE PRACTITIONER

## 2020-01-13 RX ORDER — ALPRAZOLAM 0.5 MG
0.5 TABLET ORAL 3 TIMES DAILY PRN
Qty: 20 TABLET | Refills: 0 | Status: SHIPPED | OUTPATIENT
Start: 2020-01-13

## 2020-01-13 NOTE — PROGRESS NOTES
"Subjective     Felisha Gorman is a 60 year old female who presents to clinic today for the following health issues:      Concern - heart palpitations   Onset: early December     Description:   Heavy, sometimes very hard heart pounding, sometimes very little.     Intensity: moderate    Progression of Symptoms:  intermittent    Accompanying Signs & Symptoms:  Father passed, notes other stressful situations      Previous history of similar problem:   Yes, years ago but went away on their own and notes it was not as intense     Precipitating factors:   Worsened by: stress    Alleviating factors:  Improved by: na     Therapies Tried and outcome: relaxation     HPI: Felisha presents today with the complaint of heart palpitations. She has been struggling with this since early December.     She has been under a lot of stress lately, which she feels may be contributing: High stress job, Dad was ill and passed away, injured her knee, found out that her ex- hadn't been paying taxes.     Family history of heart disease:  Mom - Atrial fibrillation  Dad - Heart attack  Brother - Heart attack in late 50s    Personal history:  Had rheumatic fever at age 4.   Was worked up for this over ten years ago without notable etiology discovered. Seemed to fade over time (no issues since then).     No chest pain / pressure, diaphoresis, nausea, dizziness / unsteadiness / feeling faint.     Started in early December. Was worse a couple weeks back. Has been staying stable.     These episodes occur everyday (some days are better, some are worse). On average, she has this for about an hour per day. Feels \"lumpy\" and \"fluttery\" in her chest when these occur.     Today, she experienced this during a meeting, which tells her that it may be driven, at least in part, by stress.     Drinks several Diet Cokes per day (3 large [employer] cups - minimum 4 cans).  No smoking.   No energy drinks.   Not using albuterol much.   No coffee (see above " regarding Diet Coke)  No appreciable alcohol    She feels like this may be related to stress / anxiety.     No known thyroid disease or symptoms of such.     Patient Active Problem List   Diagnosis     Allergic state     Dysthymic disorder     Mild intermittent asthma     Hypertension goal BP (blood pressure) < 140/90     Obstructive sleep apnea     Anxiety state     IMPAIRED FASTING GLUCOSE     Major depression in complete remission (H)     CARDIOVASCULAR SCREENING; LDL GOAL LESS THAN 160     Family history of ovarian cancer     Knee pain     Osteoarthritis, knee     Abnormal finding on MRI of brain     Radicular low back pain     Recurrent major depressive disorder, in partial remission (H)     Non morbid obesity, unspecified obesity type     Past Surgical History:   Procedure Laterality Date     C NONSPECIFIC PROCEDURE      nasal polyp removed     COLONOSCOPY      hyperplastic polyp; repeat 10 years     SURGICAL HISTORY OF -       endometrial ablation     SURGICAL HISTORY OF -   15    bilateral breast reduction and tummy tuck     TONSILLECTOMY         Social History     Tobacco Use     Smoking status: Former Smoker     Packs/day: 2.00     Years: 10.00     Pack years: 20.00     Types: Cigarettes     Last attempt to quit: 1986     Years since quittin.0     Smokeless tobacco: Never Used     Tobacco comment: many years ago   Substance Use Topics     Alcohol use: No     Alcohol/week: 0.0 - 0.8 standard drinks     Family History   Problem Relation Age of Onset     C.A.D. Brother         age 50     Heart Disease Brother      C.A.D. Mother      Heart Disease Mother      Cancer Mother         ovarian OK now     Cancer - colorectal Mother         cancerous polyp     C.A.D. Father         MI     Heart Disease Father            Reviewed and updated as needed this visit by Provider  Tobacco  Allergies  Meds  Problems  Med Hx  Surg Hx  Fam Hx         Review of Systems   ROS COMP:  Constitutional, cardiovascular, pulmonary, GI, neuro, endocrine and psych systems are negative, except as otherwise noted.      Objective    /60 (BP Location: Right arm, Patient Position: Chair, Cuff Size: Adult Large)   Pulse 78   Temp 98.1  F (36.7  C) (Tympanic)   Wt 99.8 kg (220 lb)   LMP 09/29/2006   SpO2 97%   BMI 32.49 kg/m    Body mass index is 32.49 kg/m .  Physical Exam   GENERAL: healthy, alert and no distress  HENT: ear canals and TM's normal, nose and mouth without ulcers or lesions  NECK: no adenopathy, no asymmetry, masses, or scars and thyroid normal to palpation  RESP: lungs clear to auscultation - no rales, rhonchi or wheezes  CV: regular rate and rhythm, normal S1 S2, no S3 or S4, no murmur, click or rub, no peripheral edema and peripheral pulses strong; Occasional pause / extrasystole palpable over radial artery.  NEURO: Normal strength and tone, mentation intact and speech normal  PSYCH: mentation appears normal, affect normal/bright    Diagnostic Test Results:  Results for orders placed or performed in visit on 01/13/20 (from the past 24 hour(s))   CBC with platelets   Result Value Ref Range    WBC 13.1 (H) 4.0 - 11.0 10e9/L    RBC Count 5.27 (H) 3.8 - 5.2 10e12/L    Hemoglobin 15.2 11.7 - 15.7 g/dL    Hematocrit 46.0 35.0 - 47.0 %    MCV 87 78 - 100 fl    MCH 28.8 26.5 - 33.0 pg    MCHC 33.0 31.5 - 36.5 g/dL    RDW 14.1 10.0 - 15.0 %    Platelet Count 429 150 - 450 10e9/L     EKG - Sinus rhythm with left-axis anterior fascicular block.         Assessment & Plan     Felisha was seen today for heart problem. Palpitations of unknown etiology. Anemia not evident on CBC. TSH, CMP, and magnesium pending. Etiology could be related to structural heart disease (rheumatic fever at age 4), primary arrhythmia (left anterior fascicular block on ECG today), or driven by anxiety / situational stress and high caffeine consumption (usually three 20-ounce Diet Cokes daily). I have asked her to reduce  "caffeine consumption, and I have ordered a short Rx for low-dose alprazolam to see if anxiolysis contributes to improvement of these symptoms. I have also ordered an echocardiogram given past history of rheumatic fever, fascicular block on ECG, and strong family history of heart disease. Finally, I have ordered a Zio Patch study for 3 days (she states that she wasn't feeling the \"fluttering\" during the ECG tracings taken today. Ideally, we will get an idea of what the underlying rhythm issue is (ie PAC, PVCs, SVT, paroxysmal a-fib, etc.). In the meantime, we discussed the fact that she should report to the ER if she develops signs or symptoms that could be reflective of ischemia and/or hemodynamic instability. She agrees to do so. Discussed risks, benefits, alternatives, potential side effects, and proper administration of new medication / treatment. Agrees with plan of care. All questions answered.     Diagnoses and all orders for this visit:    Heart palpitations  -     EKG 12-lead complete w/read - Clinics  -     Zio Patch Holter Adult Pediatric Greater than 48 hrs; Future  -     Comprehensive metabolic panel  -     Magnesium  -     CBC with platelets  -     TSH with free T4 reflex  -     Echocardiogram Complete; Future  -     ALPRAZolam (XANAX) 0.5 MG tablet; Take 1 tablet (0.5 mg) by mouth 3 times daily as needed for anxiety    History of rheumatic fever as a child  -     Echocardiogram Complete; Future    Anxiety state  -     ALPRAZolam (XANAX) 0.5 MG tablet; Take 1 tablet (0.5 mg) by mouth 3 times daily as needed for anxiety         BMI:   Estimated body mass index is 32.49 kg/m  as calculated from the following:    Height as of 3/27/19: 1.753 m (5' 9\").    Weight as of this encounter: 99.8 kg (220 lb).   Weight management plan: Discussed healthy diet and exercise guidelines        See Patient Instructions    Return in about 1 week (around 1/20/2020) for persistent or worsening symptoms.    Chad Canela, " NP  [facility]

## 2020-01-13 NOTE — TELEPHONE ENCOUNTER
Reason for call:  Patient reporting a symptom    Symptom or request: Heart palpitations     Duration (how long have symptoms been present): Several weeks     Have you been treated for this before? No    Additional comments: OV scheduled for 540 with ML     Phone Number patient can be reached at:  Home number on file [phone] (home)    Best Time:  anytime    Can we leave a detailed message on this number:  YES    Call taken on 1/13/2020 at 12:03 PM by Angeline Saucedo

## 2020-01-13 NOTE — TELEPHONE ENCOUNTER
Reason for Call:  Other returning call    Detailed comments: Patient called back to speak with a nurse. No RN available. Please call her back.    Phone Number Patient can be reached at: Cell number on file:    Telephone Information:   Mobile [phone]     Best Time: Anytime    Can we leave a detailed message on this number? YES    Call taken on 1/13/2020 at 12:30 PM by Tracie Longoria

## 2020-01-13 NOTE — TELEPHONE ENCOUNTER
Left a non-detailed message and call back number [phone].     Elena Palacio RN, BSN  [facility] [place]

## 2020-01-13 NOTE — TELEPHONE ENCOUNTER
Attempted to call patient x 2. Phone did not ring and stated the mailbox was full.     Julia Vilchis RN   [facility] - Triage

## 2020-01-14 ENCOUNTER — OFFICE VISIT (OUTPATIENT)
Dept: CARDIOLOGY | Facility: CLINIC | Age: 60
End: 2020-01-14
Payer: COMMERCIAL

## 2020-01-14 VITALS
HEART RATE: 75 BPM | SYSTOLIC BLOOD PRESSURE: 144 MMHG | HEIGHT: 69 IN | BODY MASS INDEX: 32.58 KG/M2 | DIASTOLIC BLOOD PRESSURE: 85 MMHG | WEIGHT: 220 LBS

## 2020-01-14 DIAGNOSIS — I47.0 RE-ENTRY VENTRICULAR ARRHYTHMIA (H): ICD-10-CM

## 2020-01-14 DIAGNOSIS — R00.2 PALPITATIONS: Primary | ICD-10-CM

## 2020-01-14 LAB
ALBUMIN SERPL-MCNC: 4 G/DL (ref 3.4–5)
ALP SERPL-CCNC: 106 U/L (ref 40–150)
ALT SERPL W P-5'-P-CCNC: 45 U/L (ref 0–50)
ANION GAP SERPL CALCULATED.3IONS-SCNC: 9 MMOL/L (ref 3–14)
AST SERPL W P-5'-P-CCNC: 15 U/L (ref 0–45)
BILIRUB SERPL-MCNC: 0.4 MG/DL (ref 0.2–1.3)
BUN SERPL-MCNC: 21 MG/DL (ref 7–30)
CALCIUM SERPL-MCNC: 9.3 MG/DL (ref 8.5–10.1)
CHLORIDE SERPL-SCNC: 104 MMOL/L (ref 94–109)
CO2 SERPL-SCNC: 25 MMOL/L (ref 20–32)
CREAT SERPL-MCNC: 0.74 MG/DL (ref 0.52–1.04)
GFR SERPL CREATININE-BSD FRML MDRD: 87 ML/MIN/{1.73_M2}
GLUCOSE SERPL-MCNC: 85 MG/DL (ref 70–99)
MAGNESIUM SERPL-MCNC: 2.3 MG/DL (ref 1.6–2.3)
POTASSIUM SERPL-SCNC: 4.4 MMOL/L (ref 3.4–5.3)
PROT SERPL-MCNC: 7.8 G/DL (ref 6.8–8.8)
SODIUM SERPL-SCNC: 138 MMOL/L (ref 133–144)
TSH SERPL DL<=0.005 MIU/L-ACNC: 1.78 MU/L (ref 0.4–4)

## 2020-01-14 PROCEDURE — 93000 ELECTROCARDIOGRAM COMPLETE: CPT | Performed by: INTERNAL MEDICINE

## 2020-01-14 PROCEDURE — 99205 OFFICE O/P NEW HI 60 MIN: CPT | Performed by: INTERNAL MEDICINE

## 2020-01-14 RX ORDER — METOPROLOL SUCCINATE 50 MG/1
50 TABLET, EXTENDED RELEASE ORAL DAILY
Qty: 30 TABLET | Refills: 11 | Status: SHIPPED | OUTPATIENT
Start: 2020-01-14 | End: 2021-01-01

## 2020-01-14 ASSESSMENT — MIFFLIN-ST. JEOR: SCORE: 1622.54

## 2020-01-14 NOTE — TELEPHONE ENCOUNTER
Patient was seen by SAL Canela yesterday at 5:40pm. Closing encounter at this time.     Elena Palacio RN, BSN  [facility] [place]

## 2020-01-14 NOTE — PROGRESS NOTES
Service Date: 01/14/2020      CARDIOLOGY OFFICE NOTE      HISTORY OF PRESENT ILLNESS:  I had the pleasure of seeing your patient, Felisha Gorman, at the [facility] [place].  She was referred in for palpitations.  She does not have any known heart history, although she tells me she was on a beta blocker before and it might have been for palpitations.  She has been under a lot of stress, emotional stress, work stress and per her admission, heavy amounts of caffeine and even a little bit of dark chocolate.  She identifies palpitations described as flip-flop beats, not racing.  The only dizziness she has is if she stands up quickly.  She has a history of hypertension for which she is on losartan.  Her blood pressure is high today but she freely admits that she misses many doses of losartan.  There has been no recent febrile illness.  She has no exercise intolerance, although she does not exercise regularly.  There is a family history of atrial fib in her mother and her brother who had coronary disease and a heart attack at a younger age.  This patient's cholesterol numbers were quite abnormal in 2018 and have not been addressed since that time.  A thyroid level was checked some time ago.  CBC was done yesterday.  The white count was up a little bit but otherwise the CBC was normal and the other lab tests that are available are really relatively unremarkable.  EKG shows a left anterior fascicular block with sinus rhythm.      On my exam, I did hear indeed her palpitations.  I put her on a rhythm strip and she has unifocal PVCs, occasionally in a bigeminal pattern.  The PVCs have a positive axis in II, III and F and a left bundle branch pattern suggesting that they are likely coming from the right ventricular outflow tract.  This is a common location for palpitations.  At this time, since her blood pressure is still running high on the losartan and fully understanding that she states she is not taking it regularly, I  first asked her to decrease her caffeine markedly.  I am going to start her on metoprolol XL 50 mg a day, both for its blood pressure effects and for what might be catecholamine-induced or RV outflow tract PVCs.  We will have her come back in about a week or so, check her blood pressure and she will tell us subjectively if the PVCs are less prominent.  We will get an echocardiogram, as I mentioned.  At a future date, we may want to do a stress test since she has risk factors including family history of hypertension, hyperlipidemia.  After we get the blood pressure and palpitations controlled, I would strongly encourage her to go on a cholesterol pill such as Crestor.  Her hemoglobin A1c was checked in the past.  It was normal which is reassuring.  If the palpitations do not go away, we could consider sending her to an EP consultant on occasion.  What I am assuming is an RV outflow tract location can be ablated.  I did explain in depth today how caffeine can trigger some of these as can exercise since sometimes these are indeed catecholamine-induced.  We talked a bit about diet control for the cholesterol, need for stress test in the future.  We touched on cholesterol, weight and diet.      Thank you for allowing me to see Ms. Jorgensen.  She will return in about a week after her electrolyte panel, her trial of metoprolol 50 mg a day and her echo.        Today's visit was 1 hour, greater than 50% counseling.      Daniel Huggins MD       cc:   [place] [address]         DANIEL HUGGINS MD             D: 2020   T: 2020   MT: SILVIA      Name:     ISAAC JORGENSEN   MRN:      [id number]        Account:      [id number]   :      1960           Service Date: 2020      Document: [id number]

## 2020-01-14 NOTE — LETTER
1/14/2020    [place]  [address] [place] [address]    RE: Felisha oGrman       Dear Colleague,    I had the pleasure of seeing Felisha Gorman in the [facility].    HPI and Plan:   See dictation    Orders Placed This Encounter   Procedures     EKG 12-lead complete w/read - Clinics (performed today)     Orders Placed This Encounter   Medications     metoprolol succinate ER (TOPROL-XL) 50 MG 24 hr tablet     Sig: Take 1 tablet (50 mg) by mouth daily     Dispense:  30 tablet     Refill:  11     There are no discontinued medications.      Encounter Diagnosis   Name Primary?     Palpitations Yes       CURRENT MEDICATIONS:  Current Outpatient Medications   Medication Sig Dispense Refill     albuterol (VENTOLIN HFA) 108 (90 Base) MCG/ACT inhaler Inhale 2 puffs into the lungs every 6 hours 18 g 3     ALPRAZolam (XANAX) 0.5 MG tablet Take 1 tablet (0.5 mg) by mouth 3 times daily as needed for anxiety 20 tablet 0     FLUoxetine (PROZAC) 40 MG capsule TAKE ONE CAPSULE BY MOUTH EVERY DAY 90 capsule 0     fluticasone (FLONASE) 50 MCG/ACT nasal spray Spray 1-2 sprays into both nostrils daily 3 Package 3     fluticasone (FLOVENT HFA) 220 MCG/ACT inhaler Inhale 2 puffs into the lungs 2 times daily 2 puffs bid and rinse and spit after use 1 Inhaler 3     losartan (COZAAR) 50 MG tablet TAKE 1 TABLET(50 MG) BY MOUTH DAILY 30 tablet 0     metoprolol succinate ER (TOPROL-XL) 50 MG 24 hr tablet Take 1 tablet (50 mg) by mouth daily 30 tablet 11     montelukast (SINGULAIR) 10 MG tablet Take 1 tablet (10 mg) by mouth At Bedtime 90 tablet 1     acyclovir (ZOVIRAX) 400 MG tablet Take 1 tablet (400 mg) by mouth 3 times daily for 7 days 21 tablet 1       ALLERGIES     Allergies   Allergen Reactions     Penicillin [Penicillins] Rash       PAST MEDICAL HISTORY:  Past Medical History:   Diagnosis Date     Allergy, unspecified not elsewhere classified      Dysthymic disorder      Essential  hypertension, benign      flight anxiety      Frequent BRONCHITIS     usually in winter     HSV (herpes simplex virus) anogenital infection      Impaired fasting glucose 9/23/2007    Glucose 101 9/07     Left anterior hemiblock      Mild intermittent asthma     URI induced     Obstructive sleep apnea (adult) (pediatric)     not on CPAP; feels rested 2014     Other and unspecified hyperlipidemia      Palpitations     PVC and PVC bigmeny     Rheumatic fever without mention of heart involvement     no sequelae     transient neurologic sx 2005    ? TIA; had MRI with temporal lobe lesion that is getting smaller       PAST SURGICAL HISTORY:  Past Surgical History:   Procedure Laterality Date     C NONSPECIFIC PROCEDURE  1/02    nasal polyp removed     COLONOSCOPY  7/09    hyperplastic polyp; repeat 10 years     SURGICAL HISTORY OF -   1/06    endometrial ablation     SURGICAL HISTORY OF -   12/18/17    bilateral breast reduction and tummy tuck     TONSILLECTOMY         FAMILY HISTORY:  Family History   Problem Relation Age of Onset     C.A.JAZMINE Brother         age 50     Heart Disease Brother      C.A.JAZMINE Mother      Heart Disease Mother      Cancer Mother         ovarian OK now     Cancer - colorectal Mother         cancerous polyp     C.A.JAZMINE Father         MI     Heart Disease Father        SOCIAL HISTORY:  Social History     Socioeconomic History     Marital status: Legally      Spouse name: Dorian     Number of children: 1     Years of education: None     Highest education level: None   Occupational History     Occupation:       Employer: [employer]   Social Needs     Financial resource strain: None     Food insecurity:     Worry: None     Inability: None     Transportation needs:     Medical: None     Non-medical: None   Tobacco Use     Smoking status: Former Smoker     Packs/day: 2.00     Years: 10.00     Pack years: 20.00     Types: Cigarettes     Last attempt to quit:  "1986     Years since quittin.0     Smokeless tobacco: Never Used     Tobacco comment: many years ago   Substance and Sexual Activity     Alcohol use: No     Alcohol/week: 0.0 - 0.8 standard drinks     Drug use: No     Sexual activity: Yes     Partners: Male     Birth control/protection: Condom   Lifestyle     Physical activity:     Days per week: None     Minutes per session: None     Stress: None   Relationships     Social connections:     Talks on phone: None     Gets together: None     Attends Jain service: None     Active member of club or organization: None     Attends meetings of clubs or organizations: None     Relationship status: None     Intimate partner violence:     Fear of current or ex partner: None     Emotionally abused: None     Physically abused: None     Forced sexual activity: None   Other Topics Concern      Service Not Asked     Blood Transfusions Not Asked     Caffeine Concern Not Asked     Occupational Exposure Not Asked     Hobby Hazards Not Asked     Sleep Concern Not Asked     Stress Concern Not Asked     Weight Concern Not Asked     Special Diet No     Comment: stress eating recently     Back Care Not Asked     Exercise No     Bike Helmet Not Asked     Seat Belt Not Asked     Self-Exams Not Asked     Parent/sibling w/ CABG, MI or angioplasty before 65F 55M? Yes   Social History Narrative     None       Review of Systems:  Skin:  Negative     Eyes:  Positive for glasses  ENT:  Negative    Respiratory:  Negative for dyspnea on exertion  Cardiovascular:  Negative for;chest pain;lightheadedness Positive for;palpitations  Gastroenterology: Negative    Genitourinary:  Negative    Musculoskeletal:  Negative    Neurologic:  Negative    Psychiatric:  Positive for sleep disturbances  Heme/Lymph/Imm:  Negative    Endocrine:  Negative      Physical Exam:  Vitals: BP (!) 144/85   Pulse 75   Ht 1.753 m (5' 9.02\")   Wt 99.8 kg (220 lb)   LMP 2006   BMI 32.47 kg/m   "     Constitutional:  cooperative, alert and oriented, well developed, well nourished, in no acute distress        Skin:  warm and dry to the touch, no apparent skin lesions or masses noted          Head:  normocephalic, no masses or lesions        Eyes:  pupils equal and round, conjunctivae and lids unremarkable, sclera white, no xanthalasma, EOMS intact, no nystagmus        Lymph:No Cervical lymphadenopathy present     ENT:  no pallor or cyanosis, dentition good        Neck:  carotid pulses are full and equal bilaterally, JVP normal, no carotid bruit        Respiratory:  normal breath sounds, clear to auscultation, normal A-P diameter, normal symmetry, normal respiratory excursion, no use of accessory muscles         Cardiac: regular rhythm, normal S1/S2, no S3 or S4, apical impulse not displaced, no murmurs, gallops or rubs frequent premature beats           PVC on rhythm strip, some V bigemeny  pulses full and equal, no bruits auscultated                                        GI:  abdomen soft, non-tender, BS normoactive, no mass, no HSM, no bruits obese      Extremities and Muscular Skeletal:  no deformities, clubbing, cyanosis, erythema observed;no edema              Neurological:  no gross motor deficits        Psych:  Alert and Oriented x 3      Recent Lab Results:  LIPID RESULTS:  Lab Results   Component Value Date    CHOL 222 (H) 04/12/2018    HDL 35 (L) 04/12/2018     (H) 04/12/2018    TRIG 171 (H) 04/12/2018    CHOLHDLRATIO 5.5 (H) 03/25/2014       LIVER ENZYME RESULTS:  Lab Results   Component Value Date    AST 12 04/12/2018    ALT 28 04/12/2018       CBC RESULTS:  Lab Results   Component Value Date    WBC 13.1 (H) 01/13/2020    RBC 5.27 (H) 01/13/2020    HGB 15.2 01/13/2020    HCT 46.0 01/13/2020    MCV 87 01/13/2020    MCH 28.8 01/13/2020    MCHC 33.0 01/13/2020    RDW 14.1 01/13/2020     01/13/2020       BMP RESULTS:  Lab Results   Component Value Date     04/12/2018    POTASSIUM  3.9 04/12/2018    CHLORIDE 111 (H) 04/12/2018    CO2 26 04/12/2018    ANIONGAP 5 04/12/2018    GLC 91 04/12/2018    BUN 15 04/12/2018    CR 0.73 04/12/2018    GFRESTIMATED 82 04/12/2018    GFRESTBLACK >90 04/12/2018    JACK 8.5 04/12/2018        A1C RESULTS:  Lab Results   Component Value Date    A1C 5.8 04/12/2018       INR RESULTS:  Lab Results   Component Value Date    INR 1.09 06/16/2005           CC  No referring provider defined for this encounter.    Thank you for allowing me to participate in the care of your patient.      Sincerely,     Daniel Griffith MD     [facility]    cc:   No referring provider defined for this encounter.

## 2020-01-14 NOTE — PROGRESS NOTES
HPI and Plan:   See dictation    Orders Placed This Encounter   Procedures     EKG 12-lead complete w/read - Clinics (performed today)     Orders Placed This Encounter   Medications     metoprolol succinate ER (TOPROL-XL) 50 MG 24 hr tablet     Sig: Take 1 tablet (50 mg) by mouth daily     Dispense:  30 tablet     Refill:  11     There are no discontinued medications.      Encounter Diagnosis   Name Primary?     Palpitations Yes       CURRENT MEDICATIONS:  Current Outpatient Medications   Medication Sig Dispense Refill     albuterol (VENTOLIN HFA) 108 (90 Base) MCG/ACT inhaler Inhale 2 puffs into the lungs every 6 hours 18 g 3     ALPRAZolam (XANAX) 0.5 MG tablet Take 1 tablet (0.5 mg) by mouth 3 times daily as needed for anxiety 20 tablet 0     FLUoxetine (PROZAC) 40 MG capsule TAKE ONE CAPSULE BY MOUTH EVERY DAY 90 capsule 0     fluticasone (FLONASE) 50 MCG/ACT nasal spray Spray 1-2 sprays into both nostrils daily 3 Package 3     fluticasone (FLOVENT HFA) 220 MCG/ACT inhaler Inhale 2 puffs into the lungs 2 times daily 2 puffs bid and rinse and spit after use 1 Inhaler 3     losartan (COZAAR) 50 MG tablet TAKE 1 TABLET(50 MG) BY MOUTH DAILY 30 tablet 0     metoprolol succinate ER (TOPROL-XL) 50 MG 24 hr tablet Take 1 tablet (50 mg) by mouth daily 30 tablet 11     montelukast (SINGULAIR) 10 MG tablet Take 1 tablet (10 mg) by mouth At Bedtime 90 tablet 1     acyclovir (ZOVIRAX) 400 MG tablet Take 1 tablet (400 mg) by mouth 3 times daily for 7 days 21 tablet 1       ALLERGIES     Allergies   Allergen Reactions     Penicillin [Penicillins] Rash       PAST MEDICAL HISTORY:  Past Medical History:   Diagnosis Date     Allergy, unspecified not elsewhere classified      Dysthymic disorder      Essential hypertension, benign      flight anxiety      Frequent BRONCHITIS     usually in winter     HSV (herpes simplex virus) anogenital infection      Impaired fasting glucose 9/23/2007    Glucose 101 9/07     Left anterior  hemiblock      Mild intermittent asthma     URI induced     Obstructive sleep apnea (adult) (pediatric)     not on CPAP; feels rested      Other and unspecified hyperlipidemia      Palpitations     PVC and PVC bigmeny     Rheumatic fever without mention of heart involvement     no sequelae     transient neurologic sx     ? TIA; had MRI with temporal lobe lesion that is getting smaller       PAST SURGICAL HISTORY:  Past Surgical History:   Procedure Laterality Date     C NONSPECIFIC PROCEDURE      nasal polyp removed     COLONOSCOPY      hyperplastic polyp; repeat 10 years     SURGICAL HISTORY OF -       endometrial ablation     SURGICAL HISTORY OF -   15    bilateral breast reduction and tummy tuck     TONSILLECTOMY         FAMILY HISTORY:  Family History   Problem Relation Age of Onset     C.AAIMEE Brother         age 50     Heart Disease Brother      ERAAAIMEE Mother      Heart Disease Mother      Cancer Mother         ovarian OK now     Cancer - colorectal Mother         cancerous polyp     C.A.JAZMINE Father         MI     Heart Disease Father        SOCIAL HISTORY:  Social History     Socioeconomic History     Marital status: Legally      Spouse name: Dorian     Number of children: 1     Years of education: None     Highest education level: None   Occupational History     Occupation:       Employer: [employer]   Social Needs     Financial resource strain: None     Food insecurity:     Worry: None     Inability: None     Transportation needs:     Medical: None     Non-medical: None   Tobacco Use     Smoking status: Former Smoker     Packs/day: 2.00     Years: 10.00     Pack years: 20.00     Types: Cigarettes     Last attempt to quit: 1986     Years since quittin.0     Smokeless tobacco: Never Used     Tobacco comment: many years ago   Substance and Sexual Activity     Alcohol use: No     Alcohol/week: 0.0 - 0.8 standard drinks     Drug use: No  "    Sexual activity: Yes     Partners: Male     Birth control/protection: Condom   Lifestyle     Physical activity:     Days per week: None     Minutes per session: None     Stress: None   Relationships     Social connections:     Talks on phone: None     Gets together: None     Attends Anglican service: None     Active member of club or organization: None     Attends meetings of clubs or organizations: None     Relationship status: None     Intimate partner violence:     Fear of current or ex partner: None     Emotionally abused: None     Physically abused: None     Forced sexual activity: None   Other Topics Concern      Service Not Asked     Blood Transfusions Not Asked     Caffeine Concern Not Asked     Occupational Exposure Not Asked     Hobby Hazards Not Asked     Sleep Concern Not Asked     Stress Concern Not Asked     Weight Concern Not Asked     Special Diet No     Comment: stress eating recently     Back Care Not Asked     Exercise No     Bike Helmet Not Asked     Seat Belt Not Asked     Self-Exams Not Asked     Parent/sibling w/ CABG, MI or angioplasty before 65F 55M? Yes   Social History Narrative     None       Review of Systems:  Skin:  Negative     Eyes:  Positive for glasses  ENT:  Negative    Respiratory:  Negative for dyspnea on exertion  Cardiovascular:  Negative for;chest pain;lightheadedness Positive for;palpitations  Gastroenterology: Negative    Genitourinary:  Negative    Musculoskeletal:  Negative    Neurologic:  Negative    Psychiatric:  Positive for sleep disturbances  Heme/Lymph/Imm:  Negative    Endocrine:  Negative      Physical Exam:  Vitals: BP (!) 144/85   Pulse 75   Ht 1.753 m (5' 9.02\")   Wt 99.8 kg (220 lb)   LMP 09/29/2006   BMI 32.47 kg/m      Constitutional:  cooperative, alert and oriented, well developed, well nourished, in no acute distress        Skin:  warm and dry to the touch, no apparent skin lesions or masses noted          Head:  normocephalic, no masses " or lesions        Eyes:  pupils equal and round, conjunctivae and lids unremarkable, sclera white, no xanthalasma, EOMS intact, no nystagmus        Lymph:No Cervical lymphadenopathy present     ENT:  no pallor or cyanosis, dentition good        Neck:  carotid pulses are full and equal bilaterally, JVP normal, no carotid bruit        Respiratory:  normal breath sounds, clear to auscultation, normal A-P diameter, normal symmetry, normal respiratory excursion, no use of accessory muscles         Cardiac: regular rhythm, normal S1/S2, no S3 or S4, apical impulse not displaced, no murmurs, gallops or rubs frequent premature beats           PVC on rhythm strip, some V bigemeny  pulses full and equal, no bruits auscultated                                        GI:  abdomen soft, non-tender, BS normoactive, no mass, no HSM, no bruits obese      Extremities and Muscular Skeletal:  no deformities, clubbing, cyanosis, erythema observed;no edema              Neurological:  no gross motor deficits        Psych:  Alert and Oriented x 3      Recent Lab Results:  LIPID RESULTS:  Lab Results   Component Value Date    CHOL 222 (H) 04/12/2018    HDL 35 (L) 04/12/2018     (H) 04/12/2018    TRIG 171 (H) 04/12/2018    CHOLHDLRATIO 5.5 (H) 03/25/2014       LIVER ENZYME RESULTS:  Lab Results   Component Value Date    AST 12 04/12/2018    ALT 28 04/12/2018       CBC RESULTS:  Lab Results   Component Value Date    WBC 13.1 (H) 01/13/2020    RBC 5.27 (H) 01/13/2020    HGB 15.2 01/13/2020    HCT 46.0 01/13/2020    MCV 87 01/13/2020    MCH 28.8 01/13/2020    MCHC 33.0 01/13/2020    RDW 14.1 01/13/2020     01/13/2020       BMP RESULTS:  Lab Results   Component Value Date     04/12/2018    POTASSIUM 3.9 04/12/2018    CHLORIDE 111 (H) 04/12/2018    CO2 26 04/12/2018    ANIONGAP 5 04/12/2018    GLC 91 04/12/2018    BUN 15 04/12/2018    CR 0.73 04/12/2018    GFRESTIMATED 82 04/12/2018    GFRESTBLACK >90 04/12/2018    JACK 8.5  04/12/2018        A1C RESULTS:  Lab Results   Component Value Date    A1C 5.8 04/12/2018       INR RESULTS:  Lab Results   Component Value Date    INR 1.09 06/16/2005           CC  No referring provider defined for this encounter.

## 2020-01-14 NOTE — LETTER
1/14/2020      [place]  [address] [address] [facility] [address]      RE: Felisha Gorman       Dear Colleague,    I had the pleasure of seeing Felisha Gorman in the [facility].    Service Date: 01/14/2020      CARDIOLOGY OFFICE NOTE      HISTORY OF PRESENT ILLNESS:  I had the pleasure of seeing your patient, Felisha Gorman, at the [facility] [place].  She was referred in for palpitations.  She does not have any known heart history, although she tells me she was on a beta blocker before and it might have been for palpitations.  She has been under a lot of stress, emotional stress, work stress and per her admission, heavy amounts of caffeine and even a little bit of dark chocolate.  She identifies palpitations described as flip-flop beats, not racing.  The only dizziness she has is if she stands up quickly.  She has a history of hypertension for which she is on losartan.  Her blood pressure is high today but she freely admits that she misses many doses of losartan.  There has been no recent febrile illness.  She has no exercise intolerance, although she does not exercise regularly.  There is a family history of atrial fib in her mother and her brother who had coronary disease and a heart attack at a younger age.  This patient's cholesterol numbers were quite abnormal in 2018 and have not been addressed since that time.  A thyroid level was checked some time ago.  CBC was done yesterday.  The white count was up a little bit but otherwise the CBC was normal and the other lab tests that are available are really relatively unremarkable.  EKG shows a left anterior fascicular block with sinus rhythm.      On my exam, I did hear indeed her palpitations.  I put her on a rhythm strip and she has unifocal PVCs, occasionally in a bigeminal pattern.  The PVCs have a positive axis in II, III and F and a left bundle branch pattern suggesting that they are likely coming from the right  ventricular outflow tract.  This is a common location for palpitations.  At this time, since her blood pressure is still running high on the losartan and fully understanding that she states she is not taking it regularly, I first asked her to decrease her caffeine markedly.  I am going to start her on metoprolol XL 50 mg a day, both for its blood pressure effects and for what might be catecholamine-induced or RV outflow tract PVCs.  We will have her come back in about a week or so, check her blood pressure and she will tell us subjectively if the PVCs are less prominent.  We will get an echocardiogram, as I mentioned.  At a future date, we may want to do a stress test since she has risk factors including family history of hypertension, hyperlipidemia.  After we get the blood pressure and palpitations controlled, I would strongly encourage her to go on a cholesterol pill such as Crestor.  Her hemoglobin A1c was checked in the past.  It was normal which is reassuring.  If the palpitations do not go away, we could consider sending her to an EP consultant on occasion.  What I am assuming is an RV outflow tract location can be ablated.  I did explain in depth today how caffeine can trigger some of these as can exercise since sometimes these are indeed catecholamine-induced.  We talked a bit about diet control for the cholesterol, need for stress test in the future.  We touched on cholesterol, weight and diet.      Thank you for allowing me to see Ms. Jorgensen.  She will return in about a week after her electrolyte panel, her trial of metoprolol 50 mg a day and her echo.        Today's visit was 1 hour, greater than 50% counseling.      Daniel Huggins MD       cc:   Aubrie Antoine MD    [place] [phone]         DANIEL HUGGINS MD             D: 01/14/2020   T: 01/14/2020   MT: SILVIA      Name:     ISAAC JORGENSEN   MRN:      [id number]        Account:      [id number]    :      1960           Service Date: 2020      Document: [id number]         Outpatient Encounter Medications as of 2020   Medication Sig Dispense Refill     albuterol (VENTOLIN HFA) 108 (90 Base) MCG/ACT inhaler Inhale 2 puffs into the lungs every 6 hours 18 g 3     ALPRAZolam (XANAX) 0.5 MG tablet Take 1 tablet (0.5 mg) by mouth 3 times daily as needed for anxiety 20 tablet 0     FLUoxetine (PROZAC) 40 MG capsule TAKE ONE CAPSULE BY MOUTH EVERY DAY 90 capsule 0     fluticasone (FLONASE) 50 MCG/ACT nasal spray Spray 1-2 sprays into both nostrils daily 3 Package 3     fluticasone (FLOVENT HFA) 220 MCG/ACT inhaler Inhale 2 puffs into the lungs 2 times daily 2 puffs bid and rinse and spit after use 1 Inhaler 3     losartan (COZAAR) 50 MG tablet TAKE 1 TABLET(50 MG) BY MOUTH DAILY 30 tablet 0     metoprolol succinate ER (TOPROL-XL) 50 MG 24 hr tablet Take 1 tablet (50 mg) by mouth daily 30 tablet 11     montelukast (SINGULAIR) 10 MG tablet Take 1 tablet (10 mg) by mouth At Bedtime 90 tablet 1     acyclovir (ZOVIRAX) 400 MG tablet Take 1 tablet (400 mg) by mouth 3 times daily for 7 days 21 tablet 1     No facility-administered encounter medications on file as of 2020.        Again, thank you for allowing me to participate in the care of your patient.      Sincerely,    Daniel Griffith MD     [facility]

## 2020-01-14 NOTE — PATIENT INSTRUCTIONS
Call [facility] at [phone] to set up cardiac study.    I'll follow up with lab results and cardiac study results.

## 2020-01-17 ENCOUNTER — HOSPITAL ENCOUNTER (OUTPATIENT)
Dept: CARDIOLOGY | Facility: CLINIC | Age: 60
Discharge: HOME | End: 2020-01-17
Attending: INTERNAL MEDICINE | Admitting: INTERNAL MEDICINE
Payer: COMMERCIAL

## 2020-01-17 DIAGNOSIS — R00.2 PALPITATIONS: ICD-10-CM

## 2020-01-17 DIAGNOSIS — I47.0 RE-ENTRY VENTRICULAR ARRHYTHMIA (H): ICD-10-CM

## 2020-01-17 LAB
ANION GAP SERPL CALCULATED.3IONS-SCNC: 12 MMOL/L (ref 6–17)
BUN SERPL-MCNC: 18 MG/DL (ref 7–30)
CALCIUM SERPL-MCNC: 9.2 MG/DL (ref 8.5–10.5)
CHLORIDE SERPL-SCNC: 104 MMOL/L (ref 98–107)
CO2 SERPL-SCNC: 27 MMOL/L (ref 23–29)
CREAT SERPL-MCNC: 0.75 MG/DL (ref 0.7–1.3)
GFR SERPL CREATININE-BSD FRML MDRD: 78 ML/MIN/{1.73_M2}
GLUCOSE SERPL-MCNC: 101 MG/DL (ref 70–105)
POTASSIUM SERPL-SCNC: 4 MMOL/L (ref 3.5–5.1)
SODIUM SERPL-SCNC: 139 MMOL/L (ref 136–145)

## 2020-01-17 PROCEDURE — 93306 TTE W/DOPPLER COMPLETE: CPT | Mod: 26 | Performed by: INTERNAL MEDICINE

## 2020-01-17 PROCEDURE — 80048 BASIC METABOLIC PNL TOTAL CA: CPT | Performed by: INTERNAL MEDICINE

## 2020-01-17 PROCEDURE — 25500064 ZZH RX 255 OP 636: Performed by: INTERNAL MEDICINE

## 2020-01-17 PROCEDURE — 93306 TTE W/DOPPLER COMPLETE: CPT

## 2020-01-17 PROCEDURE — 36415 COLL VENOUS BLD VENIPUNCTURE: CPT | Performed by: INTERNAL MEDICINE

## 2020-01-17 RX ADMIN — HUMAN ALBUMIN MICROSPHERES AND PERFLUTREN 9 ML: 10; .22 INJECTION, SOLUTION INTRAVENOUS at 08:30

## 2020-01-28 ENCOUNTER — OFFICE VISIT (OUTPATIENT)
Dept: CARDIOLOGY | Facility: CLINIC | Age: 60
End: 2020-01-28
Attending: INTERNAL MEDICINE
Payer: COMMERCIAL

## 2020-01-28 VITALS
DIASTOLIC BLOOD PRESSURE: 78 MMHG | HEART RATE: 72 BPM | BODY MASS INDEX: 32.49 KG/M2 | SYSTOLIC BLOOD PRESSURE: 126 MMHG | HEIGHT: 69 IN

## 2020-01-28 DIAGNOSIS — E78.5 DYSLIPIDEMIA: ICD-10-CM

## 2020-01-28 DIAGNOSIS — I47.0 RE-ENTRY VENTRICULAR ARRHYTHMIA (H): ICD-10-CM

## 2020-01-28 DIAGNOSIS — I10 HYPERTENSION GOAL BP (BLOOD PRESSURE) < 140/90: ICD-10-CM

## 2020-01-28 DIAGNOSIS — I49.3 PVC'S (PREMATURE VENTRICULAR CONTRACTIONS): Primary | ICD-10-CM

## 2020-01-28 DIAGNOSIS — R00.2 PALPITATIONS: ICD-10-CM

## 2020-01-28 DIAGNOSIS — D72.829 LEUKOCYTOSIS, UNSPECIFIED TYPE: ICD-10-CM

## 2020-01-28 LAB
ALT SERPL W P-5'-P-CCNC: 9 U/L (ref 5–30)
BASOPHILS # BLD AUTO: 0.2 10E9/L (ref 0–0.2)
BASOPHILS NFR BLD AUTO: 1.5 %
CHOLEST SERPL-MCNC: 209 MG/DL
DIFFERENTIAL METHOD BLD: ABNORMAL
EOSINOPHIL # BLD AUTO: 0.8 10E9/L (ref 0–0.7)
EOSINOPHIL NFR BLD AUTO: 7 %
ERYTHROCYTE [DISTWIDTH] IN BLOOD BY AUTOMATED COUNT: 14.1 % (ref 10–15)
HCT VFR BLD AUTO: 45.1 % (ref 35–47)
HDLC SERPL-MCNC: 48 MG/DL
HGB BLD-MCNC: 14.9 G/DL (ref 11.7–15.7)
IMM GRANULOCYTES # BLD: 0 10E9/L (ref 0–0.4)
IMM GRANULOCYTES NFR BLD: 0.3 %
LDLC SERPL CALC-MCNC: 111 MG/DL
LYMPHOCYTES # BLD AUTO: 3.8 10E9/L (ref 0.8–5.3)
LYMPHOCYTES NFR BLD AUTO: 33.8 %
MCH RBC QN AUTO: 29.4 PG (ref 26.5–33)
MCHC RBC AUTO-ENTMCNC: 33 G/DL (ref 31.5–36.5)
MCV RBC AUTO: 89 FL (ref 78–100)
MONOCYTES # BLD AUTO: 0.8 10E9/L (ref 0–1.3)
MONOCYTES NFR BLD AUTO: 7 %
NEUTROPHILS # BLD AUTO: 5.6 10E9/L (ref 1.6–8.3)
NEUTROPHILS NFR BLD AUTO: 50.4 %
NONHDLC SERPL-MCNC: 161 MG/DL
NRBC # BLD AUTO: 0 10*3/UL
NRBC BLD AUTO-RTO: 0 /100
PLATELET # BLD AUTO: 424 10E9/L (ref 150–450)
RBC # BLD AUTO: 5.06 10E12/L (ref 3.8–5.2)
TRIGL SERPL-MCNC: 252 MG/DL
WBC # BLD AUTO: 11.1 10E9/L (ref 4–11)

## 2020-01-28 PROCEDURE — 93000 ELECTROCARDIOGRAM COMPLETE: CPT | Performed by: NURSE PRACTITIONER

## 2020-01-28 PROCEDURE — 99214 OFFICE O/P EST MOD 30 MIN: CPT | Performed by: NURSE PRACTITIONER

## 2020-01-28 PROCEDURE — 84460 ALANINE AMINO (ALT) (SGPT): CPT | Performed by: NURSE PRACTITIONER

## 2020-01-28 PROCEDURE — 85025 COMPLETE CBC W/AUTO DIFF WBC: CPT | Performed by: NURSE PRACTITIONER

## 2020-01-28 PROCEDURE — 80061 LIPID PANEL: CPT | Performed by: NURSE PRACTITIONER

## 2020-01-28 PROCEDURE — 36415 COLL VENOUS BLD VENIPUNCTURE: CPT | Performed by: NURSE PRACTITIONER

## 2020-01-28 NOTE — LETTER
2020    [place]  [address] [address]  [facility] [address]    RE: Felisha RICHARDS Sherif       Dear Colleague,    I had the pleasure of seeing Felisha Gorman in the [facility].    History of Present Illness:    Felisha Gorman is a 60 year old female who recently met Dr. Griffith for a consultation for palpitations. She returns today to follow up on her test results.    Felisha reported to Dr. Griffith that she had been under stress including emotional (father ), work stress and heavy amounts of caffeine including dark chocolate. EKG showed a left anterior fascicular block with sinus rhythm. A rhythm strip at the last office visit noted PVC's that are likely coming from the RVOT (postivie axis in lead II, III, AVF and a LBBB pattern).    Her palpitations felt like flip-flops, not racing. She has a history of hypertension but admitted that she missed many doses of Losartan-she has been somewhat better at taking the losartan regularly.    Her mother had atrial fibrillation; a brother had coronary artery disease and MI at a young age.     Felisha has dyslipidemia with lipid panel in 2018 showing total cholesterol 222 HDL 35  . Dr. Griffith suggested she think about starting Crestor. I repeated a panel today non-fasting and LDL is better at 111 TG are up but she was not fasting (252) HDL is better at 48      Previous labs:  Sodium 139 potassium 4.0 BUN 18 Creatinine 0.75 TSH 1.78 with a normal hepatic panel.    At some point ischemic work up will be needed; She had a nuclear scan in  showing no ischemia but attenuation artifact was an issue; Echo images required contrast so stress echo may not be optimal.    Echocardiogram: EF 60% with 1+ MR; no LVH; grade I diastolic dysfunction    She overall states her PVC's have reduced with limiting caffeine. She has not started taking the Metoprolol yet.    I drew a non-fasting lipid panel today which came back after the visit  and the patient was contacted.    Total cholesterol 209 HDL 48    She does not exercise and is overweight. We reviewed her food intake and she eats at least 60 pistachios daily at work. This is likely more calories than she needs. I suggested she reduce these a bit to attempt weight loss. Her exercise is limited by busy job and joint discomforts.    Exam in unremarkable.    Impression/Plan:     1. Palpitations with PVC's-improved with limited caffeine  -has not started Metoprolol-will begin with 1/2 pill=25mg daily instead of 50mg daily in am or pm-her choice  Possible RVOT location  EF preserved  Will need ischemic work up  Consider CTA or cardiac MRI as echo images need contrast and previous nuclear scan in 2005 had attenuation-  reviewed with Dr. Stevens and contacted patient after the visit  We will schedule a CTA--instructed her to take Metoprolol XL 50mg daily for a few days prior to her CTA so we have lower heart rates and hopefully better control of PVC's.  I will see her back to review those results  -no contrast allergy/normal renal function  -if plaque is forming, I will talk with her about adding Aspirin low dose    2. Dyslipidemia-numbers overall improved  --  IF CTA shows disease, she may need low dose Crestor    3. Hypertension-reasonable control  losartan          It has been a pleasure seeing Felisha RICHARDS Sherif in follow up. I will see her in one month.    Greater than 50% of this 30 minute visit was spent in counseling/collaboration; this plan was reviewed with Dr. Griffith.    Pola Nieves, MSN, APRN-BC, CNP  Cardiology    Orders Placed This Encounter   Procedures     CTA Angiogram coronary artery     Lipid Profile     ALT     Follow-Up with Cardiac Advanced Practice Provider     EKG 12-lead complete w/read - Clinics (performed today)     No orders of the defined types were placed in this encounter.    There are no discontinued medications.      Encounter Diagnoses   Name  Primary?     Palpitations      Re-entry ventricular arrhythmia (H)      Hypertension goal BP (blood pressure) < 140/90 Yes     Dyslipidemia      PVC's (premature ventricular contractions)        CURRENT MEDICATIONS:  Current Outpatient Medications   Medication Sig Dispense Refill     albuterol (VENTOLIN HFA) 108 (90 Base) MCG/ACT inhaler Inhale 2 puffs into the lungs every 6 hours 18 g 3     fluticasone (FLONASE) 50 MCG/ACT nasal spray Spray 1-2 sprays into both nostrils daily 3 Package 3     fluticasone (FLOVENT HFA) 220 MCG/ACT inhaler Inhale 2 puffs into the lungs 2 times daily 2 puffs bid and rinse and spit after use 1 Inhaler 3     losartan (COZAAR) 50 MG tablet TAKE 1 TABLET(50 MG) BY MOUTH DAILY 30 tablet 0     montelukast (SINGULAIR) 10 MG tablet Take 1 tablet (10 mg) by mouth At Bedtime 90 tablet 1     acyclovir (ZOVIRAX) 400 MG tablet Take 1 tablet (400 mg) by mouth 3 times daily for 7 days 21 tablet 1     ALPRAZolam (XANAX) 0.5 MG tablet Take 1 tablet (0.5 mg) by mouth 3 times daily as needed for anxiety 20 tablet 0     FLUoxetine (PROZAC) 40 MG capsule TAKE ONE CAPSULE BY MOUTH EVERY DAY (Patient not taking: Reported on 1/28/2020) 90 capsule 0     metoprolol succinate ER (TOPROL-XL) 50 MG 24 hr tablet Take 1 tablet (50 mg) by mouth daily 30 tablet 11       ALLERGIES     Allergies   Allergen Reactions     Atorvastatin      upset     Penicillin [Penicillins] Rash       PAST MEDICAL HISTORY:  Past Medical History:   Diagnosis Date     Allergy, unspecified not elsewhere classified      Dysthymic disorder      Essential hypertension, benign      flight anxiety      Frequent BRONCHITIS     usually in winter     HSV (herpes simplex virus) anogenital infection      Impaired fasting glucose 9/23/2007    Glucose 101 9/07     Left anterior hemiblock      Mild intermittent asthma     URI induced     Obstructive sleep apnea (adult) (pediatric)     not on CPAP; feels rested 2014     Other and unspecified  hyperlipidemia      Palpitations     PVC and PVC bigmeny     Rheumatic fever without mention of heart involvement     no sequelae     transient neurologic sx 2005    ? TIA; had MRI with temporal lobe lesion that is getting smaller       PAST SURGICAL HISTORY:  Past Surgical History:   Procedure Laterality Date     C NONSPECIFIC PROCEDURE      nasal polyp removed     COLONOSCOPY      hyperplastic polyp; repeat 10 years     SURGICAL HISTORY OF -       endometrial ablation     SURGICAL HISTORY OF -   15    bilateral breast reduction and tummy tuck     TONSILLECTOMY         FAMILY HISTORY:  Family History   Problem Relation Age of Onset     KURT Brother         age 50     Heart Disease Brother      KURT Mother      Heart Disease Mother      Cancer Mother         ovarian OK now     Cancer - colorectal Mother         cancerous polyp     KURT Father         MI     Heart Disease Father        SOCIAL HISTORY:  Social History     Socioeconomic History     Marital status: Legally      Spouse name: Dorian     Number of children: 1     Years of education: None     Highest education level: None   Occupational History     Occupation:       Employer: [employer]   Social Needs     Financial resource strain: None     Food insecurity:     Worry: None     Inability: None     Transportation needs:     Medical: None     Non-medical: None   Tobacco Use     Smoking status: Former Smoker     Packs/day: 2.00     Years: 10.00     Pack years: 20.00     Types: Cigarettes     Last attempt to quit: 1986     Years since quittin.1     Smokeless tobacco: Never Used   Substance and Sexual Activity     Alcohol use: No     Alcohol/week: 0.0 - 0.8 standard drinks     Drug use: No     Sexual activity: Yes     Partners: Male     Birth control/protection: Condom   Lifestyle     Physical activity:     Days per week: None     Minutes per session: None     Stress: None  "  Relationships     Social connections:     Talks on phone: None     Gets together: None     Attends Jainism service: None     Active member of club or organization: None     Attends meetings of clubs or organizations: None     Relationship status: None     Intimate partner violence:     Fear of current or ex partner: None     Emotionally abused: None     Physically abused: None     Forced sexual activity: None   Other Topics Concern      Service Not Asked     Blood Transfusions Not Asked     Caffeine Concern Not Asked     Occupational Exposure Not Asked     Hobby Hazards Not Asked     Sleep Concern Not Asked     Stress Concern Not Asked     Weight Concern Not Asked     Special Diet No     Comment: stress eating recently     Back Care Not Asked     Exercise No     Bike Helmet Not Asked     Seat Belt Not Asked     Self-Exams Not Asked     Parent/sibling w/ CABG, MI or angioplasty before 65F 55M? Yes   Social History Narrative     None       Review of Systems:  Skin:  Negative       Eyes:  Positive for glasses    ENT:  Negative      Respiratory:  Negative       Cardiovascular:    Positive for;palpitations(not as often as last OV)    Gastroenterology: Negative      Genitourinary:  not assessed      Musculoskeletal:  Negative      Neurologic:  Negative      Psychiatric:  Positive for sleep disturbances    Heme/Lymph/Imm:  Negative      Endocrine:  Negative        Physical Exam:  Vitals: /78   Pulse 72   Ht 1.753 m (5' 9\")   LMP 09/29/2006   BMI 32.49 kg/m       Constitutional:  cooperative, alert and oriented, well developed, well nourished, in no acute distress        Skin:  warm and dry to the touch, no apparent skin lesions or masses noted          Head:  normocephalic, no masses or lesions        Eyes:  pupils equal and round, conjunctivae and lids unremarkable, sclera white, no xanthalasma, EOMS intact, no nystagmus        Lymph:No Cervical lymphadenopathy present     ENT:  no pallor or " cyanosis, dentition good        Neck:  carotid pulses are full and equal bilaterally, JVP normal, no carotid bruit        Respiratory:  normal breath sounds, clear to auscultation, normal A-P diameter, normal symmetry, normal respiratory excursion, no use of accessory muscles         Cardiac: regular rhythm, normal S1/S2, no S3 or S4, apical impulse not displaced, no murmurs, gallops or rubs frequent premature beats           PVC on rhythm strip, some V bigemeny  pulses full and equal, no bruits auscultated                                        GI:  abdomen soft, non-tender, BS normoactive, no mass, no HSM, no bruits obese      Extremities and Muscular Skeletal:  no deformities, clubbing, cyanosis, erythema observed;no edema              Neurological:  no gross motor deficits        Psych:  Alert and Oriented x 3      Recent Lab Results:  LIPID RESULTS:  Lab Results   Component Value Date    CHOL 209 (H) 01/28/2020    HDL 48 (L) 01/28/2020     (H) 01/28/2020    TRIG 252 (H) 01/28/2020    CHOLHDLRATIO 5.5 (H) 03/25/2014       LIVER ENZYME RESULTS:  Lab Results   Component Value Date    AST 15 01/13/2020    ALT 9 01/28/2020       CBC RESULTS:  Lab Results   Component Value Date    WBC 13.1 (H) 01/13/2020    RBC 5.27 (H) 01/13/2020    HGB 15.2 01/13/2020    HCT 46.0 01/13/2020    MCV 87 01/13/2020    MCH 28.8 01/13/2020    MCHC 33.0 01/13/2020    RDW 14.1 01/13/2020     01/13/2020       BMP RESULTS:  Lab Results   Component Value Date     01/17/2020    POTASSIUM 4.0 01/17/2020    CHLORIDE 104 01/17/2020    CO2 27 01/17/2020    ANIONGAP 12 01/17/2020     01/17/2020    BUN 18 01/17/2020    CR 0.75 01/17/2020    GFRESTIMATED 78 01/17/2020    GFRESTBLACK >90 01/17/2020    JACK 9.2 01/17/2020        A1C RESULTS:  Lab Results   Component Value Date    A1C 5.8 04/12/2018       INR RESULTS:  Lab Results   Component Value Date    INR 1.09 06/16/2005           Thank you for allowing me to participate  in the care of your patient.    Sincerely,     COLETTE Moncada [facility]

## 2020-01-28 NOTE — PROGRESS NOTES
History of Present Illness:    Felisha Gorman is a 60 year old female who recently met Dr. Griffith for a consultation for palpitations. She returns today to follow up on her test results.    Felisha reported to Dr. Griffith that she had been under stress including emotional (father ), work stress and heavy amounts of caffeine including dark chocolate. EKG showed a left anterior fascicular block with sinus rhythm. A rhythm strip at the last office visit noted PVC's that are likely coming from the RVOT (postivie axis in lead II, III, AVF and a LBBB pattern).    Her palpitations felt like flip-flops, not racing. She has a history of hypertension but admitted that she missed many doses of Losartan-she has been somewhat better at taking the losartan regularly.    Her mother had atrial fibrillation; a brother had coronary artery disease and MI at a young age.     Felisha has dyslipidemia with lipid panel in 2018 showing total cholesterol 222 HDL 35  . Dr. Griffith suggested she think about starting Crestor. I repeated a panel today non-fasting and LDL is better at 111 TG are up but she was not fasting (252) HDL is better at 48      Previous labs:  Sodium 139 potassium 4.0 BUN 18 Creatinine 0.75 TSH 1.78 with a normal hepatic panel.    At some point ischemic work up will be needed; She had a nuclear scan in  showing no ischemia but attenuation artifact was an issue; Echo images required contrast so stress echo may not be optimal.    Echocardiogram: EF 60% with 1+ MR; no LVH; grade I diastolic dysfunction    She overall states her PVC's have reduced with limiting caffeine. She has not started taking the Metoprolol yet.    I drew a non-fasting lipid panel today which came back after the visit and the patient was contacted.    Total cholesterol 209 HDL 48    She does not exercise and is overweight. We reviewed her food intake and she eats at least 60 pistachios daily at work. This is likely more  calories than she needs. I suggested she reduce these a bit to attempt weight loss. Her exercise is limited by busy job and joint discomforts.    Exam in unremarkable.    Impression/Plan:     1. Palpitations with PVC's-improved with limited caffeine  -has not started Metoprolol-will begin with 1/2 pill=25mg daily instead of 50mg daily in am or pm-her choice  Possible RVOT location  EF preserved  Will need ischemic work up  Consider CTA or cardiac MRI as echo images need contrast and previous nuclear scan in 2005 had attenuation-  reviewed with Dr. Stevens and contacted patient after the visit  We will schedule a CTA--instructed her to take Metoprolol XL 50mg daily for a few days prior to her CTA so we have lower heart rates and hopefully better control of PVC's.  I will see her back to review those results  -no contrast allergy/normal renal function  -if plaque is forming, I will talk with her about adding Aspirin low dose    2. Dyslipidemia-numbers overall improved  --  IF CTA shows disease, she may need low dose Crestor    3. Hypertension-reasonable control  losartan          It has been a pleasure seeing Felisha Gorman in follow up. I will see her in one month.    Greater than 50% of this 30 minute visit was spent in counseling/collaboration; this plan was reviewed with Dr. Griffith.    Pola Nieves, MSN, APRN-BC, CNP  Cardiology    Orders Placed This Encounter   Procedures     CTA Angiogram coronary artery     Lipid Profile     ALT     Follow-Up with Cardiac Advanced Practice Provider     EKG 12-lead complete w/read - Clinics (performed today)     No orders of the defined types were placed in this encounter.    There are no discontinued medications.      Encounter Diagnoses   Name Primary?     Palpitations      Re-entry ventricular arrhythmia (H)      Hypertension goal BP (blood pressure) < 140/90 Yes     Dyslipidemia      PVC's (premature ventricular contractions)        CURRENT  MEDICATIONS:  Current Outpatient Medications   Medication Sig Dispense Refill     albuterol (VENTOLIN HFA) 108 (90 Base) MCG/ACT inhaler Inhale 2 puffs into the lungs every 6 hours 18 g 3     fluticasone (FLONASE) 50 MCG/ACT nasal spray Spray 1-2 sprays into both nostrils daily 3 Package 3     fluticasone (FLOVENT HFA) 220 MCG/ACT inhaler Inhale 2 puffs into the lungs 2 times daily 2 puffs bid and rinse and spit after use 1 Inhaler 3     losartan (COZAAR) 50 MG tablet TAKE 1 TABLET(50 MG) BY MOUTH DAILY 30 tablet 0     montelukast (SINGULAIR) 10 MG tablet Take 1 tablet (10 mg) by mouth At Bedtime 90 tablet 1     acyclovir (ZOVIRAX) 400 MG tablet Take 1 tablet (400 mg) by mouth 3 times daily for 7 days 21 tablet 1     ALPRAZolam (XANAX) 0.5 MG tablet Take 1 tablet (0.5 mg) by mouth 3 times daily as needed for anxiety 20 tablet 0     FLUoxetine (PROZAC) 40 MG capsule TAKE ONE CAPSULE BY MOUTH EVERY DAY (Patient not taking: Reported on 1/28/2020) 90 capsule 0     metoprolol succinate ER (TOPROL-XL) 50 MG 24 hr tablet Take 1 tablet (50 mg) by mouth daily 30 tablet 11       ALLERGIES     Allergies   Allergen Reactions     Atorvastatin      upset     Penicillin [Penicillins] Rash       PAST MEDICAL HISTORY:  Past Medical History:   Diagnosis Date     Allergy, unspecified not elsewhere classified      Dysthymic disorder      Essential hypertension, benign      flight anxiety      Frequent BRONCHITIS     usually in winter     HSV (herpes simplex virus) anogenital infection      Impaired fasting glucose 9/23/2007    Glucose 101 9/07     Left anterior hemiblock      Mild intermittent asthma     URI induced     Obstructive sleep apnea (adult) (pediatric)     not on CPAP; feels rested 2014     Other and unspecified hyperlipidemia      Palpitations     PVC and PVC bigmeny     Rheumatic fever without mention of heart involvement     no sequelae     transient neurologic sx 2005    ? TIA; had MRI with temporal lobe lesion that is  getting smaller       PAST SURGICAL HISTORY:  Past Surgical History:   Procedure Laterality Date     C NONSPECIFIC PROCEDURE      nasal polyp removed     COLONOSCOPY      hyperplastic polyp; repeat 10 years     SURGICAL HISTORY OF -       endometrial ablation     SURGICAL HISTORY OF -   15    bilateral breast reduction and tummy tuck     TONSILLECTOMY         FAMILY HISTORY:  Family History   Problem Relation Age of Onset     KURT Brother         age 50     Heart Disease Brother      ERAAAIMEE Mother      Heart Disease Mother      Cancer Mother         ovarian OK now     Cancer - colorectal Mother         cancerous polyp     C.A.JAZMINE Father         MI     Heart Disease Father        SOCIAL HISTORY:  Social History     Socioeconomic History     Marital status: Legally      Spouse name: Dorian     Number of children: 1     Years of education: None     Highest education level: None   Occupational History     Occupation:       Employer: [employer]   Social Needs     Financial resource strain: None     Food insecurity:     Worry: None     Inability: None     Transportation needs:     Medical: None     Non-medical: None   Tobacco Use     Smoking status: Former Smoker     Packs/day: 2.00     Years: 10.00     Pack years: 20.00     Types: Cigarettes     Last attempt to quit: 1986     Years since quittin.1     Smokeless tobacco: Never Used   Substance and Sexual Activity     Alcohol use: No     Alcohol/week: 0.0 - 0.8 standard drinks     Drug use: No     Sexual activity: Yes     Partners: Male     Birth control/protection: Condom   Lifestyle     Physical activity:     Days per week: None     Minutes per session: None     Stress: None   Relationships     Social connections:     Talks on phone: None     Gets together: None     Attends Episcopalian service: None     Active member of club or organization: None     Attends meetings of clubs or organizations: None      "Relationship status: None     Intimate partner violence:     Fear of current or ex partner: None     Emotionally abused: None     Physically abused: None     Forced sexual activity: None   Other Topics Concern      Service Not Asked     Blood Transfusions Not Asked     Caffeine Concern Not Asked     Occupational Exposure Not Asked     Hobby Hazards Not Asked     Sleep Concern Not Asked     Stress Concern Not Asked     Weight Concern Not Asked     Special Diet No     Comment: stress eating recently     Back Care Not Asked     Exercise No     Bike Helmet Not Asked     Seat Belt Not Asked     Self-Exams Not Asked     Parent/sibling w/ CABG, MI or angioplasty before 65F 55M? Yes   Social History Narrative     None       Review of Systems:  Skin:  Negative       Eyes:  Positive for glasses    ENT:  Negative      Respiratory:  Negative       Cardiovascular:    Positive for;palpitations(not as often as last OV)    Gastroenterology: Negative      Genitourinary:  not assessed      Musculoskeletal:  Negative      Neurologic:  Negative      Psychiatric:  Positive for sleep disturbances    Heme/Lymph/Imm:  Negative      Endocrine:  Negative        Physical Exam:  Vitals: /78   Pulse 72   Ht 1.753 m (5' 9\")   LMP 09/29/2006   BMI 32.49 kg/m      Constitutional:  cooperative, alert and oriented, well developed, well nourished, in no acute distress        Skin:  warm and dry to the touch, no apparent skin lesions or masses noted          Head:  normocephalic, no masses or lesions        Eyes:  pupils equal and round, conjunctivae and lids unremarkable, sclera white, no xanthalasma, EOMS intact, no nystagmus        Lymph:No Cervical lymphadenopathy present     ENT:  no pallor or cyanosis, dentition good        Neck:  carotid pulses are full and equal bilaterally, JVP normal, no carotid bruit        Respiratory:  normal breath sounds, clear to auscultation, normal A-P diameter, normal symmetry, normal respiratory " excursion, no use of accessory muscles         Cardiac: regular rhythm, normal S1/S2, no S3 or S4, apical impulse not displaced, no murmurs, gallops or rubs frequent premature beats           PVC on rhythm strip, some V bigemeny  pulses full and equal, no bruits auscultated                                        GI:  abdomen soft, non-tender, BS normoactive, no mass, no HSM, no bruits obese      Extremities and Muscular Skeletal:  no deformities, clubbing, cyanosis, erythema observed;no edema              Neurological:  no gross motor deficits        Psych:  Alert and Oriented x 3      Recent Lab Results:  LIPID RESULTS:  Lab Results   Component Value Date    CHOL 209 (H) 01/28/2020    HDL 48 (L) 01/28/2020     (H) 01/28/2020    TRIG 252 (H) 01/28/2020    CHOLHDLRATIO 5.5 (H) 03/25/2014       LIVER ENZYME RESULTS:  Lab Results   Component Value Date    AST 15 01/13/2020    ALT 9 01/28/2020       CBC RESULTS:  Lab Results   Component Value Date    WBC 13.1 (H) 01/13/2020    RBC 5.27 (H) 01/13/2020    HGB 15.2 01/13/2020    HCT 46.0 01/13/2020    MCV 87 01/13/2020    MCH 28.8 01/13/2020    MCHC 33.0 01/13/2020    RDW 14.1 01/13/2020     01/13/2020       BMP RESULTS:  Lab Results   Component Value Date     01/17/2020    POTASSIUM 4.0 01/17/2020    CHLORIDE 104 01/17/2020    CO2 27 01/17/2020    ANIONGAP 12 01/17/2020     01/17/2020    BUN 18 01/17/2020    CR 0.75 01/17/2020    GFRESTIMATED 78 01/17/2020    GFRESTBLACK >90 01/17/2020    JACK 9.2 01/17/2020        A1C RESULTS:  Lab Results   Component Value Date    A1C 5.8 04/12/2018       INR RESULTS:  Lab Results   Component Value Date    INR 1.09 06/16/2005           CC  Daniel Griffith MD  [address]  [place] [id number]

## 2020-01-28 NOTE — LETTER
2020    [place]  [id number]  [facility] [address]    RE: Felisha RICHARDS Sherif       Dear Colleague,    I had the pleasure of seeing Felisha Gorman in the [facility].    History of Present Illness:    Felisha Gorman is a 60 year old female who recently met Dr. Griffith for a consultation for palpitations. She returns today to follow up on her test results.    Felisha reported to Dr. Griffith that she had been under stress including emotional (father ), work stress and heavy amounts of caffeine including dark chocolate. EKG showed a left anterior fascicular block with sinus rhythm. A rhythm strip at the last office visit noted PVC's that are likely coming from the RVOT (postivie axis in lead II, III, AVF and a LBBB pattern).    Her palpitations felt like flip-flops, not racing. She has a history of hypertension but admitted that she missed many doses of Losartan-she has been somewhat better at taking the losartan regularly.    Her mother had atrial fibrillation; a brother had coronary artery disease and MI at a young age.     Felisha has dyslipidemia with lipid panel in 2018 showing total cholesterol 222 HDL 35  . Dr. Griffith suggested she think about starting Crestor. I repeated a panel today non-fasting and LDL is better at 111 TG are up but she was not fasting (252) HDL is better at 48      Previous labs:  Sodium 139 potassium 4.0 BUN 18 Creatinine 0.75 TSH 1.78 with a normal hepatic panel.    At some point ischemic work up will be needed; She had a nuclear scan in  showing no ischemia but attenuation artifact was an issue; Echo images required contrast so stress echo may not be optimal.    Echocardiogram: EF 60% with 1+ MR; no LVH; grade I diastolic dysfunction    She overall states her PVC's have reduced with limiting caffeine. She has not started taking the Metoprolol yet.    I drew a non-fasting lipid panel today which came back after the visit  and the patient was contacted.    Total cholesterol 209 HDL 48    She does not exercise and is overweight. We reviewed her food intake and she eats at least 60 pistachios daily at work. This is likely more calories than she needs. I suggested she reduce these a bit to attempt weight loss. Her exercise is limited by busy job and joint discomforts.    Exam in unremarkable.    Impression/Plan:     1. Palpitations with PVC's-improved with limited caffeine  -has not started Metoprolol-will begin with 1/2 pill=25mg daily instead of 50mg daily in am or pm-her choice  Possible RVOT location  EF preserved  Will need ischemic work up  Consider CTA or cardiac MRI as echo images need contrast and previous nuclear scan in 2005 had attenuation-  reviewed with Dr. Stevens and contacted patient after the visit  We will schedule a CTA--instructed her to take Metoprolol XL 50mg daily for a few days prior to her CTA so we have lower heart rates and hopefully better control of PVC's.  I will see her back to review those results  -no contrast allergy/normal renal function  -if plaque is forming, I will talk with her about adding Aspirin low dose    2. Dyslipidemia-numbers overall improved  --  IF CTA shows disease, she may need low dose Crestor    3. Hypertension-reasonable control  losartan          It has been a pleasure seeing Felisha RICHARDS Sherif in follow up. I will see her in one month.    Greater than 50% of this 30 minute visit was spent in counseling/collaboration; this plan was reviewed with Dr. Griffith.    Pola Nieves, MSN, APRN-BC, CNP  Cardiology    Orders Placed This Encounter   Procedures     CTA Angiogram coronary artery     Lipid Profile     ALT     Follow-Up with Cardiac Advanced Practice Provider     EKG 12-lead complete w/read - Clinics (performed today)     No orders of the defined types were placed in this encounter.    There are no discontinued medications.      Encounter Diagnoses   Name  Primary?     Palpitations      Re-entry ventricular arrhythmia (H)      Hypertension goal BP (blood pressure) < 140/90 Yes     Dyslipidemia      PVC's (premature ventricular contractions)        CURRENT MEDICATIONS:  Current Outpatient Medications   Medication Sig Dispense Refill     albuterol (VENTOLIN HFA) 108 (90 Base) MCG/ACT inhaler Inhale 2 puffs into the lungs every 6 hours 18 g 3     fluticasone (FLONASE) 50 MCG/ACT nasal spray Spray 1-2 sprays into both nostrils daily 3 Package 3     fluticasone (FLOVENT HFA) 220 MCG/ACT inhaler Inhale 2 puffs into the lungs 2 times daily 2 puffs bid and rinse and spit after use 1 Inhaler 3     losartan (COZAAR) 50 MG tablet TAKE 1 TABLET(50 MG) BY MOUTH DAILY 30 tablet 0     montelukast (SINGULAIR) 10 MG tablet Take 1 tablet (10 mg) by mouth At Bedtime 90 tablet 1     acyclovir (ZOVIRAX) 400 MG tablet Take 1 tablet (400 mg) by mouth 3 times daily for 7 days 21 tablet 1     ALPRAZolam (XANAX) 0.5 MG tablet Take 1 tablet (0.5 mg) by mouth 3 times daily as needed for anxiety 20 tablet 0     FLUoxetine (PROZAC) 40 MG capsule TAKE ONE CAPSULE BY MOUTH EVERY DAY (Patient not taking: Reported on 1/28/2020) 90 capsule 0     metoprolol succinate ER (TOPROL-XL) 50 MG 24 hr tablet Take 1 tablet (50 mg) by mouth daily 30 tablet 11       ALLERGIES     Allergies   Allergen Reactions     Atorvastatin      upset     Penicillin [Penicillins] Rash       PAST MEDICAL HISTORY:  Past Medical History:   Diagnosis Date     Allergy, unspecified not elsewhere classified      Dysthymic disorder      Essential hypertension, benign      flight anxiety      Frequent BRONCHITIS     usually in winter     HSV (herpes simplex virus) anogenital infection      Impaired fasting glucose 9/23/2007    Glucose 101 9/07     Left anterior hemiblock      Mild intermittent asthma     URI induced     Obstructive sleep apnea (adult) (pediatric)     not on CPAP; feels rested 2014     Other and unspecified  hyperlipidemia      Palpitations     PVC and PVC bigmeny     Rheumatic fever without mention of heart involvement     no sequelae     transient neurologic sx 2005    ? TIA; had MRI with temporal lobe lesion that is getting smaller       PAST SURGICAL HISTORY:  Past Surgical History:   Procedure Laterality Date     C NONSPECIFIC PROCEDURE      nasal polyp removed     COLONOSCOPY      hyperplastic polyp; repeat 10 years     SURGICAL HISTORY OF -       endometrial ablation     SURGICAL HISTORY OF -   15    bilateral breast reduction and tummy tuck     TONSILLECTOMY         FAMILY HISTORY:  Family History   Problem Relation Age of Onset     KURT Brother         age 50     Heart Disease Brother      KURT Mother      Heart Disease Mother      Cancer Mother         ovarian OK now     Cancer - colorectal Mother         cancerous polyp     KURT Father         MI     Heart Disease Father        SOCIAL HISTORY:  Social History     Socioeconomic History     Marital status: Legally      Spouse name: Dorian     Number of children: 1     Years of education: None     Highest education level: None   Occupational History     Occupation:       Employer: [employer]   Social Needs     Financial resource strain: None     Food insecurity:     Worry: None     Inability: None     Transportation needs:     Medical: None     Non-medical: None   Tobacco Use     Smoking status: Former Smoker     Packs/day: 2.00     Years: 10.00     Pack years: 20.00     Types: Cigarettes     Last attempt to quit: 1986     Years since quittin.1     Smokeless tobacco: Never Used   Substance and Sexual Activity     Alcohol use: No     Alcohol/week: 0.0 - 0.8 standard drinks     Drug use: No     Sexual activity: Yes     Partners: Male     Birth control/protection: Condom   Lifestyle     Physical activity:     Days per week: None     Minutes per session: None     Stress: None  "  Relationships     Social connections:     Talks on phone: None     Gets together: None     Attends Anabaptist service: None     Active member of club or organization: None     Attends meetings of clubs or organizations: None     Relationship status: None     Intimate partner violence:     Fear of current or ex partner: None     Emotionally abused: None     Physically abused: None     Forced sexual activity: None   Other Topics Concern      Service Not Asked     Blood Transfusions Not Asked     Caffeine Concern Not Asked     Occupational Exposure Not Asked     Hobby Hazards Not Asked     Sleep Concern Not Asked     Stress Concern Not Asked     Weight Concern Not Asked     Special Diet No     Comment: stress eating recently     Back Care Not Asked     Exercise No     Bike Helmet Not Asked     Seat Belt Not Asked     Self-Exams Not Asked     Parent/sibling w/ CABG, MI or angioplasty before 65F 55M? Yes   Social History Narrative     None       Review of Systems:  Skin:  Negative       Eyes:  Positive for glasses    ENT:  Negative      Respiratory:  Negative       Cardiovascular:    Positive for;palpitations(not as often as last OV)    Gastroenterology: Negative      Genitourinary:  not assessed      Musculoskeletal:  Negative      Neurologic:  Negative      Psychiatric:  Positive for sleep disturbances    Heme/Lymph/Imm:  Negative      Endocrine:  Negative        Physical Exam:  Vitals: /78   Pulse 72   Ht 1.753 m (5' 9\")   LMP 09/29/2006   BMI 32.49 kg/m       Constitutional:  cooperative, alert and oriented, well developed, well nourished, in no acute distress        Skin:  warm and dry to the touch, no apparent skin lesions or masses noted          Head:  normocephalic, no masses or lesions        Eyes:  pupils equal and round, conjunctivae and lids unremarkable, sclera white, no xanthalasma, EOMS intact, no nystagmus        Lymph:No Cervical lymphadenopathy present     ENT:  no pallor or " cyanosis, dentition good        Neck:  carotid pulses are full and equal bilaterally, JVP normal, no carotid bruit        Respiratory:  normal breath sounds, clear to auscultation, normal A-P diameter, normal symmetry, normal respiratory excursion, no use of accessory muscles         Cardiac: regular rhythm, normal S1/S2, no S3 or S4, apical impulse not displaced, no murmurs, gallops or rubs frequent premature beats           PVC on rhythm strip, some V bigemeny  pulses full and equal, no bruits auscultated                                        GI:  abdomen soft, non-tender, BS normoactive, no mass, no HSM, no bruits obese      Extremities and Muscular Skeletal:  no deformities, clubbing, cyanosis, erythema observed;no edema              Neurological:  no gross motor deficits        Psych:  Alert and Oriented x 3      Recent Lab Results:  LIPID RESULTS:  Lab Results   Component Value Date    CHOL 209 (H) 01/28/2020    HDL 48 (L) 01/28/2020     (H) 01/28/2020    TRIG 252 (H) 01/28/2020    CHOLHDLRATIO 5.5 (H) 03/25/2014       LIVER ENZYME RESULTS:  Lab Results   Component Value Date    AST 15 01/13/2020    ALT 9 01/28/2020       CBC RESULTS:  Lab Results   Component Value Date    WBC 13.1 (H) 01/13/2020    RBC 5.27 (H) 01/13/2020    HGB 15.2 01/13/2020    HCT 46.0 01/13/2020    MCV 87 01/13/2020    MCH 28.8 01/13/2020    MCHC 33.0 01/13/2020    RDW 14.1 01/13/2020     01/13/2020       BMP RESULTS:  Lab Results   Component Value Date     01/17/2020    POTASSIUM 4.0 01/17/2020    CHLORIDE 104 01/17/2020    CO2 27 01/17/2020    ANIONGAP 12 01/17/2020     01/17/2020    BUN 18 01/17/2020    CR 0.75 01/17/2020    GFRESTIMATED 78 01/17/2020    GFRESTBLACK >90 01/17/2020    JACK 9.2 01/17/2020        A1C RESULTS:  Lab Results   Component Value Date    A1C 5.8 04/12/2018       INR RESULTS:  Lab Results   Component Value Date    INR 1.09 06/16/2005           CC  Daniel Griffith MD  [address]  [place] [id number]                  Thank you for allowing me to participate in the care of your patient.      Sincerely,     COLETTE Moncada [facility]    cc:   Daniel Griffith MD  [address]  [place] [id number]

## 2020-01-28 NOTE — PATIENT INSTRUCTIONS
Start Metoprolol 25mg (1/2 pill) daily    I will see what kind of work up for coronary artery disease you need    Check lipid panel and we will discuss starting meds

## 2020-01-31 ENCOUNTER — TELEPHONE (OUTPATIENT)
Dept: SCHEDULING | Facility: CLINIC | Age: 60
End: 2020-01-31

## 2020-01-31 NOTE — TELEPHONE ENCOUNTER
1/31/2020    Call Regarding Preventive Health Screening Colonoscopy, Mammogram and Cervical/PAP and ReattributionPhysical       Attempt 1    Message on voicemail    Comments:       Outreach   GB

## 2020-02-11 NOTE — TELEPHONE ENCOUNTER
2/11/2020    Call Regarding Preventive Health Screening Colonoscopy, Mammogram and Cervical/PAP    Attempt 2    Comments:

## 2020-03-15 ENCOUNTER — HEALTH MAINTENANCE LETTER (OUTPATIENT)
Age: 60
End: 2020-03-15

## 2020-07-15 DIAGNOSIS — A60.00 GENITAL HERPES SIMPLEX, UNSPECIFIED SITE: ICD-10-CM

## 2020-07-15 NOTE — TELEPHONE ENCOUNTER
Routing refill request to provider for review/approval because:  Has been over a year since last prescribed in May of 2019.    Elena Palacio RN, BSN  [facility] [place]

## 2020-07-16 RX ORDER — ACYCLOVIR 400 MG/1
TABLET ORAL
Qty: 21 TABLET | Refills: 1 | Status: SHIPPED | OUTPATIENT
Start: 2020-07-16 | End: 2022-12-19

## 2020-07-17 ENCOUNTER — OFFICE VISIT (OUTPATIENT)
Dept: OBGYN | Facility: CLINIC | Age: 60
End: 2020-07-17
Payer: COMMERCIAL

## 2020-07-17 VITALS
HEART RATE: 78 BPM | DIASTOLIC BLOOD PRESSURE: 80 MMHG | BODY MASS INDEX: 30.21 KG/M2 | SYSTOLIC BLOOD PRESSURE: 132 MMHG | HEIGHT: 69 IN | WEIGHT: 204 LBS

## 2020-07-17 DIAGNOSIS — N90.4 LICHEN SCLEROSUS ET ATROPHICUS OF THE VULVA: ICD-10-CM

## 2020-07-17 DIAGNOSIS — N89.8 VAGINAL LESION: ICD-10-CM

## 2020-07-17 DIAGNOSIS — B96.89 BACTERIAL VAGINOSIS: ICD-10-CM

## 2020-07-17 DIAGNOSIS — N76.0 BACTERIAL VAGINOSIS: ICD-10-CM

## 2020-07-17 DIAGNOSIS — N89.8 ITCHING OF VAGINA: Primary | ICD-10-CM

## 2020-07-17 LAB
SPECIMEN SOURCE: ABNORMAL
WET PREP SPEC: ABNORMAL

## 2020-07-17 PROCEDURE — 87529 HSV DNA AMP PROBE: CPT | Performed by: NURSE PRACTITIONER

## 2020-07-17 PROCEDURE — 99213 OFFICE O/P EST LOW 20 MIN: CPT | Performed by: NURSE PRACTITIONER

## 2020-07-17 PROCEDURE — 87210 SMEAR WET MOUNT SALINE/INK: CPT | Performed by: NURSE PRACTITIONER

## 2020-07-17 RX ORDER — CLOBETASOL PROPIONATE 0.5 MG/G
OINTMENT TOPICAL 2 TIMES DAILY
Qty: 60 G | Refills: 1 | Status: SHIPPED | OUTPATIENT
Start: 2020-07-17 | End: 2024-08-23

## 2020-07-17 RX ORDER — METRONIDAZOLE 500 MG/1
500 TABLET ORAL 2 TIMES DAILY
Qty: 14 TABLET | Refills: 0 | Status: SHIPPED | OUTPATIENT
Start: 2020-07-17 | End: 2020-07-24

## 2020-07-17 ASSESSMENT — MIFFLIN-ST. JEOR: SCORE: 1549.72

## 2020-07-17 NOTE — PROGRESS NOTES
SUBJECTIVE:                                                   Felisha Gorman is a 60 year old female who presents to clinic today for the following health issue(s):  Patient presents with:  Vaginal Problem: Possible yeast infection-Concerns of vaginal burning and itching-History of herpes-request testing      HPI:  New patient to me here today with concerns of a possible HSV outbreak. She has a hx of HSV. Takes acyclovir PRN. Has been on TID dosing for 5 days now. Still feels twinges on the left side of her vaginal entrance.     She'd like to be tested for HSV and any vaginal infection.     She is s.a. no pain or bleeding with IC.     Unrelated, she is walking 30 minutes per day now, and is down 15+lbs from 2020. She is not taking her BP or statin meds. She is due to see cardio in a few months.     Patient's last menstrual period was 2006..     Patient is sexually active, .  Using menopause for contraception.    reports that she quit smoking about 33 years ago. Her smoking use included cigarettes. She has a 20.00 pack-year smoking history. She has never used smokeless tobacco.    STD testing offered?  Accepted For herpes-Had 40 years ago    Health maintenance updated:  yes    Today's PHQ-2 Score:   PHQ-2 (  [employer]) 2019   Q1: Little interest or pleasure in doing things 1   Q2: Feeling down, depressed or hopeless 1   PHQ-2 Score 2     Today's PHQ-9 Score:   PHQ-9 SCORE 12/10/2019   PHQ-9 Total Score -   PHQ-9 Total Score MyChart -   PHQ-9 Total Score 9     Today's BOBBY-7 Score:   BOBBY-7 SCORE 12/10/2019   Total Score -   Total Score -   Total Score 9       Problem list and histories reviewed & adjusted, as indicated.  Additional history: as documented.    Patient Active Problem List   Diagnosis     Allergic state     Dysthymic disorder     Mild intermittent asthma     Hypertension goal BP (blood pressure) < 140/90     Obstructive sleep apnea     Anxiety state     IMPAIRED FASTING  GLUCOSE     Major depression in complete remission (H)     CARDIOVASCULAR SCREENING; LDL GOAL LESS THAN 160     Family history of ovarian cancer     Knee pain     Osteoarthritis, knee     Abnormal finding on MRI of brain     Radicular low back pain     Recurrent major depressive disorder, in partial remission (H)     Non morbid obesity, unspecified obesity type     Past Surgical History:   Procedure Laterality Date     C NONSPECIFIC PROCEDURE      nasal polyp removed     COLONOSCOPY      hyperplastic polyp; repeat 10 years     SURGICAL HISTORY OF -       endometrial ablation     SURGICAL HISTORY OF -   15    bilateral breast reduction and tummy tuck     TONSILLECTOMY        Social History     Tobacco Use     Smoking status: Former Smoker     Packs/day: 2.00     Years: 10.00     Pack years: 20.00     Types: Cigarettes     Last attempt to quit: 1986     Years since quittin.5     Smokeless tobacco: Never Used   Substance Use Topics     Alcohol use: No     Alcohol/week: 0.0 - 0.8 standard drinks      Problem (# of Occurrences) Relation (Name,Age of Onset)    C.A.D. (3) Brother: age 50, Mother, Father: MI    Cancer (1) Mother: ovarian OK now    Cancer - colorectal (1) Mother: cancerous polyp    Heart Disease (3) Brother, Mother, Father            Current Outpatient Medications   Medication Sig     acyclovir (ZOVIRAX) 400 MG tablet TAKE 1 TABLET(400 MG) BY MOUTH THREE TIMES DAILY FOR 7 DAYS     clobetasol (TEMOVATE) 0.05 % external ointment Apply topically 2 times daily . 2 weeks on, 2 weeks off. External genitalia only.     metroNIDAZOLE (FLAGYL) 500 MG tablet Take 1 tablet (500 mg) by mouth 2 times daily for 7 days     albuterol (VENTOLIN HFA) 108 (90 Base) MCG/ACT inhaler Inhale 2 puffs into the lungs every 6 hours (Patient not taking: Reported on 2020)     ALPRAZolam (XANAX) 0.5 MG tablet Take 1 tablet (0.5 mg) by mouth 3 times daily as needed for anxiety (Patient not taking: Reported  "on 7/17/2020)     FLUoxetine (PROZAC) 40 MG capsule TAKE ONE CAPSULE BY MOUTH EVERY DAY (Patient not taking: Reported on 1/28/2020)     fluticasone (FLONASE) 50 MCG/ACT nasal spray Spray 1-2 sprays into both nostrils daily (Patient not taking: Reported on 7/17/2020)     fluticasone (FLOVENT HFA) 220 MCG/ACT inhaler Inhale 2 puffs into the lungs 2 times daily 2 puffs bid and rinse and spit after use (Patient not taking: Reported on 7/17/2020)     losartan (COZAAR) 50 MG tablet TAKE 1 TABLET(50 MG) BY MOUTH DAILY (Patient not taking: Reported on 7/17/2020)     metoprolol succinate ER (TOPROL-XL) 50 MG 24 hr tablet Take 1 tablet (50 mg) by mouth daily (Patient not taking: Reported on 7/17/2020)     montelukast (SINGULAIR) 10 MG tablet Take 1 tablet (10 mg) by mouth At Bedtime (Patient not taking: Reported on 7/17/2020)     No current facility-administered medications for this visit.      Allergies   Allergen Reactions     Atorvastatin      upset     Penicillin [Penicillins] Rash       ROS:  12 point review of systems negative other than symptoms noted below or in the HPI.  Genitourinary: No Periods and Vaginal Itching  No urinary frequency or dysuria, bladder or kidney problems, POSITIVE for:, vaginal itching or burning, genital herpes      OBJECTIVE:     /80 (BP Location: Right arm, Patient Position: Chair, Cuff Size: Adult Regular)   Pulse 78   Ht 1.753 m (5' 9\")   Wt 92.5 kg (204 lb)   LMP 09/29/2006   Breastfeeding No   BMI 30.13 kg/m    Body mass index is 30.13 kg/m .    Exam:  Constitutional:  Appearance: Well nourished, well developed alert, in no acute distress  Psychiatric:  Mentation appears normal and affect normal/bright.  Pelvic Exam:  External Genitalia:     Normal appearance for age, no discharge present, no tenderness present, no inflammatory lesions present, color normal- WHITE EPITHELIUM NOTED FROM LOWER 1/3 LABIA MAJORA DOWN TO RECTUM. WIDEST PART IS NEAR GLUTEAL FOLD 2.5CM. SKIN TEAR AT " VAGINAL OPENING.   Vagina:     Normal vaginal vault without central or paravaginal defects, MODERATE AMOUNT OF THIN WHITE FROTHY discharge present, no inflammatory lesions present, no masses present- MULTIPLE SMALL RED LESIONS AT INTROITUS.  Bladder:     Nontender to palpation  Urethra:   Urethral Body:  Urethra palpation normal, urethra structural support normal   Urethral Meatus:  No erythema or lesions present  Cervix:     Appearance healthy, no lesions present, nontender to palpation, no bleeding present  Uterus:     Uterus: firm, normal sized and nontender, midplane in position.   Adnexa:     No adnexal tenderness present, no adnexal masses present  Perineum:     Perineum within normal limits, no evidence of trauma, no rashes or skin lesions present  Anus:     Anus within normal limits, no hemorrhoids present  Inguinal Lymph Nodes:     No lymphadenopathy present  Pubic Hair:     Normal pubic hair distribution for age  Genitalia and Groin:     No rashes present, no lesions present, no areas of discoloration, no masses present       In-Clinic Test Results:  Results for orders placed or performed in visit on 07/17/20 (from the past 24 hour(s))   Wet prep    Specimen: Vagina   Result Value Ref Range    Specimen Description Vagina     Wet Prep Clue cells seen  Many   (A)     Wet Prep WBC'S seen  Many       Wet Prep No Trichomonas seen     Wet Prep No yeast seen        ASSESSMENT/PLAN:                                                        ICD-10-CM    1. Itching of vagina  N89.8 Wet prep   2. Vaginal lesion  N89.8 HSV 1 and 2 DNA by PCR   3. Lichen sclerosus et atrophicus of the vulva  N90.4 clobetasol (TEMOVATE) 0.05 % external ointment   4. Bacterial vaginosis  N76.0 metroNIDAZOLE (FLAGYL) 500 MG tablet    B96.89        There are no Patient Instructions on file for this visit.    Wet prep+ clue cells. Will treat.   LSEA- try topical steroids for 3 months and RTC for recheck. 2 weeks on 2 weeks off.   Continue  acyclovir      COLETTE Lombardi CNP  [facility] FOR [facility] [place]

## 2020-07-20 LAB
HSV1 DNA SPEC QL NAA+PROBE: NEGATIVE
HSV2 DNA SPEC QL NAA+PROBE: NEGATIVE
SPECIMEN SOURCE: NORMAL

## 2020-07-29 ENCOUNTER — TELEPHONE (OUTPATIENT)
Dept: OBGYN | Facility: CLINIC | Age: 60
End: 2020-07-29

## 2020-07-29 DIAGNOSIS — N76.0 BACTERIAL VAGINOSIS: Primary | ICD-10-CM

## 2020-07-29 DIAGNOSIS — B96.89 BACTERIAL VAGINOSIS: Primary | ICD-10-CM

## 2020-07-29 RX ORDER — METRONIDAZOLE 7.5 MG/G
1 GEL VAGINAL DAILY
Qty: 35 G | Refills: 0 | Status: SHIPPED | OUTPATIENT
Start: 2020-07-29 | End: 2020-08-05

## 2020-07-29 NOTE — TELEPHONE ENCOUNTER
Flagyl doesn't come in a capsule for the dose we need. She can use the vaginal gel if she wishes.  Let me know.

## 2020-07-29 NOTE — TELEPHONE ENCOUNTER
Patient request refill metroNIDAZOLE (FLAGYL) 500 MG tablet - in capsule form if possible. Patient is going out of town tomorrow and was hoping for the refill to be filled today if possible.

## 2020-07-29 NOTE — TELEPHONE ENCOUNTER
Patient informed. Pt verbalized understanding, in agreement with plan, and voiced no further questions.    Niki Mcgowan RN on 7/29/2020 at 1:07 PM

## 2020-07-29 NOTE — TELEPHONE ENCOUNTER
Routing to provider to advise.  See [employer] message from 7/28/20.  Niki Mcgowan RN on 7/29/2020 at 10:35 AM

## 2020-07-29 NOTE — TELEPHONE ENCOUNTER
RX sent for metro gel.       If symptoms persist or return after this round, she does need to come back in for testing.

## 2020-12-01 ENCOUNTER — E-VISIT (OUTPATIENT)
Dept: OBGYN | Facility: CLINIC | Age: 60
End: 2020-12-01
Payer: COMMERCIAL

## 2020-12-01 DIAGNOSIS — Z53.9 ERRONEOUS ENCOUNTER--DISREGARD: Primary | ICD-10-CM

## 2020-12-02 NOTE — PATIENT INSTRUCTIONS
Thank you for choosing us for your care. Based on the information provided, I believe you need to be seen immediately.  Please go urgent care as soon as possible.     You will not be charged for this eVisit.      Thank you for choosing us for your care. Based on your symptoms and length of illness, I do not think that you need a prescription at this time.  Please follow the care advise I've provided and use the over the counter medications to help relieve your symptoms.   View your full visit summary for details by clicking on the link below.     If you're not feeling better within 2-3 days, please respond to this message and we can consider if a prescription is needed.  You can schedule an appointment right here in [employer], or call [phone]  If the visit is for the same symptoms as your e-visit, we'll refund the cost of your e-visit if seen within seven days.      Thank you for choosing us for your care. Based on your symptoms and length of illness, I do not think that you need an antibiotic prescription at this time.  Please follow the care advise I ve provided and use the prescribed medication to help relieve your symptoms. View your full visit summary for details by clicking on the link below.     If you re not feeling better within 5-7 days, please respond to this message and we can consider if an antibiotic prescription is needed.  You can schedule an appointment right here in [employer], or call [phone]  If the visit is for the same symptoms as your e-visit, we ll refund the cost of your e-visit if seen within seven days

## 2020-12-14 ENCOUNTER — ANCILLARY PROCEDURE (OUTPATIENT)
Dept: MAMMOGRAPHY | Facility: CLINIC | Age: 60
End: 2020-12-14
Attending: FAMILY MEDICINE
Payer: COMMERCIAL

## 2020-12-14 DIAGNOSIS — Z12.31 VISIT FOR SCREENING MAMMOGRAM: ICD-10-CM

## 2020-12-14 PROCEDURE — 77063 BREAST TOMOSYNTHESIS BI: CPT | Mod: TC | Performed by: RADIOLOGY

## 2020-12-14 PROCEDURE — 77067 SCR MAMMO BI INCL CAD: CPT | Mod: TC | Performed by: RADIOLOGY

## 2020-12-28 ENCOUNTER — TRANSFERRED RECORDS (OUTPATIENT)
Dept: HEALTH INFORMATION MANAGEMENT | Facility: CLINIC | Age: 60
End: 2020-12-28

## 2021-01-01 ENCOUNTER — HOSPITAL ENCOUNTER (EMERGENCY)
Facility: CLINIC | Age: 61
Discharge: HOME | End: 2021-01-01
Attending: EMERGENCY MEDICINE | Admitting: EMERGENCY MEDICINE
Payer: COMMERCIAL

## 2021-01-01 VITALS
TEMPERATURE: 98.3 F | RESPIRATION RATE: 20 BRPM | SYSTOLIC BLOOD PRESSURE: 151 MMHG | HEART RATE: 64 BPM | OXYGEN SATURATION: 99 % | DIASTOLIC BLOOD PRESSURE: 78 MMHG

## 2021-01-01 DIAGNOSIS — R00.2 PALPITATIONS: ICD-10-CM

## 2021-01-01 DIAGNOSIS — I49.3 PVC'S (PREMATURE VENTRICULAR CONTRACTIONS): ICD-10-CM

## 2021-01-01 LAB
ANION GAP SERPL CALCULATED.3IONS-SCNC: 7 MMOL/L (ref 3–14)
BASOPHILS # BLD AUTO: 0.2 10E9/L (ref 0–0.2)
BASOPHILS NFR BLD AUTO: 1.2 %
BUN SERPL-MCNC: 21 MG/DL (ref 7–30)
CALCIUM SERPL-MCNC: 8.9 MG/DL (ref 8.5–10.1)
CHLORIDE SERPL-SCNC: 109 MMOL/L (ref 94–109)
CO2 SERPL-SCNC: 25 MMOL/L (ref 20–32)
CREAT SERPL-MCNC: 0.76 MG/DL (ref 0.52–1.04)
DIFFERENTIAL METHOD BLD: ABNORMAL
EOSINOPHIL # BLD AUTO: 0.8 10E9/L (ref 0–0.7)
EOSINOPHIL NFR BLD AUTO: 6.8 %
ERYTHROCYTE [DISTWIDTH] IN BLOOD BY AUTOMATED COUNT: 12.9 % (ref 10–15)
GFR SERPL CREATININE-BSD FRML MDRD: 83 ML/MIN/{1.73_M2}
GLUCOSE SERPL-MCNC: 93 MG/DL (ref 70–99)
HCT VFR BLD AUTO: 45.5 % (ref 35–47)
HGB BLD-MCNC: 14.7 G/DL (ref 11.7–15.7)
IMM GRANULOCYTES # BLD: 0 10E9/L (ref 0–0.4)
IMM GRANULOCYTES NFR BLD: 0.3 %
LYMPHOCYTES # BLD AUTO: 4.3 10E9/L (ref 0.8–5.3)
LYMPHOCYTES NFR BLD AUTO: 35.7 %
MAGNESIUM SERPL-MCNC: 1.9 MG/DL (ref 1.6–2.3)
MCH RBC QN AUTO: 30.2 PG (ref 26.5–33)
MCHC RBC AUTO-ENTMCNC: 32.3 G/DL (ref 31.5–36.5)
MCV RBC AUTO: 94 FL (ref 78–100)
MONOCYTES # BLD AUTO: 1.1 10E9/L (ref 0–1.3)
MONOCYTES NFR BLD AUTO: 8.7 %
NEUTROPHILS # BLD AUTO: 5.7 10E9/L (ref 1.6–8.3)
NEUTROPHILS NFR BLD AUTO: 47.3 %
NRBC # BLD AUTO: 0 10*3/UL
NRBC BLD AUTO-RTO: 0 /100
PHOSPHATE SERPL-MCNC: 3.8 MG/DL (ref 2.5–4.5)
PLATELET # BLD AUTO: 401 10E9/L (ref 150–450)
POTASSIUM SERPL-SCNC: 3.6 MMOL/L (ref 3.4–5.3)
RBC # BLD AUTO: 4.86 10E12/L (ref 3.8–5.2)
SODIUM SERPL-SCNC: 141 MMOL/L (ref 133–144)
WBC # BLD AUTO: 12.1 10E9/L (ref 4–11)

## 2021-01-01 PROCEDURE — 84100 ASSAY OF PHOSPHORUS: CPT | Performed by: EMERGENCY MEDICINE

## 2021-01-01 PROCEDURE — 258N000003 HC RX IP 258 OP 636: Performed by: EMERGENCY MEDICINE

## 2021-01-01 PROCEDURE — 99284 EMERGENCY DEPT VISIT MOD MDM: CPT | Mod: 25

## 2021-01-01 PROCEDURE — 96360 HYDRATION IV INFUSION INIT: CPT

## 2021-01-01 PROCEDURE — 80048 BASIC METABOLIC PNL TOTAL CA: CPT | Performed by: EMERGENCY MEDICINE

## 2021-01-01 PROCEDURE — 250N000013 HC RX MED GY IP 250 OP 250 PS 637: Performed by: EMERGENCY MEDICINE

## 2021-01-01 PROCEDURE — 83735 ASSAY OF MAGNESIUM: CPT | Performed by: EMERGENCY MEDICINE

## 2021-01-01 PROCEDURE — 85025 COMPLETE CBC W/AUTO DIFF WBC: CPT | Performed by: EMERGENCY MEDICINE

## 2021-01-01 PROCEDURE — 93005 ELECTROCARDIOGRAM TRACING: CPT

## 2021-01-01 RX ORDER — METOPROLOL SUCCINATE 50 MG/1
50 TABLET, EXTENDED RELEASE ORAL DAILY
Qty: 30 TABLET | Refills: 0 | Status: SHIPPED | OUTPATIENT
Start: 2021-01-01 | End: 2021-02-04

## 2021-01-01 RX ADMIN — METOPROLOL TARTRATE 12.5 MG: 25 TABLET ORAL at 05:44

## 2021-01-01 RX ADMIN — SODIUM CHLORIDE 1000 ML: 9 INJECTION, SOLUTION INTRAVENOUS at 05:09

## 2021-01-01 ASSESSMENT — ENCOUNTER SYMPTOMS
PALPITATIONS: 1
ARTHRALGIAS: 1

## 2021-01-01 NOTE — ED TRIAGE NOTES
Pt states woke up approximately 2 hours pta with palpitations. Pt states seen by Cardiology for similar in January 2020 and diagnosed with PVCs. ABCs intact GCS 15

## 2021-01-01 NOTE — ED AVS SNAPSHOT
[facility]  [address]  [facility] [address]  Phone: [phone]  Fax: [phone]                                    Felisha Gorman   MRN: [id number]    Department: [facility] Emergency Dept   Date of Visit: 1/1/2021           After Visit Summary Signature Page    I have received my discharge instructions, and my questions have been answered. I have discussed any challenges I see with this plan with the nurse or doctor.    ..........................................................................................................................................  Patient/Patient Representative Signature      ..........................................................................................................................................  Patient Representative Print Name and Relationship to Patient    ..................................................               ................................................  Date                                   Time    ..........................................................................................................................................  Reviewed by Signature/Title    ...................................................              ..............................................  Date                                               Time          22EPIC Rev 08/18

## 2021-01-01 NOTE — ED PROVIDER NOTES
History   Chief Complaint:  Palpitations     HPI   Felisha Gorman is a 60 year old female who presents with palpitations. The patient was seen by Cardiology and diagnosed on 1/28/20 with PVC's. She was the started on Metoprolol. Patient reports she woke up a couple of hours ago with some heart palpitations. She attempted to go back to bed and could just feel an irregular beat. She also reports some left shoulder pain when pressure is applied to the area. She reports having a couple of glasses of wine last night. Drank a can of pop this morning and often drinks them throughout the day. Saw cardiologist about a year ago. She also reports having a colonoscopy recently. No chest pain or shortness of breath.  No modifying factors.  Symptoms present for 2 hours and have been constant.    Review of Systems   Cardiovascular: Positive for palpitations.   Musculoskeletal: Positive for arthralgias (left shoulder ).   All other systems reviewed and are negative.        Allergies:  Atorvastatin  Penicillin     Medications:  Toprol-XL  Singulair  Albuterol inhaler  Flovent inhaler    Past Medical History:    Hypertension   Anxiety  Herpes simplex  Asthma  Obstructive sleep apnea   PVC's   Dysthymic disorder    Past Surgical History:    Endometrial ablation  Bilateral breast reduction and tummy tuck  Tonsillectomy  Nasal polyp removal    Family History:    Brother- coronary artery disease  Mother- coronary artery disease, colorectal cancer  Father- MI    Social History:  Presents alone  Alcohol occasionally    Physical Exam     Patient Vitals for the past 24 hrs:   BP Temp Temp src Pulse Resp SpO2   01/01/21 0530 (!) 151/78 -- -- 64 -- 99 %   01/01/21 0515 (!) 161/83 -- -- 67 -- 100 %   01/01/21 0440 (!) 174/112 98.3  F (36.8  C) Oral 79 20 99 %       Physical Exam  I have reviewed the triage vital signs    Eyes: No discharge, symmetrical lids  ENT: Moist mucous membranes, no ear discharge  Neck: Full range of  motion  Respiratory: CTAB, no wheezes  Cardiovascular: Regular rate and rhythm, with occasional irregular beat, no lower extremity edema  Chest: Equal rise  Gastrointestinal: Soft. Nondistended. NTTP. No rebound or guarding  Musculoskeletal: No gross deformities.   Skin: Warm and well perfused. No visible rash.  Neurologic: Moves all extremities, speech fluent without dysarthria  Psychiatric: Appropriate affect, alert and interactive     Emergency Department Course     ECG (04:45:15):  Rate 66 bpm. UT interval 174. QRS duration 102. QT/QTc 436/457. P-R-T axes 44 -42 34. Sinus rhythm with frequent premature ventricular complexes with ventricular escape complexes. Left axis deviation. Abnormal ECG. Interpreted at 0446 by VoParam MD.     Laboratory:  CBC: WBC 12.1 (H), HGB 14.7,   BMP: WNL (Creatinine 0.76)    Magnesium: 1.9  Phosphorus: 3.8    Emergency Department Course:    Reviewed:  I reviewed nursing notes, vitals, past medical history and care everywhere    Assessments:  0443: I obtained history from the patient and performed a physical exam.   0555: I updated the patient on findings and plan for discharge.     Interventions:  0509: NS 1L IV Bolus    0544: Lopressor 12.5 PO    Disposition:  The patient was discharged to home.     Impression & Plan     Medical Decision MakinF h/o PVCs, presenting with palpitations.  DDx includes but is not limited to PVCs, a fib, electrolyte disturbance, SVT  EKG shows PVCs.  Labs reassuring.  Tele monitor observed -- PVCs correlate with pt's symptoms.  No symptoms to suggest ischemia and EKG argues against this.  She has not been taking her metoprolol as prescribed.  She was given a refill for her usual dose of metoprolol.  Advised follow-up with cardiology.  Return precautions given.      Diagnosis:    ICD-10-CM    1. PVC's (premature ventricular contractions)  I49.3    2. Palpitations  R00.2 metoprolol succinate ER (TOPROL-XL) 50 MG 24 hr tablet        Discharge Medications:  Discharge Medication List as of 1/1/2021  6:06 AM          Scribe Disclosure:  I, Isamar Irby, am serving as a scribe at 4:44 AM on 1/1/2021 to document services personally performed by Param Crystal MD based on my observations and the provider's statements to me.             Param Crystal MD  01/01/21 0628

## 2021-01-03 LAB — INTERPRETATION ECG - MUSE: NORMAL

## 2021-01-15 ENCOUNTER — HEALTH MAINTENANCE LETTER (OUTPATIENT)
Age: 61
End: 2021-01-15

## 2021-02-04 DIAGNOSIS — R00.2 PALPITATIONS: ICD-10-CM

## 2021-02-04 RX ORDER — METOPROLOL SUCCINATE 50 MG/1
50 TABLET, EXTENDED RELEASE ORAL DAILY
Qty: 90 TABLET | Refills: 0 | Status: SHIPPED | OUTPATIENT
Start: 2021-02-04 | End: 2021-02-16

## 2021-02-16 ENCOUNTER — VIRTUAL VISIT (OUTPATIENT)
Dept: CARDIOLOGY | Facility: CLINIC | Age: 61
End: 2021-02-16
Payer: COMMERCIAL

## 2021-02-16 DIAGNOSIS — I10 BENIGN ESSENTIAL HYPERTENSION: ICD-10-CM

## 2021-02-16 DIAGNOSIS — E78.5 HYPERLIPIDEMIA LDL GOAL <100: ICD-10-CM

## 2021-02-16 DIAGNOSIS — I49.3 PVC'S (PREMATURE VENTRICULAR CONTRACTIONS): Primary | ICD-10-CM

## 2021-02-16 PROCEDURE — 99213 OFFICE O/P EST LOW 20 MIN: CPT | Mod: 95 | Performed by: INTERNAL MEDICINE

## 2021-02-16 RX ORDER — METOPROLOL TARTRATE 50 MG
50 TABLET ORAL PRN
Qty: 10 TABLET | Refills: 3 | Status: SHIPPED | OUTPATIENT
Start: 2021-02-16 | End: 2021-04-05

## 2021-02-16 NOTE — PROGRESS NOTES
"Review Of Systems  Skin: negative  Eyes: negative  Ears/Nose/Throat: negative  Respiratory: No shortness of breath, dyspnea on exertion, cough, or hemoptysis  Cardiovascular: Lightheaededness  Gastrointestinal: negative  Genitourinary: negative  Musculoskeletal: negative  Neurologic: migraine headaches  Psychiatric: negative  Hematologic/Lymphatic/Immunologic: negative  Endocrine: negative    Vitals - Patient Reported  Systolic (Patient Reported): (pt does not check VS at home)  Weight (Patient Reported): 81.6 kg (180 lb)  Height (Patient Reported): 175.3 cm (5' 9\")  BMI (Based on Pt Reported Ht/Wt): 26.58    Reviewed:  KRISTA Weber  02/16/2021Felisha is a 61 year old who is being evaluated via a billable video visit.      How would you like to obtain your AVS? MyChart  If the video visit is dropped, the invitation should be resent by: Send to e-mail at: [phone]  Will anyone else be joining your video visit? No      Video Start Time: 419p  Video-Visit Details    Type of service:  Video Visit  DOXIMIETY  Video End Time 444pm  Originating Location (pt. Location): Home    Distant Location (provider location):  [facility]     Platform used for Video Visit: [employer]  "

## 2021-02-16 NOTE — LETTER
"2/16/2021    [place]  [address] [facility] [place] [address]    RE: Felisha SUSIE Gorman       Dear Colleague,    I had the pleasure of seeing Felisha Gorman in the [facility].    Review Of Systems  Skin: negative  Eyes: negative  Ears/Nose/Throat: negative  Respiratory: No shortness of breath, dyspnea on exertion, cough, or hemoptysis  Cardiovascular: Lightheaededness  Gastrointestinal: negative  Genitourinary: negative  Musculoskeletal: negative  Neurologic: migraine headaches  Psychiatric: negative  Hematologic/Lymphatic/Immunologic: negative  Endocrine: negative    Vitals - Patient Reported  Systolic (Patient Reported): (pt does not check VS at home)  Weight (Patient Reported): 81.6 kg (180 lb)  Height (Patient Reported): 175.3 cm (5' 9\")  BMI (Based on Pt Reported Ht/Wt): 26.58    Reviewed:  KRISTA Weber  02/16/2021Felisha is a 61 year old who is being evaluated via a billable video visit.      How would you like to obtain your AVS? MyChart  If the video visit is dropped, the invitation should be resent by: Send to e-mail at: [phone]  Will anyone else be joining your video visit? No      Video Start Time: 419p  Video-Visit Details    Type of service:  Video Visit  DOXIMIETY  Video End Time 444pm  Originating Location (pt. Location): Home    Distant Location (provider location):  [facility] [place]     Platform used for Video Visit: [employer]    Service Date: 02/16/2021      HISTORY OF PRESENT ILLNESS:  This is a [employer] video office visit from 4:19 p.m. to 4:44 p.m. for 24 minutes with Felisha Sherif.  I saw her 1 year ago.  She has a history of hypertension.  She was on losartan at one point, but she discontinued it.  We had actually recommended metoprolol XL to treat both her PVCs and blood pressure, but she declined taking it and instead had quite an impressive weight loss with diet.  She has lost 50 pounds and changed her " eating habits.  Of note, she had had a colonoscopy at the very end of 2020 and then on 01/01/2021 she was a little dehydrated, had some alcohol, had some caffeine and had some dark chocolate and ended up going to the emergency room, where she was noted to have very frequent unifocal PVCs.  She was discharged from there, but she was noted to be hypertensive then also.  She states other than that day, most of the time the PVCs are much better.  We think these are likely RVOT PVCs, since they have a positive axis in the inferior leads and have a left bundle configuration.  She had a remote nuclear stress test in 2005 which was negative for ischemia.  Of note, however, there was some attenuation on that stress test, so it lost a little bit of accuracy.  It was probably from breast tissue.  Of note, she has lost weight and she also had a breast reduction since that nuclear test.  She also had an echocardiogram about a year ago or more and that showed structurally normal heart.  Her underlying EKG shows sinus rhythm with left axis deviation, and they did capture PVCs when she was in the emergency room, again, showing left bundle and positive axis inferiorly.  She was supposed to have gotten a CT coronary angiogram to follow up to make sure that her palpitations and her chest pain was all benign and not represent ischemia, since she has risk factors including early family history, hypertension and hyperlipidemia.  She declined that because she was working at the time.  I am going to go ahead and order a nuclear stress test.  I did explain to her if the test comes back normal, it does not mean that there is no blockage, it only means that there is no severe blockage.  We will see what her cholesterol numbers are.  We would recommend therapy given her risk factors, and we will be able to see what her blood pressure numbers are on the treadmill.  I have encouraged her to perhaps either buy a blood pressure machine or borrow  one so we can see what her blood pressure numbers are on other days.  If we can clear her for everything, I will turn her care over to Dr. Aubrie Antoine, but I would like to make sure that there is no ischemia at this point, try to get the blood pressure controlled with medication, or at least started if indeed we see hypertension, and the same with cholesterol.  She is hoping that with her diet her cholesterol numbers will be better.        Hever Huggins MD      cc:   [place] [address]         DANIEL HUGGINS MD             D: 2021   T: 2021   MT: MACK      Name:     ISAAC JORGENSEN   MRN:      [id number]        Account:      [id number]   :      1960           Service Date: 2021      Document: [id number]        Thank you for allowing me to participate in the care of your patient.    Sincerely,     Daniel Huggins MD     [facility]

## 2021-02-16 NOTE — PROGRESS NOTES
Service Date: 02/16/2021      HISTORY OF PRESENT ILLNESS:  This is a [facility] video office visit from 4:19 p.m. to 4:44 p.m. for 24 minutes with Felisha Gorman.  I saw her 1 year ago.  She has a history of hypertension.  She was on losartan at one point, but she discontinued it.  We had actually recommended metoprolol XL to treat both her PVCs and blood pressure, but she declined taking it and instead had quite an impressive weight loss with diet.  She has lost 50 pounds and changed her eating habits.  Of note, she had had a colonoscopy at the very end of 2020 and then on 01/01/2021 she was a little dehydrated, had some alcohol, had some caffeine and had some dark chocolate and ended up going to the emergency room, where she was noted to have very frequent unifocal PVCs.  She was discharged from there, but she was noted to be hypertensive then also.  She states other than that day, most of the time the PVCs are much better.  We think these are likely RVOT PVCs, since they have a positive axis in the inferior leads and have a left bundle configuration.  She had a remote nuclear stress test in 2005 which was negative for ischemia.  Of note, however, there was some attenuation on that stress test, so it lost a little bit of accuracy.  It was probably from breast tissue.  Of note, she has lost weight and she also had a breast reduction since that nuclear test.  She also had an echocardiogram about a year ago or more and that showed structurally normal heart.  Her underlying EKG shows sinus rhythm with left axis deviation, and they did capture PVCs when she was in the emergency room, again, showing left bundle and positive axis inferiorly.  She was supposed to have gotten a CT coronary angiogram to follow up to make sure that her palpitations and her chest pain was all benign and not represent ischemia, since she has risk factors including early family history, hypertension and hyperlipidemia.  She declined that because  she was working at the time.  I am going to go ahead and order a nuclear stress test.  I did explain to her if the test comes back normal, it does not mean that there is no blockage, it only means that there is no severe blockage.  We will see what her cholesterol numbers are.  We would recommend therapy given her risk factors, and we will be able to see what her blood pressure numbers are on the treadmill.  I have encouraged her to perhaps either buy a blood pressure machine or borrow one so we can see what her blood pressure numbers are on other days.  If we can clear her for everything, I will turn her care over to Dr. Aubrie Antoine, but I would like to make sure that there is no ischemia at this point, try to get the blood pressure controlled with medication, or at least started if indeed we see hypertension, and the same with cholesterol.  She is hoping that with her diet her cholesterol numbers will be better.        Hever Huggins MD      cc:   [place] [address]         VINAY HUGGINS MD             D: 2021   T: 2021   MT: MACK      Name:     ISAAC JORGENSEN   MRN:      [id number]        Account:      [id number]   :      1960           Service Date: 2021      Document: [id number]

## 2021-04-05 ENCOUNTER — OFFICE VISIT (OUTPATIENT)
Dept: FAMILY MEDICINE | Facility: CLINIC | Age: 61
End: 2021-04-05
Payer: COMMERCIAL

## 2021-04-05 VITALS
DIASTOLIC BLOOD PRESSURE: 98 MMHG | SYSTOLIC BLOOD PRESSURE: 130 MMHG | WEIGHT: 190 LBS | OXYGEN SATURATION: 95 % | BODY MASS INDEX: 28.06 KG/M2 | RESPIRATION RATE: 12 BRPM | HEART RATE: 67 BPM | TEMPERATURE: 98.1 F

## 2021-04-05 DIAGNOSIS — N76.4 ABSCESS OF RIGHT GENITAL LABIA: Primary | ICD-10-CM

## 2021-04-05 DIAGNOSIS — I10 HYPERTENSION GOAL BP (BLOOD PRESSURE) < 140/90: ICD-10-CM

## 2021-04-05 DIAGNOSIS — I49.3 PVC'S (PREMATURE VENTRICULAR CONTRACTIONS): ICD-10-CM

## 2021-04-05 PROCEDURE — 99214 OFFICE O/P EST MOD 30 MIN: CPT | Performed by: PHYSICIAN ASSISTANT

## 2021-04-05 RX ORDER — METOPROLOL TARTRATE 50 MG
50 TABLET ORAL PRN
Qty: 10 TABLET | Refills: 3 | Status: SHIPPED | OUTPATIENT
Start: 2021-04-05 | End: 2022-08-10

## 2021-04-05 RX ORDER — SULFAMETHOXAZOLE AND TRIMETHOPRIM 200; 40 MG/5ML; MG/5ML
160 SUSPENSION ORAL 2 TIMES DAILY
Qty: 280 ML | Refills: 0 | Status: SHIPPED | OUTPATIENT
Start: 2021-04-05 | End: 2021-04-12

## 2021-04-05 RX ORDER — LOSARTAN POTASSIUM 50 MG/1
50 TABLET ORAL DAILY
Qty: 90 TABLET | Refills: 1 | Status: SHIPPED | OUTPATIENT
Start: 2021-04-05 | End: 2022-08-10

## 2021-04-05 NOTE — PROGRESS NOTES
Assessment & Plan     Abscess of right genital labia    Treat with bactrim. Continue warm compresses and baths. Follow-up with dermatology or possibly OBGYN to discuss removal of a possible underlying cyst since it has recurred.    - sulfamethoxazole-trimethoprim (BACTRIM/SEPTRA) 8 mg/mL suspension; Take 20 mLs (160 mg) by mouth 2 times daily for 7 days  - DERMATOLOGY ADULT REFERRAL; Future      PVC's (premature ventricular contractions)    Refilled metoprolol. Stable. Gets about 5 episodes per year.    - metoprolol tartrate (LOPRESSOR) 50 MG tablet; Take 1 tablet (50 mg) by mouth as needed (palpitations)      Hypertension goal BP (blood pressure) < 140/90    Uncontrolled. Not currently taking medication. Sent in losartan and metoprolol.     - metoprolol tartrate (LOPRESSOR) 50 MG tablet; Take 1 tablet (50 mg) by mouth as needed (palpitations)  - losartan (COZAAR) 50 MG tablet; Take 1 tablet (50 mg) by mouth daily               There are no Patient Instructions on file for this visit.    No follow-ups on file.    ROMEL Bedolla [facility]    Kaila Baeza is a 61 year old who presents for the following health issues   HPI     Concern - Vaginal cyst or boil  Onset: week  Description: on the rt side of labia, did rupture came to a head (thick, white drainage with some blood), was very painful , pain would radiate in the leg  Intensity: moderate  Progression of Symptoms: improving but getting a little worse today  Accompanying Signs & Symptoms: tender to the touch when larger   Previous history of similar problem: has had last year same spot and same thing, did leave a little bump never completely went away wants to know should get removed?    Therapies tried and outcome: warm compresses and baths, trying to keep clean      Review of Systems   Constitutional, HEENT, cardiovascular, pulmonary, gi and gu systems are negative, except as otherwise noted.        Objective    BP  (!) 130/98 (BP Location: Right arm, Patient Position: Chair, Cuff Size: Adult Regular)   Pulse 67   Temp 98.1  F (36.7  C) (Oral)   Resp 12   Wt 86.2 kg (190 lb)   LMP 09/29/2006   SpO2 95%   BMI 28.06 kg/m    Body mass index is 28.06 kg/m .       Physical Exam   GENERAL: healthy, alert and no distress  EYES: Eyes grossly normal to inspection, PERRL and conjunctivae and sclerae normal   (female): normal female external genitalia and erythema of right labia. Evidence of previous drainage but no current drainage. Tender to palpation and mildly firm to palpation.  MS: no gross musculoskeletal defects noted, no edema  SKIN: no suspicious lesions or rashes  NEURO: Normal strength and tone, mentation intact and speech normal  PSYCH: mentation appears normal, affect normal/bright

## 2021-04-09 ENCOUNTER — TELEPHONE (OUTPATIENT)
Dept: FAMILY MEDICINE | Facility: CLINIC | Age: 61
End: 2021-04-09

## 2021-04-09 ENCOUNTER — TRANSFERRED RECORDS (OUTPATIENT)
Dept: HEALTH INFORMATION MANAGEMENT | Facility: CLINIC | Age: 61
End: 2021-04-09

## 2021-04-09 LAB
HPV ABSTRACT: NORMAL
PAP SMEAR - HIM PATIENT REPORTED: NEGATIVE

## 2021-04-09 NOTE — TELEPHONE ENCOUNTER
Received call from [facility] Jennifer L. Schwab, M.D. requesting OV notes from patient's recent visit w/ A.B. in clinic regarding abscess of right genital labia. Patient at appointment w/ Dr. Schwab now and gave verbal ok for RN to fax OV notes. Faxed OV notes to [phone].     Montrell CHEN RN

## 2021-04-30 ENCOUNTER — OFFICE VISIT (OUTPATIENT)
Dept: FAMILY MEDICINE | Facility: CLINIC | Age: 61
End: 2021-04-30
Payer: COMMERCIAL

## 2021-04-30 VITALS
OXYGEN SATURATION: 97 % | TEMPERATURE: 97.7 F | WEIGHT: 188.8 LBS | HEART RATE: 64 BPM | SYSTOLIC BLOOD PRESSURE: 144 MMHG | BODY MASS INDEX: 27.96 KG/M2 | HEIGHT: 69 IN | RESPIRATION RATE: 16 BRPM | DIASTOLIC BLOOD PRESSURE: 88 MMHG

## 2021-04-30 DIAGNOSIS — Z81.8 FAMILY HISTORY OF DEMENTIA: ICD-10-CM

## 2021-04-30 DIAGNOSIS — R41.3 MEMORY CHANGE: ICD-10-CM

## 2021-04-30 DIAGNOSIS — F33.41 RECURRENT MAJOR DEPRESSIVE DISORDER, IN PARTIAL REMISSION (H): ICD-10-CM

## 2021-04-30 DIAGNOSIS — I10 HYPERTENSION GOAL BP (BLOOD PRESSURE) < 140/90: ICD-10-CM

## 2021-04-30 DIAGNOSIS — Z13.6 CARDIOVASCULAR SCREENING; LDL GOAL LESS THAN 160: ICD-10-CM

## 2021-04-30 DIAGNOSIS — N95.8 GENITOURINARY SYNDROME OF MENOPAUSE: ICD-10-CM

## 2021-04-30 DIAGNOSIS — G47.33 OBSTRUCTIVE SLEEP APNEA: ICD-10-CM

## 2021-04-30 DIAGNOSIS — R73.01 IMPAIRED FASTING GLUCOSE: ICD-10-CM

## 2021-04-30 DIAGNOSIS — Z11.59 NEED FOR HEPATITIS C SCREENING TEST: ICD-10-CM

## 2021-04-30 DIAGNOSIS — N89.8 VAGINAL ODOR: ICD-10-CM

## 2021-04-30 DIAGNOSIS — B96.89 BACTERIAL VAGINOSIS: ICD-10-CM

## 2021-04-30 DIAGNOSIS — Z00.00 ROUTINE GENERAL MEDICAL EXAMINATION AT A HEALTH CARE FACILITY: Primary | ICD-10-CM

## 2021-04-30 DIAGNOSIS — R00.2 PALPITATIONS: ICD-10-CM

## 2021-04-30 DIAGNOSIS — Z11.4 SCREENING FOR HIV (HUMAN IMMUNODEFICIENCY VIRUS): ICD-10-CM

## 2021-04-30 DIAGNOSIS — J45.20 MILD INTERMITTENT ASTHMA WITHOUT COMPLICATION: ICD-10-CM

## 2021-04-30 DIAGNOSIS — R42 DIZZINESS: ICD-10-CM

## 2021-04-30 DIAGNOSIS — F41.1 ANXIETY STATE: ICD-10-CM

## 2021-04-30 DIAGNOSIS — N76.0 BACTERIAL VAGINOSIS: ICD-10-CM

## 2021-04-30 LAB
CHOLEST SERPL-MCNC: 220 MG/DL
GLUCOSE SERPL-MCNC: 84 MG/DL (ref 70–99)
HBA1C MFR BLD: 5.2 % (ref 0–5.6)
HDLC SERPL-MCNC: 45 MG/DL
LDLC SERPL CALC-MCNC: 145 MG/DL
NONHDLC SERPL-MCNC: 175 MG/DL
SPECIMEN SOURCE: NORMAL
TRIGL SERPL-MCNC: 152 MG/DL
TSH SERPL DL<=0.005 MIU/L-ACNC: 2.2 MU/L (ref 0.4–4)
VIT B12 SERPL-MCNC: 432 PG/ML (ref 193–986)
WET PREP SPEC: NORMAL

## 2021-04-30 PROCEDURE — 96127 BRIEF EMOTIONAL/BEHAV ASSMT: CPT | Performed by: FAMILY MEDICINE

## 2021-04-30 PROCEDURE — 36415 COLL VENOUS BLD VENIPUNCTURE: CPT | Performed by: FAMILY MEDICINE

## 2021-04-30 PROCEDURE — 80061 LIPID PANEL: CPT | Performed by: FAMILY MEDICINE

## 2021-04-30 PROCEDURE — 99396 PREV VISIT EST AGE 40-64: CPT | Performed by: FAMILY MEDICINE

## 2021-04-30 PROCEDURE — 87210 SMEAR WET MOUNT SALINE/INK: CPT | Performed by: FAMILY MEDICINE

## 2021-04-30 PROCEDURE — 83036 HEMOGLOBIN GLYCOSYLATED A1C: CPT | Performed by: FAMILY MEDICINE

## 2021-04-30 PROCEDURE — 84443 ASSAY THYROID STIM HORMONE: CPT | Performed by: FAMILY MEDICINE

## 2021-04-30 PROCEDURE — 82947 ASSAY GLUCOSE BLOOD QUANT: CPT | Performed by: FAMILY MEDICINE

## 2021-04-30 PROCEDURE — 82607 VITAMIN B-12: CPT | Performed by: FAMILY MEDICINE

## 2021-04-30 PROCEDURE — 99213 OFFICE O/P EST LOW 20 MIN: CPT | Mod: 25 | Performed by: FAMILY MEDICINE

## 2021-04-30 RX ORDER — METRONIDAZOLE 500 MG/1
500 TABLET ORAL 2 TIMES DAILY
Qty: 14 TABLET | Refills: 0 | Status: SHIPPED | OUTPATIENT
Start: 2021-04-30 | End: 2021-05-07

## 2021-04-30 ASSESSMENT — ENCOUNTER SYMPTOMS
FREQUENCY: 1
EYE PAIN: 0
ARTHRALGIAS: 0
JOINT SWELLING: 0
MYALGIAS: 0
DYSURIA: 0
CONSTIPATION: 0
NAUSEA: 0
HEADACHES: 0
COUGH: 0
SORE THROAT: 0
DIZZINESS: 1
WEAKNESS: 0
HEARTBURN: 0
ABDOMINAL PAIN: 0
DIARRHEA: 0
SHORTNESS OF BREATH: 0
HEMATURIA: 0
FEVER: 0
CHILLS: 0
PARESTHESIAS: 0
HEMATOCHEZIA: 0
NERVOUS/ANXIOUS: 0
PALPITATIONS: 0

## 2021-04-30 ASSESSMENT — PAIN SCALES - GENERAL: PAINLEVEL: NO PAIN (0)

## 2021-04-30 ASSESSMENT — ANXIETY QUESTIONNAIRES
6. BECOMING EASILY ANNOYED OR IRRITABLE: NOT AT ALL
GAD7 TOTAL SCORE: 0
5. BEING SO RESTLESS THAT IT IS HARD TO SIT STILL: NOT AT ALL
2. NOT BEING ABLE TO STOP OR CONTROL WORRYING: NOT AT ALL
IF YOU CHECKED OFF ANY PROBLEMS ON THIS QUESTIONNAIRE, HOW DIFFICULT HAVE THESE PROBLEMS MADE IT FOR YOU TO DO YOUR WORK, TAKE CARE OF THINGS AT HOME, OR GET ALONG WITH OTHER PEOPLE: NOT DIFFICULT AT ALL
7. FEELING AFRAID AS IF SOMETHING AWFUL MIGHT HAPPEN: NOT AT ALL
1. FEELING NERVOUS, ANXIOUS, OR ON EDGE: NOT AT ALL
3. WORRYING TOO MUCH ABOUT DIFFERENT THINGS: NOT AT ALL

## 2021-04-30 ASSESSMENT — MIFFLIN-ST. JEOR: SCORE: 1475.77

## 2021-04-30 ASSESSMENT — PATIENT HEALTH QUESTIONNAIRE - PHQ9
5. POOR APPETITE OR OVEREATING: NOT AT ALL
SUM OF ALL RESPONSES TO PHQ QUESTIONS 1-9: 0

## 2021-04-30 NOTE — PROGRESS NOTES
SUBJECTIVE:   CC: Felisha Gorman is an 61 year old woman who presents for preventive health visit.       Patient has been advised of split billing requirements and indicates understanding: Yes  Healthy Habits:     Getting at least 3 servings of Calcium per day:  Yes    Bi-annual eye exam:  Yes    Dental care twice a year:  Yes    Sleep apnea or symptoms of sleep apnea:  Daytime drowsiness    Diet:  Carbohydrate counting    Frequency of exercise:  4-5 days/week    Duration of exercise:  30-45 minutes    Taking medications regularly:  No    Barriers to taking medications:  None    Medication side effects:  None    PHQ-2 Total Score: 0    Additional concerns today:  Yes          Would like to be screened for vaginitis.  Has a fishy odor.  Would like to discuss dizziness after getting out of bed.    Today's PHQ-2 Score:   PHQ-2 (  [employer]) 2021   Q1: Little interest or pleasure in doing things 0   Q2: Feeling down, depressed or hopeless 0   PHQ-2 Score 0   Q1: Little interest or pleasure in doing things Not at all   Q2: Feeling down, depressed or hopeless Not at all   PHQ-2 Score 0       Abuse: Current or Past (Physical, Sexual or Emotional) - No  Do you feel safe in your environment? Yes        Social History     Tobacco Use     Smoking status: Former Smoker     Packs/day: 2.00     Years: 10.00     Pack years: 20.00     Types: Cigarettes     Quit date: 1986     Years since quittin.3     Smokeless tobacco: Never Used   Substance Use Topics     Alcohol use: No     Alcohol/week: 0.0 - 0.8 standard drinks         Alcohol Use 2021   Prescreen: >3 drinks/day or >7 drinks/week? No   Prescreen: >3 drinks/day or >7 drinks/week? -       Reviewed orders with patient.  Reviewed health maintenance and updated orders accordingly - Yes      Breast Cancer Screening:    FSH-7:   Breast CA Risk Assessment (FHS-7) 2021   Did any of your first-degree relatives have breast or ovarian cancer? Yes   Did any  of your relatives have bilateral breast cancer? No   Did any man in your family have breast cancer? No   Did any woman in your family have breast and ovarian cancer? Yes   Did any woman in your family have breast cancer before age 50 y? No   Do you have 2 or more relatives with breast and/or ovarian cancer? No   Do you have 2 or more relatives with breast and/or bowel cancer? No         Pertinent mammograms are reviewed under the imaging tab.    History of abnormal Pap smear: NO - age 30-65 PAP every 5 years with negative HPV co-testing recommended  PAP / HPV 4/20/2012   PAP NIL     Reviewed and updated as needed this visit by clinical staff                 Reviewed and updated as needed this visit by Provider                Patient Active Problem List   Diagnosis     Allergic state     Dysthymic disorder     Mild intermittent asthma     Hypertension goal BP (blood pressure) < 140/90     Obstructive sleep apnea     Anxiety state     IMPAIRED FASTING GLUCOSE     Major depression in complete remission (H)     CARDIOVASCULAR SCREENING; LDL GOAL LESS THAN 160     Family history of ovarian cancer     Knee pain     Osteoarthritis, knee     Abnormal finding on MRI of brain     Radicular low back pain     Recurrent major depressive disorder, in partial remission (H)     Non morbid obesity, unspecified obesity type       Past Medical History:   Diagnosis Date     Allergy, unspecified not elsewhere classified      Dysthymic disorder      Essential hypertension, benign      flight anxiety      Frequent BRONCHITIS     usually in winter     HSV (herpes simplex virus) anogenital infection      Impaired fasting glucose 9/23/2007    Glucose 101 9/07     Left anterior hemiblock      Mild intermittent asthma     URI induced     Obstructive sleep apnea (adult) (pediatric)     not on CPAP; feels rested 2014     Other and unspecified hyperlipidemia      Palpitations     PVC and PVC bigmeny     Rheumatic fever without mention of heart  involvement     no sequelae     transient neurologic sx     ? TIA; had MRI with temporal lobe lesion that is getting smaller       Past Surgical History:   Procedure Laterality Date     COLONOSCOPY  2009    hyperplastic polyp; repeat 10 years     COLONOSCOPY  2020    repeat 5 years; tubular adenoma     SURGICAL HISTORY OF -   2006    endometrial ablation     SURGICAL HISTORY OF -   15    bilateral breast reduction and tummy tuck     TONSILLECTOMY       ZZC NONSPECIFIC PROCEDURE  2002    nasal polyp removed       OB History    Para Term  AB Living   1 1 1 0 0 1   SAB TAB Ectopic Multiple Live Births   0 0 0 0 0      # Outcome Date GA Lbr Gregory/2nd Weight Sex Delivery Anes PTL Lv   1 Term                Current Outpatient Medications   Medication Sig Dispense Refill     acyclovir (ZOVIRAX) 400 MG tablet TAKE 1 TABLET(400 MG) BY MOUTH THREE TIMES DAILY FOR 7 DAYS 21 tablet 1     albuterol (VENTOLIN HFA) 108 (90 Base) MCG/ACT inhaler Inhale 2 puffs into the lungs every 6 hours 18 g 3     fluticasone (FLOVENT HFA) 220 MCG/ACT inhaler Inhale 2 puffs into the lungs 2 times daily 2 puffs bid and rinse and spit after use 1 Inhaler 3     ALPRAZolam (XANAX) 0.5 MG tablet Take 1 tablet (0.5 mg) by mouth 3 times daily as needed for anxiety (Patient not taking: Reported on 2021) 20 tablet 0     clobetasol (TEMOVATE) 0.05 % external ointment Apply topically 2 times daily . 2 weeks on, 2 weeks off. External genitalia only. (Patient not taking: Reported on 2021) 60 g 1     losartan (COZAAR) 50 MG tablet Take 1 tablet (50 mg) by mouth daily (Patient not taking: Reported on 2021) 90 tablet 1     metoprolol tartrate (LOPRESSOR) 50 MG tablet Take 1 tablet (50 mg) by mouth as needed (palpitations) (Patient not taking: Reported on 2021) 10 tablet 3   not currently taking her bp medication.      Family History   Problem Relation Age of Onset     C.A.D. Brother         age 50     Heart  Disease Brother      C.A.ARLYN. Mother      Heart Disease Mother      Cancer Mother         ovarian OK now     Cancer - colorectal Mother         cancerous polyp     C.A.D. Father         MI     Heart Disease Father        Social History     Tobacco Use     Smoking status: Former Smoker     Packs/day: 2.00     Years: 10.00     Pack years: 20.00     Types: Cigarettes     Quit date: 1986     Years since quittin.3     Smokeless tobacco: Never Used   Substance Use Topics     Alcohol use: Yes     Alcohol/week: 0.0 - 0.8 standard drinks     Comment: 1 glass of wine every other week        Immunization History   Administered Date(s) Administered     COVID-19,PF,Pfizer 2021, 2021     Influenza (IIV3) PF 11/10/2010, 10/30/2012, 10/01/2013     Influenza Vaccine IM > 6 months Valent IIV4 10/03/2017     Influenza Vaccine, 6+MO IM (QUADRIVALENT W/PRESERVATIVES) 2019     TD (ADULT, 7+) 2002     TDAP Vaccine (Adacel) 2012     Zoster vaccine recombinant adjuvanted (SHINGRIX) 03/10/2021         Review of Systems   Constitutional: Negative for chills and fever.   HENT: Negative for congestion, ear pain, hearing loss and sore throat.    Eyes: Negative for pain and visual disturbance.   Respiratory: Negative for cough and shortness of breath.    Cardiovascular: Negative for chest pain, palpitations and peripheral edema.   Gastrointestinal: Negative for abdominal pain, constipation, diarrhea, heartburn, hematochezia and nausea.   Genitourinary: Positive for frequency. Negative for dysuria, genital sores, hematuria and urgency.   Musculoskeletal: Negative for arthralgias, joint swelling and myalgias.   Skin: Negative for rash.   Neurological: Positive for dizziness. Negative for weakness, headaches and paresthesias.   Psychiatric/Behavioral: Negative for mood changes. The patient is not nervous/anxious.      Notes her bp was checked elsewhere.   Does report it was high at beginning of  "colonoscopy.  Has seen Dr. Freeman. He started the losartan; she is wondering what I think of the medication.    She does use metoprolol prn.  Rapid HR after alcohol or dark chocolate. This has been diagnosed as PVCs.   She notes the metoprolol takes awhile to kick in.   Typically will take it once q 2 weeks.     Has lost weight since her original sleep apnea dx. Down 50 pounds during COVID.   Less tired when weight down.   Will take a short nap in the afternoon. Feeling better overall.  Believes the cause of her fatigue is nocturia 2-3. Does get back to sleep.   Drinks Diet Coke during the day.   Rarely wakes up snoring; used to a lot.     Family history of dementia.  Will forget a few things at times.   Nothing she currently feels is significant, but wondering if there is something she could take to avoid dementia.    Sexually active.  Does note vaginal dryness that she feels is typical. But recently has been noting an odor. This is a fishy odor that comes out p intercourse and seems to linger for a couple days despite cleaning, etc.   No itching or burning    Dizzy when first gets up. Neck is also a little sore. Dizziness is described as more of a leaning to the right when she walks. Denies spinning sensation.    Sleeps on right.   Few weeks; not everyday, but most.   Once she is up for a few minutes, she is ok.       OBJECTIVE:   BP (!) 148/78 (BP Location: Right arm, Cuff Size: Adult Regular)   Pulse 64   Temp 97.7  F (36.5  C) (Oral)   Resp 16   Ht 1.753 m (5' 9\")   Wt 85.6 kg (188 lb 12.8 oz)   LMP 09/29/2006   SpO2 97%   BMI 27.88 kg/m       BP (!) 144/88 upon repeat.    Physical Exam  GENERAL APPEARANCE: healthy, alert and no distress  EYES: Eyes grossly normal to inspection, PERRL and conjunctivae and sclerae normal  HENT: ear canals and TM's normal, nose and mouth without ulcers or lesions, oropharynx clear and oral mucous membranes moist  NECK: no adenopathy, no asymmetry, masses, or scars and " thyroid normal to palpation  RESP: lungs clear to auscultation - no rales, rhonchi or wheezes  BREAST: normal without masses, tenderness or nipple discharge and no palpable axillary masses or adenopathy  CV: regular rates and rhythm and no peripheral edema  ABDOMEN: soft, nontender, no hepatosplenomegaly, no masses and bowel sounds normal   (female): normal female external genitalia, normal urethral meatus, vaginal mucosal atrophy noted  MS: no musculoskeletal defects are noted and gait is age appropriate without ataxia  SKIN: no suspicious lesions or rashes  NEURO: Normal strength and tone, sensory exam grossly normal, mentation intact and speech normal  PSYCH: mentation appears normal and affect normal/bright    Lab Results   Component Value Date    A1C 5.8 04/12/2018    A1C 5.9 04/07/2017    A1C 6.5 12/01/2015    A1C 6.0 03/25/2014    A1C 6.0 08/19/2011     PHQ-9 SCORE 6/12/2019 12/10/2019 4/30/2021   PHQ-9 Total Score - - -   PHQ-9 Total Score MyChart - - -   PHQ-9 Total Score 15 9 0       BOBBY-7 SCORE 3/27/2019 12/10/2019 4/30/2021   Total Score - - -   Total Score - - -   Total Score 5 9 0       ACT Total Scores 3/27/2019   ACT TOTAL SCORE -   ASTHMA ER VISITS -   ASTHMA HOSPITALIZATIONS -   ACT TOTAL SCORE (Goal Greater than or Equal to 20) 24   In the past 12 months, how many times did you visit the emergency room for your asthma without being admitted to the hospital? 0   In the past 12 months, how many times were you hospitalized overnight because of your asthma? 0             ASSESSMENT/PLAN:   1. Routine general medical examination at a health care facility  She has had recent pap; getting records. Reviewed other screenings.     2. Hypertension goal BP (blood pressure) < 140/90  Has been elevated. Discussed I usually will use lisinopril first , but frequently go to losartan next (both are considered first line medications). Discussed some rationale and potential side effects; and she will go ahead and  try the losartan. Also discussed metoprolol.     3. Recurrent major depressive disorder, in partial remission (H)  Doing well.  Reviewed scores.     4. Memory change  Discussed. Some mild memory changes can potentially go with menopause, distraction, busy life. Did discuss being low on thyroid or B12 are easily reversible; will check this. If things are more of a concern in the future, could consider a Neurology evaluation. Discussed neuropsych testing.   - Vitamin B12  - TSH with free T4 reflex    5. Family history of dementia  As above.   - Vitamin B12  - TSH with free T4 reflex    6. Vaginal odor  Wet prep was negative. Her history is very suggestive of BV. After discussion, we did decide to go ahead with trial of metronidazole. Discussed avoiding alcohol while she is taking this.   - Wet prep    7. Bacterial vaginosis  As above.   - metroNIDAZOLE (FLAGYL) 500 MG tablet; Take 1 tablet (500 mg) by mouth 2 times daily for 7 days  Dispense: 14 tablet; Refill: 0    8. Genitourinary syndrome of menopause  Given patient education materials from [facility]     9. Palpitations  She is interested in trying an alternative as the metoprolol takes awhile to kick in. Will give this a try.   - propranolol (INDERAL) 10 MG tablet; Take 1-2 tablets (10-20 mg) by mouth daily as needed (palpitations)  Dispense: 30 tablet; Refill: 0    10. Obstructive sleep apnea  Would have low threshhold for rechecking a sleep study, especially if fatigued. We did discuss weight makes a big difference with this as well.     11. Anxiety state  Doing well.     12. Dizziness  Uncertain etiology. At this time, discuss getting up slowly and hydrating.   Though I don't know why she would lean more to the right. She will continue to follow for awhile and let me know if persists; consider referral to Neurology.     13. Mild intermittent asthma without complication  Doing well.    14. Impaired fasting glucose  She is successfully losing weight.   -  "Hemoglobin A1c  - Glucose    15. CARDIOVASCULAR SCREENING; LDL GOAL LESS THAN 160    - Lipid panel reflex to direct LDL Fasting    16. Screening for HIV (human immunodeficiency virus)  Discussed. Ok to get next blood draw.    17. Need for hepatitis C screening test  As above.    Patient has been advised of split billing requirements and indicates understanding: as above.  COUNSELING:  Reviewed preventive health counseling, as reflected in patient instructions       Regular exercise       Healthy diet/nutrition    Estimated body mass index is 27.88 kg/m  as calculated from the following:    Height as of this encounter: 1.753 m (5' 9\").    Weight as of this encounter: 85.6 kg (188 lb 12.8 oz).    Weight management plan: Discussed healthy diet and exercise guidelines    She reports that she quit smoking about 34 years ago. Her smoking use included cigarettes. She has a 20.00 pack-year smoking history. She has never used smokeless tobacco.      Counseling Resources:  ATP IV Guidelines  Pooled Cohorts Equation Calculator  Breast Cancer Risk Calculator  BRCA-Related Cancer Risk Assessment: FHS-7 Tool  FRAX Risk Assessment  ICSI Preventive Guidelines  Dietary Guidelines for Americans, 2010  USDA's MyPlate  ASA Prophylaxis  Lung CA Screening    Aubrie Antoine MD, MD  [facility]  "

## 2021-04-30 NOTE — LETTER
My Asthma Action Plan    Name: Felisha Gorman   YOB: 1960  Date: 4/30/2021   My doctor: Aubrie Antoine MD, MD   My clinic: [facility]        My Rescue Medicine:   Albuterol inhaler (Proair/Ventolin/Proventil HFA)  2-4 puffs EVERY 4 HOURS as needed. Use a spacer if recommended by your provider.   My Asthma Severity:   Intermittent / Exercise Induced  Know your asthma triggers: pollens and allergies             GREEN ZONE   Good Control    I feel good    No cough or wheeze    Can work, sleep and play without asthma symptoms       Take your asthma control medicine every day.     1. If exercise triggers your asthma, take your rescue medication    15 minutes before exercise or sports, and    During exercise if you have asthma symptoms  2. Spacer to use with inhaler: If you have a spacer, make sure to use it with your inhaler             YELLOW ZONE Getting Worse  I have ANY of these:    I do not feel good    Cough or wheeze    Chest feels tight    Wake up at night   1. Keep taking your Green Zone medications  2. Start taking your rescue medicine:    every 20 minutes for up to 1 hour. Then every 4 hours for 24-48 hours.  3. If you stay in the Yellow Zone for more than 12-24 hours, contact your doctor.  4. If you do not return to the Green Zone in 12-24 hours or you get worse, start taking your oral steroid medicine if prescribed by your provider.           RED ZONE Medical Alert - Get Help  I have ANY of these:    I feel awful    Medicine is not helping    Breathing getting harder    Trouble walking or talking    Nose opens wide to breathe       1. Take your rescue medicine NOW  2. If your provider has prescribed an oral steroid medicine, start taking it NOW  3. Call your doctor NOW  4. If you are still in the Red Zone after 20 minutes and you have not reached your doctor:    Take your rescue medicine again and    Call 911 or go to the emergency room right away    See your regular  doctor within 2 weeks of an Emergency Room or Urgent Care visit for follow-up treatment.          Annual Reminders:  Meet with Asthma Educator,  Flu Shot in the Fall, consider Pneumonia Vaccination for patients with asthma (aged 19 and older).    Pharmacy: [id number] - [address] AT [facility] [address]    Electronically signed by Aubrie Antoine MD, MD   Date: 04/30/21                    Asthma Triggers  How To Control Things That Make Your Asthma Worse    Triggers are things that make your asthma worse.  Look at the list below to help you find your triggers and   what you can do about them. You can help prevent asthma flare-ups by staying away from your triggers.      Trigger                                                          What you can do   Cigarette Smoke  Tobacco smoke can make asthma worse. Do not allow smoking in your home, car or around you.  Be sure no one smokes at a child s day care or school.  If you smoke, ask your health care provider for ways to help you quit.  Ask family members to quit too.  Ask your health care provider for a referral to Quit Plan to help you quit smoking, or call 7-747-153-PLAN.     Colds, Flu, Bronchitis  These are common triggers of asthma. Wash your hands often.  Don t touch your eyes, nose or mouth.  Get a flu shot every year.     Dust Mites  These are tiny bugs that live in cloth or carpet. They are too small to see. Wash sheets and blankets in hot water every week.   Encase pillows and mattress in dust mite proof covers.  Avoid having carpet if you can. If you have carpet, vacuum weekly.   Use a dust mask and HEPA vacuum.   Pollen and Outdoor Mold  Some people are allergic to trees, grass, or weed pollen, or molds. Try to keep your windows closed.  Limit time out doors when pollen count is high.   Ask you health care provider about taking medicine during allergy season.     Animal Dander  Some people are allergic to skin  flakes, urine or saliva from pets with fur or feathers. Keep pets with fur or feathers out of your home.    If you can t keep the pet outdoors, then keep the pet out of your bedroom.  Keep the bedroom door closed.  Keep pets off cloth furniture and away from stuffed toys.     Mice, Rats, and Cockroaches  Some people are allergic to the waste from these pests.   Cover food and garbage.  Clean up spills and food crumbs.  Store grease in the refrigerator.   Keep food out of the bedroom.   Indoor Mold  This can be a trigger if your home has high moisture. Fix leaking faucets, pipes, or other sources of water.   Clean moldy surfaces.  Dehumidify basement if it is damp and smelly.   Smoke, Strong Odors, and Sprays  These can reduce air quality. Stay away from strong odors and sprays, such as perfume, powder, hair spray, paints, smoke incense, paint, cleaning products, candles and new carpet.   Exercise or Sports  Some people with asthma have this trigger. Be active!  Ask your doctor about taking medicine before sports or exercise to prevent symptoms.    Warm up for 5-10 minutes before and after sports or exercise.     Other Triggers of Asthma  Cold air:  Cover your nose and mouth with a scarf.  Sometimes laughing or crying can be a trigger.  Some medicines and food can trigger asthma.

## 2021-05-01 RX ORDER — PROPRANOLOL HYDROCHLORIDE 10 MG/1
10-20 TABLET ORAL DAILY PRN
Qty: 30 TABLET | Refills: 0 | Status: SHIPPED | OUTPATIENT
Start: 2021-05-01 | End: 2022-08-10

## 2021-05-01 ASSESSMENT — ASTHMA QUESTIONNAIRES: ACT_TOTALSCORE: 25

## 2021-05-01 ASSESSMENT — ANXIETY QUESTIONNAIRES: GAD7 TOTAL SCORE: 0

## 2021-06-24 ENCOUNTER — OFFICE VISIT (OUTPATIENT)
Dept: URGENT CARE | Facility: URGENT CARE | Age: 61
End: 2021-06-24
Payer: COMMERCIAL

## 2021-06-24 VITALS
HEART RATE: 67 BPM | WEIGHT: 193.9 LBS | RESPIRATION RATE: 16 BRPM | BODY MASS INDEX: 28.63 KG/M2 | OXYGEN SATURATION: 99 % | DIASTOLIC BLOOD PRESSURE: 70 MMHG | SYSTOLIC BLOOD PRESSURE: 140 MMHG | TEMPERATURE: 97.7 F

## 2021-06-24 DIAGNOSIS — K11.20 SIALOADENITIS OF SUBMANDIBULAR GLAND: Primary | ICD-10-CM

## 2021-06-24 DIAGNOSIS — B02.22 ACUTE TRIGEMINAL HERPES ZOSTER: ICD-10-CM

## 2021-06-24 PROCEDURE — 99214 OFFICE O/P EST MOD 30 MIN: CPT | Performed by: FAMILY MEDICINE

## 2021-06-24 RX ORDER — VALACYCLOVIR HYDROCHLORIDE 1 G/1
1000 TABLET, FILM COATED ORAL 3 TIMES DAILY
Qty: 30 TABLET | Refills: 0 | Status: SHIPPED | OUTPATIENT
Start: 2021-06-24 | End: 2022-08-10

## 2021-06-24 RX ORDER — DOXYCYCLINE 100 MG/1
100 CAPSULE ORAL 2 TIMES DAILY
Qty: 20 CAPSULE | Refills: 0 | Status: SHIPPED | OUTPATIENT
Start: 2021-06-24 | End: 2021-07-04

## 2021-06-24 NOTE — PROGRESS NOTES
ASSESSMENT/  PLAN:  Sialoadenitis of submandibular gland     - doxycycline hyclate (VIBRAMYCIN) 100 MG capsule; Take 1 capsule (100 mg) by mouth 2 times daily for 10 days    She is concerned about bacterial infection of the right submandibular area, though she has broader tenderness and no swelling -  Will do trial of antibiotic     Given education materials on obstruction of the salivary gland  May apply a warm pack to the region to improve blood flow to the salivary gland    Acute trigeminal herpes zoster     - valACYclovir (VALTREX) 1000 mg tablet; Take 1 tablet (1,000 mg) by mouth 3 times daily for 10 days    Discussed that her pain symptoms have a typical trigeminal pattern, but with no signs of rash or sores.   Discussed that shingles can have severe pain days before an outbreak.   There is no test if rash has not presented,  Though if the pain responds to antiviral, it is indication of likely shingles.    She wanted printed Rx for valacyclovir-  She will do trial of antibiotic and start antiviral if not responding to the antibiotic           -------------------------------------------------------------------------------------------------------------------------     SUBJECTIVE:  Chief Complaint   Patient presents with     Facial Swelling     since monday      Felisha Gorman is a 61 year old female  with a chief complaint of swelling and tenderness in the right  Ear/ mouth/ cheek .  There has been  no associated redness,  no warmth and no purulence  in the swollen area.    Has pain into the right ear, no drainage       Onset of symptoms was 3 day(s) ago.      no swelling/ pain localized to a tooth or the gingiva    Course of illness: worsening.    Severity:  Severe pain-  Needs to take pain medication  Current and Associated symptoms:  ear pain and facial pain  Treatment measures tried include Tylenol/Ibuprofen    Past Medical History:   Diagnosis Date     Allergy, unspecified not elsewhere classified       Dysthymic disorder      Essential hypertension, benign      flight anxiety      Frequent BRONCHITIS     usually in winter     HSV (herpes simplex virus) anogenital infection      Impaired fasting glucose 9/23/2007    Glucose 101 9/07     Left anterior hemiblock      Mild intermittent asthma     URI induced     Obstructive sleep apnea (adult) (pediatric)     not on CPAP; feels rested 2014     Other and unspecified hyperlipidemia      Palpitations     PVC and PVC bigmeny     Rheumatic fever without mention of heart involvement     no sequelae     transient neurologic sx 2005    ? TIA; had MRI with temporal lobe lesion that is getting smaller     Patient Active Problem List   Diagnosis     Allergic state     Dysthymic disorder     Mild intermittent asthma     Hypertension goal BP (blood pressure) < 140/90     Obstructive sleep apnea     Anxiety state     IMPAIRED FASTING GLUCOSE     Major depression in complete remission (H)     CARDIOVASCULAR SCREENING; LDL GOAL LESS THAN 160     Family history of ovarian cancer     Knee pain     Osteoarthritis, knee     Abnormal finding on MRI of brain     Radicular low back pain     Recurrent major depressive disorder, in partial remission (H)     Non morbid obesity, unspecified obesity type       ALLERGIES:  Atorvastatin and Penicillin [penicillins]    MEDs  acyclovir (ZOVIRAX) 400 MG tablet, TAKE 1 TABLET(400 MG) BY MOUTH THREE TIMES DAILY FOR 7 DAYS  albuterol (VENTOLIN HFA) 108 (90 Base) MCG/ACT inhaler, Inhale 2 puffs into the lungs every 6 hours  ALPRAZolam (XANAX) 0.5 MG tablet, Take 1 tablet (0.5 mg) by mouth 3 times daily as needed for anxiety (Patient not taking: Reported on 4/30/2021)  clobetasol (TEMOVATE) 0.05 % external ointment, Apply topically 2 times daily . 2 weeks on, 2 weeks off. External genitalia only. (Patient not taking: Reported on 4/30/2021)  fluticasone (FLOVENT HFA) 220 MCG/ACT inhaler, Inhale 2 puffs into the lungs 2 times daily 2 puffs bid and rinse and  spit after use  losartan (COZAAR) 50 MG tablet, Take 1 tablet (50 mg) by mouth daily (Patient not taking: Reported on 2021)  metoprolol tartrate (LOPRESSOR) 50 MG tablet, Take 1 tablet (50 mg) by mouth as needed (palpitations) (Patient not taking: Reported on 2021)  propranolol (INDERAL) 10 MG tablet, Take 1-2 tablets (10-20 mg) by mouth daily as needed (palpitations)    No current facility-administered medications on file prior to visit.       Social History     Tobacco Use     Smoking status: Former Smoker     Packs/day: 2.00     Years: 10.00     Pack years: 20.00     Types: Cigarettes     Quit date: 1986     Years since quittin.5     Smokeless tobacco: Never Used   Substance Use Topics     Alcohol use: Yes     Alcohol/week: 0.0 - 0.8 standard drinks     Comment: 1 glass of wine every other week        Family History   Problem Relation Age of Onset     C.A.D. Brother         age 50     Heart Disease Brother      C.A.D. Mother      Heart Disease Mother      Cancer Mother         ovarian OK now     Cancer - colorectal Mother         cancerous polyp     Dementia Mother      C.A.D. Father         MI     Heart Disease Father        ROS:  CONSTITUTIONAL:NEGATIVE for fever, chills,    EYES: NEGATIVE for vision changes or irritation  RESP:NEGATIVE for significant cough or SOB  GI: NEGATIVE for nausea, abdominal pain,   or change in bowel habits       OBJECTIVE:   BP (!) 140/70 (BP Location: Right arm, Patient Position: Chair, Cuff Size: Adult Regular)   Pulse 67   Temp 97.7  F (36.5  C) (Oral)   Resp 16   Wt 88 kg (193 lb 14.4 oz)   LMP 2006   SpO2 99%   Breastfeeding No   BMI 28.63 kg/m    No Swelling in the right parotid, sublingual , submandibular  region with no associated redness, warmth, purulence, does have local tenderness to palpation of cheek, submandibular region and ear canal  GENERAL APPEARANCE:  , alert and mild distress,  EYES: EOMI,  PERRL, conjunctiva clear  ,HENT: ear  canals and TM's normal.  Nose normal.  Pharynx  no erythema , no exudate noted.  NECK: supple, non-tender to palpation, no adenopathy noted,  RESP: lungs clear to auscultation - no rales, rhonchi or wheezes  ,CV: regular rates and rhythm, normal S1 S2, no murmur noted,   SKIN: no suspicious lesions or rashes

## 2021-06-24 NOTE — PATIENT INSTRUCTIONS
Patient Education     Salivary Gland Infection  Salivary glands make saliva. Saliva is mostly water. It also has minerals and proteins that help break down food and keep the mouth and teeth healthy. There are 3 pairs of salivary glands:     Parotid glands. In front of the ear.    Submandibular glands. Below the jaw.    Sublingual glands. Below the tongue.  A salivary gland can become infected by germs (bacteria). Things that make this more likely include dehydration and taking medicines that affect saliva flow. Infection is also more likely when the tube (duct) that carries saliva from the gland to the mouth is blocked. It may be blocked by a salivary gland stone. This is a collection of minerals that forms in the salivary gland.   Symptoms of infection include fever, severe pain in the gland, and redness and swelling over the gland. It may hurt to open your mouth. Symptoms may be worse when saliva flow is stimulated, such as by the smell of food.    Antibiotics are used to treat the infection. Draining the infection with a simple surgery may be needed. If you have a salivary gland stone, a procedure may be done to remove it.   Home care    Take antibiotics as directed until they are finished. Do this even if you start to feel better after only a few days.    Unless another medicine was prescribed, take over-the-counter medicines to help ease pain. These include acetaminophen or ibuprofen.    Moist heat can also help ease swelling and pain. Wet a cloth with warm water and put it over the sore gland for 10 to 15 minutes several times a day.    Gently massage the gland a few times a day.     Suck on lemon or other tart hard candies to make saliva flow.  To help prevent stones and infections:     Drink 6 to 8 glasses of fluid per day (such as water, tea, and clear soup) to keep well-hydrated.    If you smoke, ask your healthcare provider for help to quit. Smoking makes salivary gland stones more likely.    Keep good  dental hygiene. Brush and floss your teeth daily. See your dentist for regular cleanings.  Follow-up care  Follow up with your healthcare provider, or as advised.    When to call your healthcare provider   Call your healthcare provider if any of the following occur:     Fever over 100.4 F (38 C) after 2 days of taking antibiotics    Symptoms that get worse or don't get better in a few days  Call 911  Call 911 if you have trouble breathing or swallowing.   [employer] last reviewed this educational content on 3/1/2020    [phone] [employer]. All rights reserved. This information is not intended as a substitute for professional medical care. Always follow your healthcare professional's instructions.           Patient Education     Shingles (Herpes Zoster)     Talk to your healthcare provider about the shingles vaccine.   Shingles is also called herpes zoster. It's a painful skin rash caused by the herpes zoster virus. This is the same virus that causes chickenpox. After a person has chickenpox, the virus stays inactive in the nerve cells. Years later, the virus can become active again and travel along the nerve to the skin. Most people have shingles only once. But it's possible to have it more than once.   Who is at risk for shingles?  Anyone who has ever had chickenpox can get shingles. But your risk is greater if you:     Are age 50 or older    Have an illness that weakens your immune system, such as HIV/AIDS    Have cancer, especially Hodgkin disease or lymphoma    Take medicines that weaken your immune system  What are the symptoms of shingles?    The first sign of shingles is often pain, burning, tingling, or itching on one part of your face or body. You may also feel as if you have the flu, with fever and chills.    A red rash with small blisters appears in a few days. The rash may look as follows:   ? The blisters can occur anywhere, but they re most common on the back, chest, or belly  (abdomen).  ? They usually appear on only one side of the body, spreading along the nerve pathway where the virus is reactivating.   ? The rash can also form around an eye, along one side of the face or neck, or in the mouth.  ? In a few people, often those with a weak immune system, shingles appear on more than one part of the body at once.    After a few days, the blisters become dry and form a crust. The crust falls off in days to weeks. The blisters generally don't leave scars. But they can in severe cases. Or if someone has a weak immune system.    How is shingles treated?  For most people, shingles heals on its own in a few days or weeks. But treatment is advised to help ease pain, speed healing, and reduce the risk of complications. Antiviral medicines are most often only prescribed if you are seen by a healthcare provider within the first 72 hours of having the rash. But antiviral medicines may be prescribed even after 72 hours if someone's immune system is weak. Or if the infection is extensive, severe, or isn't going away. To reduce symptoms:     Apply ice packs or cool compresses, or soak in a cool bath. To make an ice pack, put ice cubes in a plastic bag that seals at the top. Wrap the bag in a clean, thin towel or cloth. Never put ice or an ice pack directly on the skin.    Use calamine lotion to calm itchy skin.    Ask your provider about over-the-counter pain relievers. If your pain is severe, your provider may prescribe stronger pain medicines.  What are possible complications of shingles?   Shingles often goes away with no lasting effects. But some people have complications during or after the infection comes out:     Postherpetic neuralgia. This is the most common complication. It's more likely as people age, especially after age 60. It' is nerve pain at the place where the rash used to be. It can range from mild to severe. It can last for only a few days, or for months or even years after you have  had shingles. Antiviral medicines given during the first 72 hours of the rash can reduce the chance of postherpetic neuralgia. Other medicines can be prescribed to help ease the pain and improve quality of life.    Bacterial infection. Shingles blisters may get infected with bacteria. Depending on the severity of the infection, topical, oral or IV (intravenous) antibiotic medicine is used to treat the infection.    Eye problems. If you have shingles on the face, see your healthcare provider right away. Shingles can cause serious problems with vision, and even blindness.  In very rare cases, shingles can also lead to pneumonia, hearing problems, brain inflammation, or even death.    When to get medical care  Call your healthcare provider if you have any of these:     New symptoms or symptoms that don t go away with treatment    A rash or blisters near your eye    More drainage, fever, or rash after treatment    Severe pain that doesn t go away  How can shingles be prevented?  You can only get shingles if you have had chickenpox in the past. Someone who never had chickenpox can get the virus from you. But instead of have shingles, the person may get chickenpox. Until your blisters form scabs, don't have any contact with others, especially the following:     Pregnant women who have never had chickenpox or the vaccine    Babies who were born early (premature) or who had low weight at birth    People with weak immune systems (such as people getting chemotherapy for cancer, people who have had organ transplants, or people with HIV infections), particularly if they have never had chicken pox.  The shingles vaccine  The recombinant zoster vaccine (RZV) shingles vaccine is available to help prevent shingles or make it less painful.   You should get the RZV shingles vaccine if you are healthy and age 50 or older, even if you've had shingles in the past. Two shots of the RZV vaccine are recommended. You should get the second  RZV shot 2 to 6 months after the first. The vaccine makes it less likely that you will develop shingles. If you do develop shingles, your symptoms will likely be milder than if you hadn t been vaccinated. RZV is also advised even if you had the older shingles vaccine (zoster live vaccine, ZVL) in the past. That's because the RZV vaccine works better and protects you from shingles longer.   Talk with your healthcare provider about the best time to get vaccinated.   [employer] last reviewed this educational content on 6/1/2019 2000-2021 [employer]. All rights reserved. This information is not intended as a substitute for professional medical care. Always follow your healthcare professional's instructions.

## 2021-10-24 ENCOUNTER — HEALTH MAINTENANCE LETTER (OUTPATIENT)
Age: 61
End: 2021-10-24

## 2022-01-05 ENCOUNTER — OFFICE VISIT (OUTPATIENT)
Dept: FAMILY MEDICINE | Facility: CLINIC | Age: 62
End: 2022-01-05
Payer: COMMERCIAL

## 2022-01-05 VITALS
TEMPERATURE: 97.9 F | OXYGEN SATURATION: 96 % | SYSTOLIC BLOOD PRESSURE: 130 MMHG | BODY MASS INDEX: 29.53 KG/M2 | WEIGHT: 200 LBS | DIASTOLIC BLOOD PRESSURE: 78 MMHG | HEART RATE: 66 BPM | RESPIRATION RATE: 16 BRPM

## 2022-01-05 DIAGNOSIS — Z23 NEED FOR PROPHYLACTIC VACCINATION AND INOCULATION AGAINST INFLUENZA: ICD-10-CM

## 2022-01-05 DIAGNOSIS — F33.41 RECURRENT MAJOR DEPRESSIVE DISORDER, IN PARTIAL REMISSION (H): ICD-10-CM

## 2022-01-05 DIAGNOSIS — I47.0 RE-ENTRY VENTRICULAR ARRHYTHMIA (H): ICD-10-CM

## 2022-01-05 DIAGNOSIS — R30.0 DYSURIA: Primary | ICD-10-CM

## 2022-01-05 DIAGNOSIS — N39.0 ACUTE UTI: ICD-10-CM

## 2022-01-05 LAB
ALBUMIN UR-MCNC: NEGATIVE MG/DL
APPEARANCE UR: ABNORMAL
BACTERIA #/AREA URNS HPF: ABNORMAL /HPF
BILIRUB UR QL STRIP: NEGATIVE
CAOX CRY #/AREA URNS HPF: ABNORMAL /HPF
CLUE CELLS: ABNORMAL
COLOR UR AUTO: YELLOW
GLUCOSE UR STRIP-MCNC: NEGATIVE MG/DL
HGB UR QL STRIP: ABNORMAL
KETONES UR STRIP-MCNC: NEGATIVE MG/DL
LEUKOCYTE ESTERASE UR QL STRIP: ABNORMAL
NITRATE UR QL: POSITIVE
PH UR STRIP: 5.5 [PH] (ref 5–7)
RBC #/AREA URNS AUTO: ABNORMAL /HPF
SP GR UR STRIP: 1.02 (ref 1–1.03)
SQUAMOUS #/AREA URNS AUTO: ABNORMAL /LPF
TRICHOMONAS, WET PREP: ABNORMAL
UROBILINOGEN UR STRIP-ACNC: 1 E.U./DL
WBC #/AREA URNS AUTO: >100 /HPF
WBC'S/HIGH POWER FIELD, WET PREP: ABNORMAL
YEAST, WET PREP: ABNORMAL

## 2022-01-05 PROCEDURE — 87186 SC STD MICRODIL/AGAR DIL: CPT | Performed by: NURSE PRACTITIONER

## 2022-01-05 PROCEDURE — 99213 OFFICE O/P EST LOW 20 MIN: CPT | Mod: 25 | Performed by: NURSE PRACTITIONER

## 2022-01-05 PROCEDURE — 87086 URINE CULTURE/COLONY COUNT: CPT | Performed by: NURSE PRACTITIONER

## 2022-01-05 PROCEDURE — 90471 IMMUNIZATION ADMIN: CPT | Performed by: NURSE PRACTITIONER

## 2022-01-05 PROCEDURE — 90682 RIV4 VACC RECOMBINANT DNA IM: CPT | Performed by: NURSE PRACTITIONER

## 2022-01-05 PROCEDURE — 87210 SMEAR WET MOUNT SALINE/INK: CPT | Performed by: NURSE PRACTITIONER

## 2022-01-05 PROCEDURE — 81001 URINALYSIS AUTO W/SCOPE: CPT | Performed by: NURSE PRACTITIONER

## 2022-01-05 PROCEDURE — 96127 BRIEF EMOTIONAL/BEHAV ASSMT: CPT | Performed by: NURSE PRACTITIONER

## 2022-01-05 RX ORDER — NITROFURANTOIN 25; 75 MG/1; MG/1
100 CAPSULE ORAL 2 TIMES DAILY
Qty: 10 CAPSULE | Refills: 0 | Status: SHIPPED | OUTPATIENT
Start: 2022-01-05 | End: 2022-01-10

## 2022-01-05 ASSESSMENT — ANXIETY QUESTIONNAIRES
GAD7 TOTAL SCORE: 2
5. BEING SO RESTLESS THAT IT IS HARD TO SIT STILL: NOT AT ALL
6. BECOMING EASILY ANNOYED OR IRRITABLE: NOT AT ALL
7. FEELING AFRAID AS IF SOMETHING AWFUL MIGHT HAPPEN: NOT AT ALL
1. FEELING NERVOUS, ANXIOUS, OR ON EDGE: NOT AT ALL
IF YOU CHECKED OFF ANY PROBLEMS ON THIS QUESTIONNAIRE, HOW DIFFICULT HAVE THESE PROBLEMS MADE IT FOR YOU TO DO YOUR WORK, TAKE CARE OF THINGS AT HOME, OR GET ALONG WITH OTHER PEOPLE: NOT DIFFICULT AT ALL
3. WORRYING TOO MUCH ABOUT DIFFERENT THINGS: SEVERAL DAYS
2. NOT BEING ABLE TO STOP OR CONTROL WORRYING: SEVERAL DAYS

## 2022-01-05 ASSESSMENT — PATIENT HEALTH QUESTIONNAIRE - PHQ9
SUM OF ALL RESPONSES TO PHQ QUESTIONS 1-9: 4
5. POOR APPETITE OR OVEREATING: NOT AT ALL

## 2022-01-05 NOTE — PROGRESS NOTES
Assessment & Plan     Dysuria  +UTI; normal wet prep  - UA Macro with Reflex to Micro and Culture - lab collect; Future  - Wet prep - lab collect; Future  - UA Macro with Reflex to Micro and Culture - lab collect  - Wet prep - lab collect  - Urine Microscopic Exam  - Urine Culture    Acute UTI  Start on antibiotics.  Increase fluids.   - nitroFURantoin macrocrystal-monohydrate (MACROBID) 100 MG capsule; Take 1 capsule (100 mg) by mouth 2 times daily for 5 days    Need for prophylactic vaccination and inoculation against influenza  Administered today.   - INFLUENZA QUAD, RECOMBINANT, P-FREE (RIV4) (FLUBLOK)    Recurrent major depressive disorder, in partial remission (H)  PHQ=4 today; reports mood is stable. Not currently on medication.    Re-entry ventricular arrhythmia (H)  Managed by cardiology.          Return in about 3 months (around 4/5/2022) for Preventive Visit, In-Clinic Visit.    COLETTE Suarez CNP  M [facility] MARINA Baeza is a 61 year old who presents for the following health issues     HPI     Genitourinary - Female  Onset/Duration: about 3-4 days ago   Description:   Painful urination (Dysuria): YES           Frequency: YES  Blood in urine (Hematuria): no  Delay in urine (Hesitency): YES  Intensity: moderate  Progression of Symptoms:  worsening  Accompanying Signs & Symptoms:  Fever/chills: YES- chill last evening  Flank pain: no  Nausea and vomiting: no  Vaginal symptoms: itching  Abdominal/Pelvic Pain: no  History:   History of frequent UTI s: YES  History of kidney stones: no  Sexually Active: YES  Possibility of pregnancy: No  Precipitating or alleviating factors: None  Therapies tried and outcome: nothing    Urine has a strong odor.       Review of Systems   Constitutional, HEENT, cardiovascular, pulmonary, gi and gu systems are negative, except as otherwise noted.      Objective    /78   Pulse 66   Temp 97.9  F (36.6  C) (Oral)   Resp 16   Wt  90.7 kg (200 lb)   LMP 09/29/2006   SpO2 96%   BMI 29.53 kg/m    Body mass index is 29.53 kg/m .  Physical Exam   GENERAL: healthy, alert and no distress  ABDOMEN: soft, nontender, no hepatosplenomegaly, no masses and bowel sounds normal  BACK: no CVA tenderness    Results for orders placed or performed in visit on 01/05/22 (from the past 24 hour(s))   UA Macro with Reflex to Micro and Culture - lab collect    Specimen: Urine, Midstream   Result Value Ref Range    Color Urine Yellow Colorless, Straw, Light Yellow, Yellow    Appearance Urine Cloudy (A) Clear    Glucose Urine Negative Negative mg/dL    Bilirubin Urine Negative Negative    Ketones Urine Negative Negative mg/dL    Specific Gravity Urine 1.025 1.003 - 1.035    Blood Urine Moderate (A) Negative    pH Urine 5.5 5.0 - 7.0    Protein Albumin Urine Negative Negative mg/dL    Urobilinogen Urine 1.0 0.2, 1.0 E.U./dL    Nitrite Urine Positive (A) Negative    Leukocyte Esterase Urine Small (A) Negative   Wet prep - lab collect    Specimen: Vagina; Swab   Result Value Ref Range    Trichomonas Absent Absent    Yeast Absent Absent    Clue Cells Absent Absent    WBCs/high power field 4+ (A) None   Urine Microscopic Exam   Result Value Ref Range    Bacteria Urine Many (A) None Seen /HPF    RBC Urine 25-50 (A) 0-2 /HPF /HPF    WBC Urine >100 (A) 0-5 /HPF /HPF    Squamous Epithelials Urine Few (A) None Seen /LPF    Calcium Oxalate Crystals Urine Moderate (A) None Seen /HPF

## 2022-01-06 ASSESSMENT — ANXIETY QUESTIONNAIRES: GAD7 TOTAL SCORE: 2

## 2022-01-06 ASSESSMENT — ASTHMA QUESTIONNAIRES: ACT_TOTALSCORE: 23

## 2022-01-07 LAB — BACTERIA UR CULT: ABNORMAL

## 2022-04-10 ENCOUNTER — HEALTH MAINTENANCE LETTER (OUTPATIENT)
Age: 62
End: 2022-04-10

## 2022-06-05 ENCOUNTER — HEALTH MAINTENANCE LETTER (OUTPATIENT)
Age: 62
End: 2022-06-05

## 2022-07-23 ENCOUNTER — HOSPITAL ENCOUNTER (EMERGENCY)
Facility: CLINIC | Age: 62
Discharge: HOME | End: 2022-07-23
Attending: EMERGENCY MEDICINE | Admitting: EMERGENCY MEDICINE
Payer: COMMERCIAL

## 2022-07-23 VITALS
TEMPERATURE: 98.4 F | DIASTOLIC BLOOD PRESSURE: 88 MMHG | RESPIRATION RATE: 16 BRPM | HEART RATE: 95 BPM | SYSTOLIC BLOOD PRESSURE: 180 MMHG | OXYGEN SATURATION: 100 %

## 2022-07-23 DIAGNOSIS — R42 DIZZINESS: ICD-10-CM

## 2022-07-23 DIAGNOSIS — T65.91XA ACCIDENTAL INGESTION OF SUBSTANCE, INITIAL ENCOUNTER: ICD-10-CM

## 2022-07-23 DIAGNOSIS — R00.2 PALPITATIONS: ICD-10-CM

## 2022-07-23 LAB
ANION GAP SERPL CALCULATED.3IONS-SCNC: 5 MMOL/L (ref 3–14)
BASOPHILS # BLD AUTO: 0.1 10E3/UL (ref 0–0.2)
BASOPHILS NFR BLD AUTO: 1 %
BUN SERPL-MCNC: 25 MG/DL (ref 7–30)
CALCIUM SERPL-MCNC: 8.8 MG/DL (ref 8.5–10.1)
CHLORIDE BLD-SCNC: 109 MMOL/L (ref 94–109)
CO2 SERPL-SCNC: 24 MMOL/L (ref 20–32)
CREAT SERPL-MCNC: 0.76 MG/DL (ref 0.52–1.04)
EOSINOPHIL # BLD AUTO: 0.5 10E3/UL (ref 0–0.7)
EOSINOPHIL NFR BLD AUTO: 4 %
ERYTHROCYTE [DISTWIDTH] IN BLOOD BY AUTOMATED COUNT: 13.8 % (ref 10–15)
GFR SERPL CREATININE-BSD FRML MDRD: 87 ML/MIN/1.73M2
GLUCOSE BLD-MCNC: 130 MG/DL (ref 70–99)
HCT VFR BLD AUTO: 44.1 % (ref 35–47)
HGB BLD-MCNC: 13.9 G/DL (ref 11.7–15.7)
IMM GRANULOCYTES # BLD: 0 10E3/UL
IMM GRANULOCYTES NFR BLD: 0 %
LYMPHOCYTES # BLD AUTO: 3 10E3/UL (ref 0.8–5.3)
LYMPHOCYTES NFR BLD AUTO: 25 %
MCH RBC QN AUTO: 28.5 PG (ref 26.5–33)
MCHC RBC AUTO-ENTMCNC: 31.5 G/DL (ref 31.5–36.5)
MCV RBC AUTO: 90 FL (ref 78–100)
MONOCYTES # BLD AUTO: 1 10E3/UL (ref 0–1.3)
MONOCYTES NFR BLD AUTO: 9 %
NEUTROPHILS # BLD AUTO: 7.1 10E3/UL (ref 1.6–8.3)
NEUTROPHILS NFR BLD AUTO: 61 %
NRBC # BLD AUTO: 0 10E3/UL
NRBC BLD AUTO-RTO: 0 /100
PLATELET # BLD AUTO: 347 10E3/UL (ref 150–450)
POTASSIUM BLD-SCNC: 3.5 MMOL/L (ref 3.4–5.3)
RBC # BLD AUTO: 4.88 10E6/UL (ref 3.8–5.2)
SODIUM SERPL-SCNC: 138 MMOL/L (ref 133–144)
WBC # BLD AUTO: 11.7 10E3/UL (ref 4–11)

## 2022-07-23 PROCEDURE — 96360 HYDRATION IV INFUSION INIT: CPT

## 2022-07-23 PROCEDURE — 99285 EMERGENCY DEPT VISIT HI MDM: CPT | Mod: 25

## 2022-07-23 PROCEDURE — 250N000011 HC RX IP 250 OP 636: Performed by: EMERGENCY MEDICINE

## 2022-07-23 PROCEDURE — 36415 COLL VENOUS BLD VENIPUNCTURE: CPT | Performed by: EMERGENCY MEDICINE

## 2022-07-23 PROCEDURE — 999N000105 HC STATISTIC NO DOCUMENTATION TO SUPPORT CHARGE

## 2022-07-23 PROCEDURE — 93005 ELECTROCARDIOGRAM TRACING: CPT

## 2022-07-23 PROCEDURE — 85025 COMPLETE CBC W/AUTO DIFF WBC: CPT | Performed by: EMERGENCY MEDICINE

## 2022-07-23 PROCEDURE — 258N000003 HC RX IP 258 OP 636: Performed by: EMERGENCY MEDICINE

## 2022-07-23 PROCEDURE — 96361 HYDRATE IV INFUSION ADD-ON: CPT

## 2022-07-23 PROCEDURE — 80048 BASIC METABOLIC PNL TOTAL CA: CPT | Performed by: EMERGENCY MEDICINE

## 2022-07-23 RX ORDER — LORAZEPAM 2 MG/ML
0.5 INJECTION INTRAMUSCULAR ONCE
Status: COMPLETED | OUTPATIENT
Start: 2022-07-23 | End: 2022-07-23

## 2022-07-23 RX ADMIN — SODIUM CHLORIDE, POTASSIUM CHLORIDE, SODIUM LACTATE AND CALCIUM CHLORIDE 1000 ML: 600; 310; 30; 20 INJECTION, SOLUTION INTRAVENOUS at 01:31

## 2022-07-23 RX ADMIN — LORAZEPAM 0.5 MG: 2 INJECTION INTRAMUSCULAR at 01:32

## 2022-07-23 ASSESSMENT — ENCOUNTER SYMPTOMS: DIZZINESS: 1

## 2022-07-23 NOTE — ED PROVIDER NOTES
History   Chief Complaint:  Ingestion       The history is provided by the patient.      Felisha Gorman is a 62 year old female with history of hypertension who presents with dizziness and ingestion. The patient states that for the past couple of years upon standing up she becomes dizzy and feels her heart racing. Last week she had this same feeing associated with two episodes of syncope that were 10 seconds apart. She states that she woke diaphoretic up and got out of bed, then became dizzy and lost consciousness. She states that when she got up again 10 seconds later she had another syncopal episode. She reports taking a delta 8 gummy tonight at approximately 2030, and then developing the same dizzy feeling as well as tingling throughout her body. She states that this time it is different, because the feeling is improving, however not going away. She also mentions that she currently feels jittery and has a dry mouth. She denies any diet changes but notes she has not been drinking enough water. She jessica any bowel or bladder changes. Lastly, she mentions that she wore a heart monitor for these symptoms approximately one year ago, where one non-significant abnormality was found.    Review of Systems   Neurological: Positive for dizziness and syncope.   All other systems reviewed and are negative.        Allergies:  Atorvastatin  Penicillin [Penicillins]    Medications:  Acyclovir  Albuterol  Xanax  Fluticasone  Propanolol  Valacyclovir    Past Medical History:     Allergic state  Dysthymic disorder  Mild intermittent asthma  Hypertension   Obstructive sleep apnea  Anxiety  Major depression in complete remission  Osteoarthritis, knee  Radicular low back pain  Non morbid obesity    Past Surgical History:    Colonoscopy x2  Endometrial ablation  Bilateral breast reduction and tummy tuck  Nasal polyp removed    Family History:    Mother: CAD, ovarian cancer, colorectal cancer, dementia  Father: CAD  Brother:  CAD    Social History:  The patient presents to the ED with her   Drug use: positive (delta-8 gummy)    Physical Exam     Patient Vitals for the past 24 hrs:   BP Temp Temp src Pulse Resp SpO2   07/23/22 0312 (!) 180/88 -- -- 95 16 100 %   07/23/22 0004 (!) 182/90 98.4  F (36.9  C) Oral 107 20 98 %       Physical Exam  Gen: well appearing, in no acute distress  HENT:  mmm, no rhinorrhea  Eyes: periorbital tissues and sclera normal, pupils equal  Neck: supple, no abnormal swelling  Lungs:  CTAB,  no resp distress  CV: rrr, no m/r/g, ppi  Abd: soft, nontender, nondistended, no rebound/masses/guarding/hsm  Ext: no peripheral edema  Skin: warm, dry, well perfused, no rashes/bruising/lesions on exposed skin  Neuro: alert, cranial nerves II 12 intact and symmetric, 5 out of 5 motor bilateral upper and lower extremities, coronation grossly intact, sensation tact light touch all extremities.  Psych: Normal mood, normal affect      Emergency Department Course   ECG  ECG results from 07/23/22   EKG 12-lead, tracing only     Value    Systolic Blood Pressure     Diastolic Blood Pressure     Ventricular Rate 90    Atrial Rate 90    WY Interval 192    QRS Duration 92        QTc 484    P Axis 56    R AXIS -39    T Axis 38    Interpretation ECG      Sinus rhythm  Left axis deviation  Abnormal ECG  When compared with ECG of 01-JAN-2021 04:45,  Premature ventricular complexes are no longer Present  Sinus rhythm is no longer with ventricular escape complexes       Laboratory:  Labs Ordered and Resulted from Time of ED Arrival to Time of ED Departure   BASIC METABOLIC PANEL - Abnormal       Result Value    Sodium 138      Potassium 3.5      Chloride 109      Carbon Dioxide (CO2) 24      Anion Gap 5      Urea Nitrogen 25      Creatinine 0.76      Calcium 8.8      Glucose 130 (*)     GFR Estimate 87     CBC WITH PLATELETS AND DIFFERENTIAL - Abnormal    WBC Count 11.7 (*)     RBC Count 4.88      Hemoglobin 13.9       Hematocrit 44.1      MCV 90      MCH 28.5      MCHC 31.5      RDW 13.8      Platelet Count 347      % Neutrophils 61      % Lymphocytes 25      % Monocytes 9      % Eosinophils 4      % Basophils 1      % Immature Granulocytes 0      NRBCs per 100 WBC 0      Absolute Neutrophils 7.1      Absolute Lymphocytes 3.0      Absolute Monocytes 1.0      Absolute Eosinophils 0.5      Absolute Basophils 0.1      Absolute Immature Granulocytes 0.0      Absolute NRBCs 0.0          Emergency Department Course:             Reviewed:  I reviewed nursing notes, vitals, past medical history and Care Everywhere    Assessments:  0038 I obtained history and examined the patient as noted above.   0305 I rechecked the patient and explained findings.     Interventions:  0131 Lactated ringers bolus 1,000 mL IV  0132 Ativan 0.5 mg IV    Disposition:  The patient was discharged to home.     Impression & Plan     Medical Decision Makin-year-old female here with palpitations and dizziness feeling unwell after having an edible gummy earlier this evening.  She is also had 2 recent near syncopal or syncopal episodes.  Basic blood test here are unremarkable.  EKG shows normal sinus rhythm without red flags for malignant arrhythmia or ischemia.  Low suspicion for underlying concerning cardiac arrhythmia as the cause of her previous episodes.  Discussed heart monitor and tilt table test may be helpful if the episodes continue.  Discussed some of signs and symptoms be related to the delta 8 THC gummy    Diagnosis:    ICD-10-CM    1. Palpitations  R00.2    2. Dizziness  R42    3. Accidental ingestion of substance, initial encounter  T65.91XA      Scribe Disclosure:  BEATA, Amrita Bentley, am serving as a scribe at 12:38 AM on 2022 to document services personally performed by Jamie Fregoso MD* based on my observations and the provider's statements to me.              Jamie Fregoso MD  [id number]

## 2022-07-23 NOTE — ED TRIAGE NOTES
"Pt states that she felt strange tonight after taking a \"gummy\". Pt states she believes the gummy contained CBD. Pt c/o dry mouth and feeling \"strange\". Pt also notes 2 syncopal episodes one week ago. ABCs intact GCS 15     Triage Assessment     Row Name 07/23/22 0004       Triage Assessment (Adult)    Airway WDL WDL       Respiratory WDL    Respiratory WDL WDL       Skin Circulation/Temperature WDL    Skin Circulation/Temperature WDL WDL       Cardiac WDL    Cardiac WDL WDL       Peripheral/Neurovascular WDL    Peripheral Neurovascular WDL WDL       Cognitive/Neuro/Behavioral WDL    Cognitive/Neuro/Behavioral WDL WDL              "

## 2022-07-25 ENCOUNTER — TELEPHONE (OUTPATIENT)
Dept: CARDIOLOGY | Facility: CLINIC | Age: 62
End: 2022-07-25

## 2022-07-25 LAB
ATRIAL RATE - MUSE: 90 BPM
DIASTOLIC BLOOD PRESSURE - MUSE: NORMAL MMHG
INTERPRETATION ECG - MUSE: NORMAL
P AXIS - MUSE: 56 DEGREES
PR INTERVAL - MUSE: 192 MS
QRS DURATION - MUSE: 92 MS
QT - MUSE: 396 MS
QTC - MUSE: 484 MS
R AXIS - MUSE: -39 DEGREES
SYSTOLIC BLOOD PRESSURE - MUSE: NORMAL MMHG
T AXIS - MUSE: 38 DEGREES
VENTRICULAR RATE- MUSE: 90 BPM

## 2022-07-25 NOTE — TELEPHONE ENCOUNTER
Return Pt call discussed ER visit. Pt no longer having any symptoms, and symptoms she had she has had in past. Did discuss gummies she used and she felt some of issues may have been a side effect from gummies. Pt said she restarted her BP meds after going off them because she had lost weight but now put 1/2 weight back on so restarted some of her BP meds. Offered earlier appt with another provider that Pt declined. Asked her to see PMD which she said retired. Informed her could get BP checked at grocery store or local firehouse. Pt said she would look fo rnew PMD and keep OV she has in 12/2022. JEFRY Rodgers RN

## 2022-07-25 NOTE — TELEPHONE ENCOUNTER
[facility]    Phone Message    May a detailed message be left on voicemail: yes     Reason for Call: Symptoms or Concerns     If patient has red-flag symptoms, warm transfer to triage line    Current symptom or concern: Abnormal EKG when she went to the ED on 7/23.  She reports passing out twice while walking last week and that on Friday her Bp was 189/90.  She is scheduled for first available on 12/7 and is on the wait list.  But she is concerned with these recent events and would appreciate a call back to discuss    Symptoms have been present for:  2 week(s)    Has patient previously been seen for this? Yes    By : [facility] ED    Date: 7/23/2022    Are there any new or worsening symptoms? No      Action Taken: Message routed to:  Other: Cardiology    Travel Screening: Not Applicable

## 2022-08-10 ENCOUNTER — OFFICE VISIT (OUTPATIENT)
Dept: FAMILY MEDICINE | Facility: CLINIC | Age: 62
End: 2022-08-10
Payer: COMMERCIAL

## 2022-08-10 VITALS
TEMPERATURE: 98.1 F | OXYGEN SATURATION: 95 % | RESPIRATION RATE: 17 BRPM | HEIGHT: 69 IN | DIASTOLIC BLOOD PRESSURE: 78 MMHG | WEIGHT: 205.6 LBS | BODY MASS INDEX: 30.45 KG/M2 | HEART RATE: 67 BPM | SYSTOLIC BLOOD PRESSURE: 112 MMHG

## 2022-08-10 DIAGNOSIS — I10 HYPERTENSION GOAL BP (BLOOD PRESSURE) < 140/90: Primary | ICD-10-CM

## 2022-08-10 DIAGNOSIS — R00.2 PALPITATIONS: ICD-10-CM

## 2022-08-10 DIAGNOSIS — U09.9 POST COVID-19 CONDITION, UNSPECIFIED: ICD-10-CM

## 2022-08-10 DIAGNOSIS — I10 HYPERTENSION GOAL BP (BLOOD PRESSURE) < 140/90: ICD-10-CM

## 2022-08-10 DIAGNOSIS — G47.33 OBSTRUCTIVE SLEEP APNEA: ICD-10-CM

## 2022-08-10 PROCEDURE — 99215 OFFICE O/P EST HI 40 MIN: CPT | Performed by: INTERNAL MEDICINE

## 2022-08-10 RX ORDER — PROPRANOLOL HYDROCHLORIDE 10 MG/1
10-20 TABLET ORAL DAILY PRN
Qty: 30 TABLET | Refills: 0
Start: 2022-08-10 | End: 2024-05-28

## 2022-08-10 RX ORDER — LOSARTAN POTASSIUM 25 MG/1
25 TABLET ORAL DAILY
Qty: 90 TABLET | Refills: 0
Start: 2022-08-10 | End: 2022-12-19

## 2022-08-10 ASSESSMENT — ENCOUNTER SYMPTOMS: SYNCOPE: 1

## 2022-08-10 ASSESSMENT — PATIENT HEALTH QUESTIONNAIRE - PHQ9
SUM OF ALL RESPONSES TO PHQ QUESTIONS 1-9: 5
10. IF YOU CHECKED OFF ANY PROBLEMS, HOW DIFFICULT HAVE THESE PROBLEMS MADE IT FOR YOU TO DO YOUR WORK, TAKE CARE OF THINGS AT HOME, OR GET ALONG WITH OTHER PEOPLE: NOT DIFFICULT AT ALL
SUM OF ALL RESPONSES TO PHQ QUESTIONS 1-9: 5

## 2022-08-10 ASSESSMENT — PAIN SCALES - GENERAL: PAINLEVEL: MILD PAIN (2)

## 2022-08-10 NOTE — PROGRESS NOTES
"        Assessment & Plan     (I10) Hypertension goal BP (blood pressure) < 140/90  (primary encounter diagnosis)  Comment: elevated at ER and BLOOD PRESSURE now much improved since she has resumed lower dose ARB.   Plan: losartan (COZAAR) 25 MG tablet        Monitor BP    (U09.9) Post covid-19 condition, unspecified  Comment: pt reported 6/2022; more cardiac concerns since that time including palpitations, BLOOD PRESSURE, risk for other symptoms; will proceed with ECHO.  Plan: Echocardiogram Complete          (R00.2) Palpitations  Comment: intermittent; was prescribed BB in the past and took for several years; she discontiued on her own.   Plan: Echocardiogram Complete, propranolol (INDERAL)         10 MG tablet          (G47.33) Obstructive sleep apnea  Comment: past ELO eval; no CPAP used  Plan: closely monitor; consider sleep clinic follow habits.     (I10) Hypertension goal BP (blood pressure) < 140/90  Comment: monitor BLOOD PRESSURE   Plan: losartan (COZAAR) 25 MG tablet            Ordering of each unique test  Prescription drug management  46 minutes spent on the date of the encounter doing chart review, history and exam, documentation and further activities per the note       BMI:   Estimated body mass index is 30.58 kg/m  as calculated from the following:    Height as of this encounter: 1.746 m (5' 8.75\").    Weight as of this encounter: 93.3 kg (205 lb 9.6 oz).   Weight management plan: Discussed healthy diet and exercise guidelines    MEDICATIONS:   Orders Placed This Encounter   Medications     losartan (COZAAR) 25 MG tablet     Sig: Take 1 tablet (25 mg) by mouth daily     Dispense:  90 tablet     Refill:  0     propranolol (INDERAL) 10 MG tablet     Sig: Take 1-2 tablets (10-20 mg) by mouth daily as needed (palpitations)     Dispense:  30 tablet     Refill:  0          - Continue other medications without change  Regular exercise    Return in about 7 weeks (around 9/26/2022) for reassess BP, follow up " "after evaluaiton to inclduing ECHO and Carotid US, etc..    Shawna Magallon MD  Internal Medicine   [facility] MARINA Baeza is a 62 year old, presenting for the following health issues:  ER F/U, Syncope (Syncopal episodes about 5 minutes apart  (this was 2 weeks ago when she got up during the night)    states is common to feel light headed when up at night but this was worse.  ), and Covid Concern (States had covid 2 months ago and just recently got back her taste and smell.  States has not felt the same since then.  Feels has been more faint with some irregular beats. )      Syncope          ED/UC Followup:    Facility:  [facility]  Date of visit: 7/23/2022  Reason for visit: Ingestion  Ingested THC edible-  felt dizzy, light headed, numb tingly all over. Tongue numb, throat felt swollen, weak, felt like she was going to black out.  No further edibles taken.  Current Status: has been fine no further symptoms    Syncopal episodes:   Syncopal episodes  X 2 since June COVID about 5 minutes apart  (this was 2 weeks ago when she got up during the night)    states is common to feel light headed when up at night but this was worse.  COVID Infection, resolved on own; did not seek treatment; sense of taste and smell just returned after 2 months.     States since having COVID 2 months ago has been cold all the time and noting some irregular heartbeat at times.      Has not been taking her blood pressure meds regularly.  Only restarted taking Losartan 1/2 tablet after being to the ER and blood pressure was elevated.   She has seen  Cardiology in the past and has an appt in December      Review of Systems   Cardiovascular: Positive for syncope.      Monitor BLOOD PRESSURE  Statin therapy  Stroke risk reviewed;         Objective    /78   Pulse 67   Temp 98.1  F (36.7  C) (Oral)   Resp 17   Ht 1.746 m (5' 8.75\")   Wt 93.3 kg (205 lb 9.6 " oz)   LMP 09/29/2006   SpO2 95%   BMI 30.58 kg/m    Body mass index is 30.58 kg/m .  Physical Exam   GENERAL: healthy, alert and no distress  NECK: no adenopathy, no asymmetry, masses, or scars and thyroid normal to palpation  RESP: lungs clear to auscultation - no rales, rhonchi or wheezes  CV: regular rate and rhythm, normal S1 S2, no S3 or S4, no murmur, click or rub, no peripheral edema and peripheral pulses strong  ABDOMEN: soft, nontender, no hepatosplenomegaly, no masses and bowel sounds normal  MS: no gross musculoskeletal defects noted, no edema  NEURO: Normal strength and tone, mentation intact and speech normal  PSYCH: mentation appears normal, affect normal/bright              .  ..  Answers for HPI/ROS submitted by the patient on 8/10/2022  If you checked off any problems, how difficult have these problems made it for you to do your work, take care of things at home, or get along with other people?: Not difficult at all  PHQ9 TOTAL SCORE: 5

## 2022-09-09 ENCOUNTER — HOSPITAL ENCOUNTER (OUTPATIENT)
Dept: CARDIOLOGY | Facility: CLINIC | Age: 62
Discharge: HOME | End: 2022-09-09
Attending: INTERNAL MEDICINE | Admitting: INTERNAL MEDICINE
Payer: COMMERCIAL

## 2022-09-09 DIAGNOSIS — R00.2 PALPITATIONS: ICD-10-CM

## 2022-09-09 DIAGNOSIS — U09.9 POST COVID-19 CONDITION, UNSPECIFIED: ICD-10-CM

## 2022-09-09 LAB — LVEF ECHO: NORMAL

## 2022-09-09 PROCEDURE — 93306 TTE W/DOPPLER COMPLETE: CPT | Mod: 26 | Performed by: INTERNAL MEDICINE

## 2022-09-09 PROCEDURE — 93306 TTE W/DOPPLER COMPLETE: CPT

## 2022-10-15 ENCOUNTER — HEALTH MAINTENANCE LETTER (OUTPATIENT)
Age: 62
End: 2022-10-15

## 2022-11-06 ENCOUNTER — ANCILLARY PROCEDURE (OUTPATIENT)
Dept: GENERAL RADIOLOGY | Facility: CLINIC | Age: 62
End: 2022-11-06
Attending: FAMILY MEDICINE
Payer: COMMERCIAL

## 2022-11-06 ENCOUNTER — OFFICE VISIT (OUTPATIENT)
Dept: URGENT CARE | Facility: URGENT CARE | Age: 62
End: 2022-11-06
Payer: COMMERCIAL

## 2022-11-06 VITALS
DIASTOLIC BLOOD PRESSURE: 95 MMHG | SYSTOLIC BLOOD PRESSURE: 174 MMHG | TEMPERATURE: 97.8 F | RESPIRATION RATE: 14 BRPM | HEART RATE: 70 BPM | OXYGEN SATURATION: 97 %

## 2022-11-06 DIAGNOSIS — J01.90 ACUTE SINUSITIS WITH SYMPTOMS > 10 DAYS: ICD-10-CM

## 2022-11-06 DIAGNOSIS — R05.1 ACUTE COUGH: Primary | ICD-10-CM

## 2022-11-06 DIAGNOSIS — J22 LOWER RESPIRATORY TRACT INFECTION: ICD-10-CM

## 2022-11-06 DIAGNOSIS — R06.02 SOB (SHORTNESS OF BREATH): ICD-10-CM

## 2022-11-06 DIAGNOSIS — R05.1 ACUTE COUGH: ICD-10-CM

## 2022-11-06 PROCEDURE — 71046 X-RAY EXAM CHEST 2 VIEWS: CPT | Mod: TC | Performed by: RADIOLOGY

## 2022-11-06 PROCEDURE — 99214 OFFICE O/P EST MOD 30 MIN: CPT | Performed by: FAMILY MEDICINE

## 2022-11-06 RX ORDER — DOXYCYCLINE 100 MG/1
100 CAPSULE ORAL 2 TIMES DAILY
Qty: 20 CAPSULE | Refills: 0 | Status: SHIPPED | OUTPATIENT
Start: 2022-11-06 | End: 2022-11-16

## 2022-11-06 RX ORDER — PREDNISONE 20 MG/1
TABLET ORAL
COMMUNITY
Start: 2022-11-02 | End: 2022-12-19

## 2022-11-06 RX ORDER — CODEINE PHOSPHATE AND GUAIFENESIN 10; 100 MG/5ML; MG/5ML
2 SOLUTION ORAL EVERY 6 HOURS PRN
Qty: 118 ML | Refills: 0 | Status: SHIPPED | OUTPATIENT
Start: 2022-11-06 | End: 2022-12-19

## 2022-11-06 RX ORDER — PREDNISONE 20 MG/1
TABLET ORAL
Qty: 20 TABLET | Refills: 0 | Status: SHIPPED | OUTPATIENT
Start: 2022-11-06 | End: 2022-12-19

## 2022-11-06 NOTE — PROGRESS NOTES
SUBJECTIVE:   Felisha Gorman is a 62 year old female presenting with a chief complaint of cough, SOB.  Having more concern for sinus infection - has more pressure all over her face.  Nose has been congested  Onset of symptoms was 10 day(s) ago.  Course of illness is worsening.    Severity moderate  Current and Associated symptoms: cough, chest heaviness  Treatment measures tried include prednisone, sudafed, cough drops.  Predisposing factors include HX of asthma.    Seen at [facility] on 11/2 - RX prednisone and RX albuterol inhaler, COVID test negative    Completed COVID vaccinations, boosted    Usually responds well with prednisone in the past but not this time.      Past Medical History:   Diagnosis Date     Allergy, unspecified not elsewhere classified      Dysthymic disorder      Essential hypertension, benign      flight anxiety      Frequent BRONCHITIS     usually in winter     HSV (herpes simplex virus) anogenital infection      Impaired fasting glucose 9/23/2007    Glucose 101 9/07     Left anterior hemiblock      Mild intermittent asthma     URI induced     Obstructive sleep apnea (adult) (pediatric)     not on CPAP; feels rested 2014     Other and unspecified hyperlipidemia      Palpitations     PVC and PVC bigmeny     Rheumatic fever without mention of heart involvement     no sequelae     transient neurologic sx 2005    ? TIA; had MRI with temporal lobe lesion that is getting smaller     Current Outpatient Medications   Medication Sig Dispense Refill     acyclovir (ZOVIRAX) 400 MG tablet TAKE 1 TABLET(400 MG) BY MOUTH THREE TIMES DAILY FOR 7 DAYS 21 tablet 1     albuterol (VENTOLIN HFA) 108 (90 Base) MCG/ACT inhaler Inhale 2 puffs into the lungs every 6 hours 18 g 3     ALPRAZolam (XANAX) 0.5 MG tablet Take 1 tablet (0.5 mg) by mouth 3 times daily as needed for anxiety 20 tablet 0     clobetasol (TEMOVATE) 0.05 % external ointment Apply topically 2 times daily . 2 weeks on, 2 weeks off. External  genitalia only. 60 g 1     fluticasone (FLOVENT HFA) 220 MCG/ACT inhaler Inhale 2 puffs into the lungs 2 times daily 2 puffs bid and rinse and spit after use 1 Inhaler 3     losartan (COZAAR) 25 MG tablet Take 1 tablet (25 mg) by mouth daily 90 tablet 0     propranolol (INDERAL) 10 MG tablet Take 1-2 tablets (10-20 mg) by mouth daily as needed (palpitations) 30 tablet 0     predniSONE (DELTASONE) 20 MG tablet        Social History     Tobacco Use     Smoking status: Former     Packs/day: 2.00     Years: 10.00     Pack years: 20.00     Types: Cigarettes     Quit date: 1986     Years since quittin.8     Smokeless tobacco: Never   Substance Use Topics     Alcohol use: Yes     Alcohol/week: 0.0 - 0.8 standard drinks     Comment: 1 glass of wine every other week        ROS:  Review of systems negative except as stated above.    OBJECTIVE:  BP (!) 174/95   Pulse 70   Temp 97.8  F (36.6  C)   Resp 14   LMP 2006   SpO2 97%   GENERAL APPEARANCE: healthy, alert and no distress  EYES: EOMI,  PERRL, conjunctiva clear  HENT: ear canals and TM's normal.  Nose and mouth without ulcers, erythema or lesions  RESP: lungs with few basilar rhonchi, no crackles or wheezes  CV: regular rates and rhythm, normal S1 S2, no murmur noted  PSYCH: mentation appears normal and affect normal/bright    CXR - no acute infiltrate, no pleural effusion, no pneumothorax personally viewed by me      ASSESSMENT/PLAN:  (R05.1) Acute cough  (primary encounter diagnosis)  Plan: XR Chest 2 Views, guaiFENesin-codeine         (ROBITUSSIN AC) 100-10 MG/5ML solution            (R06.02) SOB (shortness of breath)  Plan: XR Chest 2 Views, predniSONE (DELTASONE) 20 MG         tablet            (J01.90) Acute sinusitis with symptoms > 10 days  Plan: predniSONE (DELTASONE) 20 MG tablet,         doxycycline hyclate (VIBRAMYCIN) 100 MG capsule            (J22) Lower respiratory tract infection  Plan: predniSONE (DELTASONE) 20 MG tablet,          doxycycline hyclate (VIBRAMYCIN) 100 MG capsule            Reassurance given, reviewed symptomatic treatment with tylenol, ibuprofen, plenty of fluids and rest.  RX Doxycycline given for treatment for both sinus infection and bronchitis/lower respiratory tract infection due to worsening and prolong symptoms.  Encourage to continue with inhaler, RX prednisone taper to replace current prednisone dose.  RX vangie AC given to help with cough, best at bedtime.  Will follow up on formal Xray report and notify if any abnormalities    Follow up with primary provider if no improvement of symptoms in 1 week    Toribio Ward MD  November 6, 2022 12:53 PM

## 2022-12-06 ENCOUNTER — HOSPITAL ENCOUNTER (EMERGENCY)
Facility: CLINIC | Age: 62
Discharge: LEFT BEFORE DISCHARGE | End: 2022-12-06
Admitting: EMERGENCY MEDICINE
Payer: COMMERCIAL

## 2022-12-06 ENCOUNTER — APPOINTMENT (OUTPATIENT)
Dept: GENERAL RADIOLOGY | Facility: CLINIC | Age: 62
End: 2022-12-06
Attending: EMERGENCY MEDICINE
Payer: COMMERCIAL

## 2022-12-06 VITALS
SYSTOLIC BLOOD PRESSURE: 184 MMHG | HEART RATE: 75 BPM | DIASTOLIC BLOOD PRESSURE: 85 MMHG | OXYGEN SATURATION: 97 % | TEMPERATURE: 98.4 F | RESPIRATION RATE: 20 BRPM

## 2022-12-06 PROCEDURE — 999N000104 HC STATISTIC NO CHARGE

## 2022-12-06 PROCEDURE — 74018 RADEX ABDOMEN 1 VIEW: CPT

## 2022-12-07 ENCOUNTER — OFFICE VISIT (OUTPATIENT)
Dept: CARDIOLOGY | Facility: CLINIC | Age: 62
End: 2022-12-07
Payer: COMMERCIAL

## 2022-12-07 VITALS
BODY MASS INDEX: 30.07 KG/M2 | SYSTOLIC BLOOD PRESSURE: 146 MMHG | HEART RATE: 72 BPM | OXYGEN SATURATION: 99 % | HEIGHT: 69 IN | WEIGHT: 203 LBS | DIASTOLIC BLOOD PRESSURE: 80 MMHG

## 2022-12-07 DIAGNOSIS — I10 BENIGN ESSENTIAL HYPERTENSION: Primary | ICD-10-CM

## 2022-12-07 DIAGNOSIS — R00.2 PALPITATIONS: ICD-10-CM

## 2022-12-07 DIAGNOSIS — E78.5 HYPERLIPIDEMIA LDL GOAL <100: ICD-10-CM

## 2022-12-07 PROCEDURE — 99215 OFFICE O/P EST HI 40 MIN: CPT | Performed by: INTERNAL MEDICINE

## 2022-12-07 RX ORDER — NEBIVOLOL 5 MG/1
5 TABLET ORAL DAILY
Qty: 30 TABLET | Refills: 4 | Status: SHIPPED | OUTPATIENT
Start: 2022-12-07 | End: 2022-12-15

## 2022-12-07 NOTE — ED TRIAGE NOTES
Arrives with complaints of abdominal pain and bloating since Sunday. Had 2 bowel movements since Sunday. Patient reports she can't sleep due to being uncomfortable.

## 2022-12-07 NOTE — LETTER
12/7/2022    [facility] [place]  [address]  [place] [address]    RE: Felisha Gorman       Dear Colleague,     I had the pleasure of seeing Felisha Gorman in the [facility].  HPI and Plan:   See dictation    Orders Placed This Encounter   Procedures     Lipid Profile     Basic metabolic panel     Follow-Up with Cardiology     Orders Placed This Encounter   Medications     nebivolol (BYSTOLIC) 5 MG tablet     Sig: Take 1 tablet (5 mg) by mouth daily     Dispense:  30 tablet     Refill:  4     There are no discontinued medications.      Encounter Diagnoses   Name Primary?     Benign essential hypertension Yes     Hyperlipidemia LDL goal <100      Palpitations        CURRENT MEDICATIONS:  Current Outpatient Medications   Medication Sig Dispense Refill     albuterol (VENTOLIN HFA) 108 (90 Base) MCG/ACT inhaler Inhale 2 puffs into the lungs every 6 hours 18 g 3     losartan (COZAAR) 25 MG tablet Take 1 tablet (25 mg) by mouth daily 90 tablet 0     nebivolol (BYSTOLIC) 5 MG tablet Take 1 tablet (5 mg) by mouth daily 30 tablet 4     acyclovir (ZOVIRAX) 400 MG tablet TAKE 1 TABLET(400 MG) BY MOUTH THREE TIMES DAILY FOR 7 DAYS (Patient not taking: Reported on 12/7/2022) 21 tablet 1     ALPRAZolam (XANAX) 0.5 MG tablet Take 1 tablet (0.5 mg) by mouth 3 times daily as needed for anxiety (Patient not taking: Reported on 12/7/2022) 20 tablet 0     clobetasol (TEMOVATE) 0.05 % external ointment Apply topically 2 times daily . 2 weeks on, 2 weeks off. External genitalia only. (Patient not taking: Reported on 12/7/2022) 60 g 1     fluticasone (FLOVENT HFA) 220 MCG/ACT inhaler Inhale 2 puffs into the lungs 2 times daily 2 puffs bid and rinse and spit after use (Patient not taking: Reported on 12/7/2022) 1 Inhaler 3     guaiFENesin-codeine (ROBITUSSIN AC) 100-10 MG/5ML solution Take 10 mLs by mouth every 6 hours as needed for cough (Patient not taking: Reported on 12/7/2022) 118 mL 0      predniSONE (DELTASONE) 20 MG tablet  (Patient not taking: Reported on 12/7/2022)       predniSONE (DELTASONE) 20 MG tablet Take 3 tabs by mouth daily x 3 days, then 2 tabs daily x 3 days, then 1 tab daily x 3 days, then 1/2 tab daily x 3 days. (Patient not taking: Reported on 12/7/2022) 20 tablet 0     propranolol (INDERAL) 10 MG tablet Take 1-2 tablets (10-20 mg) by mouth daily as needed (palpitations) (Patient not taking: Reported on 12/7/2022) 30 tablet 0       ALLERGIES     Allergies   Allergen Reactions     Atorvastatin      upset     Penicillin [Penicillins] Rash       PAST MEDICAL HISTORY:  Past Medical History:   Diagnosis Date     Allergy, unspecified not elsewhere classified      Dysthymic disorder      Essential hypertension, benign      flight anxiety      Frequent BRONCHITIS     usually in winter     HSV (herpes simplex virus) anogenital infection      Impaired fasting glucose 9/23/2007    Glucose 101 9/07     Left anterior hemiblock      Mild intermittent asthma     URI induced     Obstructive sleep apnea (adult) (pediatric)     not on CPAP; feels rested 2014     Other and unspecified hyperlipidemia      Palpitations     PVC and PVC bigmeny     Rheumatic fever without mention of heart involvement     no sequelae     transient neurologic sx 2005    ? TIA; had MRI with temporal lobe lesion that is getting smaller       PAST SURGICAL HISTORY:  Past Surgical History:   Procedure Laterality Date     COLONOSCOPY  07/2009    hyperplastic polyp; repeat 10 years     COLONOSCOPY  12/2020    repeat 5 years; tubular adenoma     SURGICAL HISTORY OF -   01/2006    endometrial ablation     SURGICAL HISTORY OF -   12/18/17    bilateral breast reduction and tummy tuck     TONSILLECTOMY       [facility] NONSPECIFIC PROCEDURE  01/2002    nasal polyp removed       FAMILY HISTORY:  Family History   Problem Relation Age of Onset     C.AREKHA. Brother         age 50     Heart Disease Brother      CRadhaAREKHA. Mother      Heart Disease  "Mother      Cancer Mother         ovarian OK now     Cancer - colorectal Mother         cancerous polyp     Dementia Mother      C.A.D. Father         MI     Heart Disease Father        SOCIAL HISTORY:  Social History     Socioeconomic History     Marital status:      Spouse name: Dorian     Number of children: 1     Years of education: None     Highest education level: None   Occupational History     Occupation:       Employer: [employer]   Tobacco Use     Smoking status: Former     Packs/day: 2.00     Years: 10.00     Pack years: 20.00     Types: Cigarettes     Quit date: 1986     Years since quittin.9     Smokeless tobacco: Never   Substance and Sexual Activity     Alcohol use: Not Currently     Alcohol/week: 0.0 - 0.8 standard drinks     Drug use: No     Sexual activity: Yes     Partners: Male     Birth control/protection: Condom   Other Topics Concern     Special Diet No     Comment: stress eating recently     Exercise No     Parent/sibling w/ CABG, MI or angioplasty before 65F 55M? Yes       Review of Systems:  Skin:        Eyes:       ENT:       Respiratory:  Positive for cough  Cardiovascular:    Positive for;palpitations;dizziness  Gastroenterology:      Genitourinary:       Musculoskeletal:       Neurologic:       Psychiatric:       Heme/Lymph/Imm:       Endocrine:         Physical Exam:  Vitals: BP (!) 146/80 (BP Location: Right arm, Patient Position: Sitting, Cuff Size: Adult Regular)   Pulse 72   Ht 1.753 m (5' 9\")   Wt 92.1 kg (203 lb)   LMP 2006   SpO2 99%   BMI 29.98 kg/m      Constitutional:  cooperative, alert and oriented, well developed, well nourished, in no acute distress        Skin:  warm and dry to the touch, no apparent skin lesions or masses noted          Head:  normocephalic, no masses or lesions        Eyes:  pupils equal and round, conjunctivae and lids unremarkable, sclera white, no xanthalasma, EOMS intact, no nystagmus  "       Lymph:No Cervical lymphadenopathy present;No thyromegaly     ENT:           Neck:  carotid pulses are full and equal bilaterally, JVP normal, no carotid bruit        Respiratory:  normal breath sounds, clear to auscultation, normal A-P diameter, normal symmetry, normal respiratory excursion, no use of accessory muscles         Cardiac: regular rhythm, normal S1/S2, no S3 or S4, apical impulse not displaced, no murmurs, gallops or rubs             minimal if any syst murmur supine and standing  pulses full and equal, no bruits auscultated                                        GI:  abdomen soft, non-tender, BS normoactive, no mass, no HSM, no bruits        Extremities and Muscular Skeletal:  no deformities, clubbing, cyanosis, erythema observed;no edema              Neurological:  no gross motor deficits        Psych:  Alert and Oriented x 3      Recent Lab Results:  LIPID RESULTS:  Lab Results   Component Value Date    CHOL 220 (H) 04/30/2021    HDL 45 (L) 04/30/2021     (H) 04/30/2021    TRIG 152 (H) 04/30/2021    CHOLHDLRATIO 5.5 (H) 03/25/2014       LIVER ENZYME RESULTS:  Lab Results   Component Value Date    AST 15 01/13/2020    ALT 9 01/28/2020       CBC RESULTS:  Lab Results   Component Value Date    WBC 11.7 (H) 07/23/2022    WBC 12.1 (H) 01/01/2021    RBC 4.88 07/23/2022    RBC 4.86 01/01/2021    HGB 13.9 07/23/2022    HGB 14.7 01/01/2021    HCT 44.1 07/23/2022    HCT 45.5 01/01/2021    MCV 90 07/23/2022    MCV 94 01/01/2021    MCH 28.5 07/23/2022    MCH 30.2 01/01/2021    MCHC 31.5 07/23/2022    MCHC 32.3 01/01/2021    RDW 13.8 07/23/2022    RDW 12.9 01/01/2021     07/23/2022     01/01/2021       BMP RESULTS:  Lab Results   Component Value Date     07/23/2022     01/01/2021    POTASSIUM 3.5 07/23/2022    POTASSIUM 3.6 01/01/2021    CHLORIDE 109 07/23/2022    CHLORIDE 109 01/01/2021    CO2 24 07/23/2022    CO2 25 01/01/2021    ANIONGAP 5 07/23/2022    ANIONGAP 7  01/01/2021     (H) 07/23/2022    GLC 84 04/30/2021    BUN 25 07/23/2022    BUN 21 01/01/2021    CR 0.76 07/23/2022    CR 0.76 01/01/2021    GFRESTIMATED 87 07/23/2022    GFRESTIMATED 83 01/01/2021    GFRESTBLACK >90 01/01/2021    JACK 8.8 07/23/2022    JACK 8.9 01/01/2021        A1C RESULTS:  Lab Results   Component Value Date    A1C 5.2 04/30/2021       INR RESULTS:  Lab Results   Component Value Date    INR 1.09 06/16/2005           CC  Referred Self, MD  No address on file    Service Date: 12/07/2022    Felisha Guajardo is a very pleasant 62-year-old woman.  I met her in 2020 and that was actually the last time I saw her, although we had a telephone visit in early 2021.  She did not follow up.  She was referred in back then for high blood pressure and unifocal PVCs, probably coming from the RV outflow tract.  We also noted hyperlipidemia.  We planned on doing a stress test and additional treatment, but she had a number of issues separate from her heart and so, therefore, lost track and she is now back. Since I saw her originally in 2020, she has also developed COVID in June of this year, had an ankle injury so she was not able to walk as well.  She had some upper respiratory infections, treated with doxycycline and prednisone.  She has had GI upset.  She describes near syncopal event first thing in the morning when she gets out of bed but never during the day when she is up and active.  Tests were done including an echocardiogram, which we reviewed the actual pictures today.  She has mild LVH. She has a hyperdynamic left ventricle and a trivial intracavitary gradient.  On my exam at most I hear a trivial murmur at the base of the heart, both supine and standing.  It does not increase with standing, so it is not classic IHSS.  Her lipid numbers were all abnormal and we talked about going on a cholesterol pill, but she did not come back for that, so that is another issue.  We also recommended getting a stress test  because of her symptoms of anxiety, feeling poorly, lightheadedness first thing in the morning, etc.  Basically, we are going recapitulate what we did in 2020.  I am going to start her on nebivolol 5 mg a day.  She will take that in the morning and we will see if that helps with the PVCs and with her blood pressure and perhaps would limit her hyperdynamic left ventricle a little bit.  Her TSH has been checked several times.  It is normal. We may want to check serum catecholamines for a pheo but I do not think we need to do that quite yet. After we get the blood pressure controlled, which may be an additional dose of nebivolol or additional dose of losartan as needed, we will try to avoid diuretics, I will then recommend a stress echocardiogram to make sure heart function is normal and she does not develop a gradient with exercise.  I am partially wondering if that is what some of the issue is here.  She has a number of other issues.  She had GI upset and also clear anxiety.  The nebivolol might help a little bit with anxiety since it is a beta adrenaline blocker, but I really refer her back to Dr. Magallon well and perhaps SSRI drugs might be useful here.    Today's visit was a very long visit.  Basically, it was a completely new consult for me.  The visit time was 55 minutes.    Daniel Griffith MD    cc:  Shawna Magallon MD  [facility]  [address] [place]  [address]      Daniel Griffith MD        D: 2022   T: 2022   MT: ye    Name:     ISAAC JORGENSEN  MRN:      [id number]        Account:      [id number]   :      1960           Service Date: 2022       Document: [id number]      Thank you for allowing me to participate in the care of your patient.      Sincerely,     Daniel Griffith MD     [facility]  cc:   Referred Self,

## 2022-12-07 NOTE — PROGRESS NOTES
HPI and Plan:   See dictation    Orders Placed This Encounter   Procedures     Lipid Profile     Basic metabolic panel     Follow-Up with Cardiology     Orders Placed This Encounter   Medications     nebivolol (BYSTOLIC) 5 MG tablet     Sig: Take 1 tablet (5 mg) by mouth daily     Dispense:  30 tablet     Refill:  4     There are no discontinued medications.      Encounter Diagnoses   Name Primary?     Benign essential hypertension Yes     Hyperlipidemia LDL goal <100      Palpitations        CURRENT MEDICATIONS:  Current Outpatient Medications   Medication Sig Dispense Refill     albuterol (VENTOLIN HFA) 108 (90 Base) MCG/ACT inhaler Inhale 2 puffs into the lungs every 6 hours 18 g 3     losartan (COZAAR) 25 MG tablet Take 1 tablet (25 mg) by mouth daily 90 tablet 0     nebivolol (BYSTOLIC) 5 MG tablet Take 1 tablet (5 mg) by mouth daily 30 tablet 4     acyclovir (ZOVIRAX) 400 MG tablet TAKE 1 TABLET(400 MG) BY MOUTH THREE TIMES DAILY FOR 7 DAYS (Patient not taking: Reported on 12/7/2022) 21 tablet 1     ALPRAZolam (XANAX) 0.5 MG tablet Take 1 tablet (0.5 mg) by mouth 3 times daily as needed for anxiety (Patient not taking: Reported on 12/7/2022) 20 tablet 0     clobetasol (TEMOVATE) 0.05 % external ointment Apply topically 2 times daily . 2 weeks on, 2 weeks off. External genitalia only. (Patient not taking: Reported on 12/7/2022) 60 g 1     fluticasone (FLOVENT HFA) 220 MCG/ACT inhaler Inhale 2 puffs into the lungs 2 times daily 2 puffs bid and rinse and spit after use (Patient not taking: Reported on 12/7/2022) 1 Inhaler 3     guaiFENesin-codeine (ROBITUSSIN AC) 100-10 MG/5ML solution Take 10 mLs by mouth every 6 hours as needed for cough (Patient not taking: Reported on 12/7/2022) 118 mL 0     predniSONE (DELTASONE) 20 MG tablet  (Patient not taking: Reported on 12/7/2022)       predniSONE (DELTASONE) 20 MG tablet Take 3 tabs by mouth daily x 3 days, then 2 tabs daily x 3 days, then 1 tab daily x 3 days, then  1/2 tab daily x 3 days. (Patient not taking: Reported on 12/7/2022) 20 tablet 0     propranolol (INDERAL) 10 MG tablet Take 1-2 tablets (10-20 mg) by mouth daily as needed (palpitations) (Patient not taking: Reported on 12/7/2022) 30 tablet 0       ALLERGIES     Allergies   Allergen Reactions     Atorvastatin      upset     Penicillin [Penicillins] Rash       PAST MEDICAL HISTORY:  Past Medical History:   Diagnosis Date     Allergy, unspecified not elsewhere classified      Dysthymic disorder      Essential hypertension, benign      flight anxiety      Frequent BRONCHITIS     usually in winter     HSV (herpes simplex virus) anogenital infection      Impaired fasting glucose 9/23/2007    Glucose 101 9/07     Left anterior hemiblock      Mild intermittent asthma     URI induced     Obstructive sleep apnea (adult) (pediatric)     not on CPAP; feels rested 2014     Other and unspecified hyperlipidemia      Palpitations     PVC and PVC bigmeny     Rheumatic fever without mention of heart involvement     no sequelae     transient neurologic sx 2005    ? TIA; had MRI with temporal lobe lesion that is getting smaller       PAST SURGICAL HISTORY:  Past Surgical History:   Procedure Laterality Date     COLONOSCOPY  07/2009    hyperplastic polyp; repeat 10 years     COLONOSCOPY  12/2020    repeat 5 years; tubular adenoma     SURGICAL HISTORY OF -   01/2006    endometrial ablation     SURGICAL HISTORY OF -   12/18/17    bilateral breast reduction and tummy tuck     TONSILLECTOMY       [facility] NONSPECIFIC PROCEDURE  01/2002    nasal polyp removed       FAMILY HISTORY:  Family History   Problem Relation Age of Onset     C.A.D. Brother         age 50     Heart Disease Brother      C.A.D. Mother      Heart Disease Mother      Cancer Mother         ovarian OK now     Cancer - colorectal Mother         cancerous polyp     Dementia Mother      C.A.D. Father         MI     Heart Disease Father        SOCIAL HISTORY:  Social History  Principal Discharge DX:	Bladder tumor  Goal:	pain control  Instructions for follow-up, activity and diet:	Call the office if you have fever greater than 101, difficulty urinating, pain not relieved with pain medication, nausea/vomiting. Home with zimmerman to [facility]bag.  Secondary Diagnosis:	Hypertension  Instructions for follow-up, activity and diet:	continue home medication  Secondary Diagnosis:	SVT (supraventricular tachycardia)  Instructions for follow-up, activity and diet:	Follow up with cardiology for full work up including echocardiogram.   Information for Dr. Aranda provided "    Socioeconomic History     Marital status:      Spouse name: Dorian     Number of children: 1     Years of education: None     Highest education level: None   Occupational History     Occupation:       Employer: [employer]   Tobacco Use     Smoking status: Former     Packs/day: 2.00     Years: 10.00     Pack years: 20.00     Types: Cigarettes     Quit date: 1986     Years since quittin.9     Smokeless tobacco: Never   Substance and Sexual Activity     Alcohol use: Not Currently     Alcohol/week: 0.0 - 0.8 standard drinks     Drug use: No     Sexual activity: Yes     Partners: Male     Birth control/protection: Condom   Other Topics Concern     Special Diet No     Comment: stress eating recently     Exercise No     Parent/sibling w/ CABG, MI or angioplasty before 65F 55M? Yes       Review of Systems:  Skin:        Eyes:       ENT:       Respiratory:  Positive for cough  Cardiovascular:    Positive for;palpitations;dizziness  Gastroenterology:      Genitourinary:       Musculoskeletal:       Neurologic:       Psychiatric:       Heme/Lymph/Imm:       Endocrine:         Physical Exam:  Vitals: BP (!) 146/80 (BP Location: Right arm, Patient Position: Sitting, Cuff Size: Adult Regular)   Pulse 72   Ht 1.753 m (5' 9\")   Wt 92.1 kg (203 lb)   LMP 2006   SpO2 99%   BMI 29.98 kg/m      Constitutional:  cooperative, alert and oriented, well developed, well nourished, in no acute distress        Skin:  warm and dry to the touch, no apparent skin lesions or masses noted          Head:  normocephalic, no masses or lesions        Eyes:  pupils equal and round, conjunctivae and lids unremarkable, sclera white, no xanthalasma, EOMS intact, no nystagmus        Lymph:No Cervical lymphadenopathy present;No thyromegaly     ENT:           Neck:  carotid pulses are full and equal bilaterally, JVP normal, no carotid bruit        Respiratory:  normal breath sounds, clear to " auscultation, normal A-P diameter, normal symmetry, normal respiratory excursion, no use of accessory muscles         Cardiac: regular rhythm, normal S1/S2, no S3 or S4, apical impulse not displaced, no murmurs, gallops or rubs             minimal if any syst murmur supine and standing  pulses full and equal, no bruits auscultated                                        GI:  abdomen soft, non-tender, BS normoactive, no mass, no HSM, no bruits        Extremities and Muscular Skeletal:  no deformities, clubbing, cyanosis, erythema observed;no edema              Neurological:  no gross motor deficits        Psych:  Alert and Oriented x 3      Recent Lab Results:  LIPID RESULTS:  Lab Results   Component Value Date    CHOL 220 (H) 04/30/2021    HDL 45 (L) 04/30/2021     (H) 04/30/2021    TRIG 152 (H) 04/30/2021    CHOLHDLRATIO 5.5 (H) 03/25/2014       LIVER ENZYME RESULTS:  Lab Results   Component Value Date    AST 15 01/13/2020    ALT 9 01/28/2020       CBC RESULTS:  Lab Results   Component Value Date    WBC 11.7 (H) 07/23/2022    WBC 12.1 (H) 01/01/2021    RBC 4.88 07/23/2022    RBC 4.86 01/01/2021    HGB 13.9 07/23/2022    HGB 14.7 01/01/2021    HCT 44.1 07/23/2022    HCT 45.5 01/01/2021    MCV 90 07/23/2022    MCV 94 01/01/2021    MCH 28.5 07/23/2022    MCH 30.2 01/01/2021    MCHC 31.5 07/23/2022    MCHC 32.3 01/01/2021    RDW 13.8 07/23/2022    RDW 12.9 01/01/2021     07/23/2022     01/01/2021       BMP RESULTS:  Lab Results   Component Value Date     07/23/2022     01/01/2021    POTASSIUM 3.5 07/23/2022    POTASSIUM 3.6 01/01/2021    CHLORIDE 109 07/23/2022    CHLORIDE 109 01/01/2021    CO2 24 07/23/2022    CO2 25 01/01/2021    ANIONGAP 5 07/23/2022    ANIONGAP 7 01/01/2021     (H) 07/23/2022    GLC 84 04/30/2021    BUN 25 07/23/2022    BUN 21 01/01/2021    CR 0.76 07/23/2022    CR 0.76 01/01/2021    GFRESTIMATED 87 07/23/2022    GFRESTIMATED 83 01/01/2021    GFRESTBLACK >90  01/01/2021    JACK 8.8 07/23/2022    JACK 8.9 01/01/2021        A1C RESULTS:  Lab Results   Component Value Date    A1C 5.2 04/30/2021       INR RESULTS:  Lab Results   Component Value Date    INR 1.09 06/16/2005           CC  Referred Self, MD  No address on file

## 2022-12-07 NOTE — PROGRESS NOTES
Service Date: 12/07/2022    Felisha Guajardo is a very pleasant 62-year-old woman.  I met her in 2020 and that was actually the last time I saw her, although we had a telephone visit in early 2021.  She did not follow up.  She was referred in back then for high blood pressure and unifocal PVCs, probably coming from the RV outflow tract.  We also noted hyperlipidemia.  We planned on doing a stress test and additional treatment, but she had a number of issues separate from her heart and so, therefore, lost track and she is now back. Since I saw her originally in 2020, she has also developed COVID in June of this year, had an ankle injury so she was not able to walk as well.  She had some upper respiratory infections, treated with doxycycline and prednisone.  She has had GI upset.  She describes near syncopal event first thing in the morning when she gets out of bed but never during the day when she is up and active.  Tests were done including an echocardiogram, which we reviewed the actual pictures today.  She has mild LVH. She has a hyperdynamic left ventricle and a trivial intracavitary gradient.  On my exam at most I hear a trivial murmur at the base of the heart, both supine and standing.  It does not increase with standing, so it is not classic IHSS.  Her lipid numbers were all abnormal and we talked about going on a cholesterol pill, but she did not come back for that, so that is another issue.  We also recommended getting a stress test because of her symptoms of anxiety, feeling poorly, lightheadedness first thing in the morning, etc.  Basically, we are going recapitulate what we did in 2020.  I am going to start her on nebivolol 5 mg a day.  She will take that in the morning and we will see if that helps with the PVCs and with her blood pressure and perhaps would limit her hyperdynamic left ventricle a little bit.  Her TSH has been checked several times.  It is normal. We may want to check serum catecholamines for  a pheo but I do not think we need to do that quite yet. After we get the blood pressure controlled, which may be an additional dose of nebivolol or additional dose of losartan as needed, we will try to avoid diuretics, I will then recommend a stress echocardiogram to make sure heart function is normal and she does not develop a gradient with exercise.  I am partially wondering if that is what some of the issue is here.  She has a number of other issues.  She had GI upset and also clear anxiety.  The nebivolol might help a little bit with anxiety since it is a beta adrenaline blocker, but I really refer her back to Dr. Magallon well and perhaps SSRI drugs might be useful here.    Today's visit was a very long visit.  Basically, it was a completely new consult for me.  The visit time was 55 minutes.    Daniel Griffith MD    cc:  Shawna Magallon MD  [facility]  [address] [place]  [address]      Daniel Griffith MD        D: 2022   T: 2022   MT: ye    Name:     ISAAC JORGENSEN  MRN:      [id number]        Account:      [id number]   :      1960           Service Date: 2022       Document: [id number]

## 2022-12-14 ENCOUNTER — LAB (OUTPATIENT)
Dept: LAB | Facility: CLINIC | Age: 62
End: 2022-12-14
Payer: COMMERCIAL

## 2022-12-14 ENCOUNTER — TELEPHONE (OUTPATIENT)
Dept: CARDIOLOGY | Facility: CLINIC | Age: 62
End: 2022-12-14

## 2022-12-14 DIAGNOSIS — R00.2 PALPITATIONS: ICD-10-CM

## 2022-12-14 DIAGNOSIS — I10 BENIGN ESSENTIAL HYPERTENSION: ICD-10-CM

## 2022-12-14 DIAGNOSIS — E78.5 HYPERLIPIDEMIA LDL GOAL <100: ICD-10-CM

## 2022-12-14 LAB
ANION GAP SERPL CALCULATED.3IONS-SCNC: 9 MMOL/L (ref 7–15)
BUN SERPL-MCNC: 14.3 MG/DL (ref 8–23)
CALCIUM SERPL-MCNC: 9.2 MG/DL (ref 8.8–10.2)
CHLORIDE SERPL-SCNC: 105 MMOL/L (ref 98–107)
CHOLEST SERPL-MCNC: 234 MG/DL
CREAT SERPL-MCNC: 0.79 MG/DL (ref 0.51–0.95)
DEPRECATED HCO3 PLAS-SCNC: 27 MMOL/L (ref 22–29)
GFR SERPL CREATININE-BSD FRML MDRD: 83 ML/MIN/1.73M2
GLUCOSE SERPL-MCNC: 91 MG/DL (ref 70–99)
HDLC SERPL-MCNC: 40 MG/DL
LDLC SERPL CALC-MCNC: 145 MG/DL
NONHDLC SERPL-MCNC: 194 MG/DL
POTASSIUM SERPL-SCNC: 3.6 MMOL/L (ref 3.4–5.3)
SODIUM SERPL-SCNC: 141 MMOL/L (ref 136–145)
TRIGL SERPL-MCNC: 244 MG/DL

## 2022-12-14 PROCEDURE — 80061 LIPID PANEL: CPT | Performed by: INTERNAL MEDICINE

## 2022-12-14 PROCEDURE — 80048 BASIC METABOLIC PNL TOTAL CA: CPT | Performed by: INTERNAL MEDICINE

## 2022-12-14 PROCEDURE — 36415 COLL VENOUS BLD VENIPUNCTURE: CPT | Performed by: INTERNAL MEDICINE

## 2022-12-14 NOTE — TELEPHONE ENCOUNTER
Called Pt asked her to check good RX for prices at pharmacies and will send RX to what ever pharmacy works. If unable to find med will discuss with provider different beta blocker. JEFRY Rodgers RN

## 2022-12-15 DIAGNOSIS — I10 BENIGN ESSENTIAL HYPERTENSION: ICD-10-CM

## 2022-12-15 RX ORDER — NEBIVOLOL 5 MG/1
5 TABLET ORAL DAILY
Qty: 30 TABLET | Refills: 4 | Status: SHIPPED | OUTPATIENT
Start: 2022-12-15 | End: 2023-05-19

## 2022-12-15 NOTE — PROGRESS NOTES
[facility] Refill Guideline reviewed.  Medication meets criteria for refill. Sent to new pharmacy that will order medication and take good RX medication. JEFRY Rodgers RN

## 2022-12-19 ENCOUNTER — OFFICE VISIT (OUTPATIENT)
Dept: CARDIOLOGY | Facility: CLINIC | Age: 62
End: 2022-12-19
Payer: COMMERCIAL

## 2022-12-19 VITALS
SYSTOLIC BLOOD PRESSURE: 110 MMHG | DIASTOLIC BLOOD PRESSURE: 70 MMHG | HEART RATE: 60 BPM | HEIGHT: 69 IN | WEIGHT: 206.2 LBS | BODY MASS INDEX: 30.54 KG/M2 | OXYGEN SATURATION: 97 %

## 2022-12-19 DIAGNOSIS — I10 BENIGN ESSENTIAL HYPERTENSION: ICD-10-CM

## 2022-12-19 DIAGNOSIS — E78.5 HYPERLIPIDEMIA LDL GOAL <100: ICD-10-CM

## 2022-12-19 DIAGNOSIS — I10 HYPERTENSION GOAL BP (BLOOD PRESSURE) < 140/90: ICD-10-CM

## 2022-12-19 DIAGNOSIS — R00.2 PALPITATIONS: ICD-10-CM

## 2022-12-19 PROCEDURE — 99213 OFFICE O/P EST LOW 20 MIN: CPT | Performed by: INTERNAL MEDICINE

## 2022-12-19 RX ORDER — ROSUVASTATIN CALCIUM 10 MG/1
10 TABLET, COATED ORAL DAILY
Qty: 30 TABLET | Refills: 11 | Status: SHIPPED | OUTPATIENT
Start: 2022-12-19 | End: 2022-12-21

## 2022-12-19 RX ORDER — LOSARTAN POTASSIUM 25 MG/1
25 TABLET ORAL DAILY
Qty: 90 TABLET | Refills: 3 | Status: SHIPPED | OUTPATIENT
Start: 2022-12-19 | End: 2024-05-28

## 2022-12-19 NOTE — PROGRESS NOTES
HPI and Plan:   See dictation    Orders Placed This Encounter   Procedures     Lipid Profile     ALT     Follow-Up with Cardiology     Orders Placed This Encounter   Medications     losartan (COZAAR) 25 MG tablet     Sig: Take 1 tablet (25 mg) by mouth daily     Dispense:  90 tablet     Refill:  3     rosuvastatin (CRESTOR) 10 MG tablet     Sig: Take 1 tablet (10 mg) by mouth daily     Dispense:  30 tablet     Refill:  11     Medications Discontinued During This Encounter   Medication Reason     acyclovir (ZOVIRAX) 400 MG tablet Medication Reconciliation Clean Up     guaiFENesin-codeine (ROBITUSSIN AC) 100-10 MG/5ML solution Medication Reconciliation Clean Up     predniSONE (DELTASONE) 20 MG tablet Medication Reconciliation Clean Up     predniSONE (DELTASONE) 20 MG tablet Medication Reconciliation Clean Up     losartan (COZAAR) 25 MG tablet Reorder         Encounter Diagnoses   Name Primary?     Benign essential hypertension      Hyperlipidemia LDL goal <100      Palpitations      Hypertension goal BP (blood pressure) < 140/90        CURRENT MEDICATIONS:  Current Outpatient Medications   Medication Sig Dispense Refill     albuterol (VENTOLIN HFA) 108 (90 Base) MCG/ACT inhaler Inhale 2 puffs into the lungs every 6 hours 18 g 3     ALPRAZolam (XANAX) 0.5 MG tablet Take 1 tablet (0.5 mg) by mouth 3 times daily as needed for anxiety 20 tablet 0     clobetasol (TEMOVATE) 0.05 % external ointment Apply topically 2 times daily . 2 weeks on, 2 weeks off. External genitalia only. 60 g 1     fluticasone (FLOVENT HFA) 220 MCG/ACT inhaler Inhale 2 puffs into the lungs 2 times daily 2 puffs bid and rinse and spit after use 1 Inhaler 3     losartan (COZAAR) 25 MG tablet Take 1 tablet (25 mg) by mouth daily 90 tablet 3     nebivolol (BYSTOLIC) 5 MG tablet Take 1 tablet (5 mg) by mouth daily 30 tablet 4     propranolol (INDERAL) 10 MG tablet Take 1-2 tablets (10-20 mg) by mouth daily as needed (palpitations) 30 tablet 0      rosuvastatin (CRESTOR) 10 MG tablet Take 1 tablet (10 mg) by mouth daily 30 tablet 11       ALLERGIES     Allergies   Allergen Reactions     Atorvastatin      upset     Penicillin [Penicillins] Rash       PAST MEDICAL HISTORY:  Past Medical History:   Diagnosis Date     Allergy, unspecified not elsewhere classified      Dysthymic disorder      Essential hypertension, benign      flight anxiety      Frequent BRONCHITIS     usually in winter     HSV (herpes simplex virus) anogenital infection      Impaired fasting glucose 9/23/2007    Glucose 101 9/07     Left anterior hemiblock      Mild intermittent asthma     URI induced     Obstructive sleep apnea (adult) (pediatric)     not on CPAP; feels rested 2014     Other and unspecified hyperlipidemia      Palpitations     PVC and PVC bigmeny     Rheumatic fever without mention of heart involvement     no sequelae     transient neurologic sx 2005    ? TIA; had MRI with temporal lobe lesion that is getting smaller       PAST SURGICAL HISTORY:  Past Surgical History:   Procedure Laterality Date     COLONOSCOPY  07/2009    hyperplastic polyp; repeat 10 years     COLONOSCOPY  12/2020    repeat 5 years; tubular adenoma     SURGICAL HISTORY OF -   01/2006    endometrial ablation     SURGICAL HISTORY OF -   12/18/17    bilateral breast reduction and tummy tuck     TONSILLECTOMY       ZZC NONSPECIFIC PROCEDURE  01/2002    nasal polyp removed       FAMILY HISTORY:  Family History   Problem Relation Age of Onset     C.A.D. Brother         age 50     Heart Disease Brother      C.A.D. Mother      Heart Disease Mother      Cancer Mother         ovarian [place] now     Cancer - colorectal Mother         cancerous polyp     Dementia Mother      C.A.D. Father         MI     Heart Disease Father        SOCIAL HISTORY:  Social History     Socioeconomic History     Marital status:      Spouse name: Dorian     Number of children: 1     Years of education: None     Highest education level:  "None   Occupational History     Occupation:       Employer: [employer]   Tobacco Use     Smoking status: Former     Packs/day: 2.00     Years: 10.00     Pack years: 20.00     Types: Cigarettes     Quit date: 1986     Years since quittin.0     Smokeless tobacco: Never   Substance and Sexual Activity     Alcohol use: Not Currently     Alcohol/week: 0.0 - 0.8 standard drinks     Drug use: No     Sexual activity: Yes     Partners: Male     Birth control/protection: Condom   Other Topics Concern     Special Diet No     Comment: stress eating recently     Exercise No     Parent/sibling w/ CABG, MI or angioplasty before 65F 55M? Yes       Review of Systems:  Skin:        Eyes:       ENT:       Respiratory:       Cardiovascular:       Gastroenterology:      Genitourinary:       Musculoskeletal:       Neurologic:       Psychiatric:       Heme/Lymph/Imm:       Endocrine:         Physical Exam:  Vitals: /70 (BP Location: Right arm, Patient Position: Sitting, Cuff Size: Adult Regular)   Pulse 60   Ht 1.753 m (5' 9\")   Wt 93.5 kg (206 lb 3.2 oz)   LMP 2006   SpO2 97%   BMI 30.45 kg/m      Constitutional:           Skin:             Head:           Eyes:           Lymph:      ENT:           Neck:           Respiratory:            Cardiac:                                                           GI:           Extremities and Muscular Skeletal:                 Neurological:           Psych:         Recent Lab Results:  LIPID RESULTS:  Lab Results   Component Value Date    CHOL 234 (H) 2022    CHOL 220 (H) 2021    HDL 40 (L) 2022    HDL 45 (L) 2021     (H) 2022     (H) 2021    TRIG 244 (H) 2022    TRIG 152 (H) 2021    CHOLHDLRATIO 5.5 (H) 2014       LIVER ENZYME RESULTS:  Lab Results   Component Value Date    AST 15 2020    ALT 9 2020       CBC RESULTS:  Lab Results   Component Value Date    " WBC 11.7 (H) 07/23/2022    WBC 12.1 (H) 01/01/2021    RBC 4.88 07/23/2022    RBC 4.86 01/01/2021    HGB 13.9 07/23/2022    HGB 14.7 01/01/2021    HCT 44.1 07/23/2022    HCT 45.5 01/01/2021    MCV 90 07/23/2022    MCV 94 01/01/2021    MCH 28.5 07/23/2022    MCH 30.2 01/01/2021    MCHC 31.5 07/23/2022    MCHC 32.3 01/01/2021    RDW 13.8 07/23/2022    RDW 12.9 01/01/2021     07/23/2022     01/01/2021       BMP RESULTS:  Lab Results   Component Value Date     12/14/2022     01/01/2021    POTASSIUM 3.6 12/14/2022    POTASSIUM 3.5 07/23/2022    POTASSIUM 3.6 01/01/2021    CHLORIDE 105 12/14/2022    CHLORIDE 109 07/23/2022    CHLORIDE 109 01/01/2021    CO2 27 12/14/2022    CO2 24 07/23/2022    CO2 25 01/01/2021    ANIONGAP 9 12/14/2022    ANIONGAP 5 07/23/2022    ANIONGAP 7 01/01/2021    GLC 91 12/14/2022     (H) 07/23/2022    GLC 84 04/30/2021    BUN 14.3 12/14/2022    BUN 25 07/23/2022    BUN 21 01/01/2021    CR 0.79 12/14/2022    CR 0.76 01/01/2021    GFRESTIMATED 83 12/14/2022    GFRESTIMATED 83 01/01/2021    GFRESTBLACK >90 01/01/2021    JACK 9.2 12/14/2022    JACK 8.9 01/01/2021        A1C RESULTS:  Lab Results   Component Value Date    A1C 5.2 04/30/2021       INR RESULTS:  Lab Results   Component Value Date    INR 1.09 06/16/2005           MERRILL Griffith MD  [address]  [place] [id number]

## 2022-12-19 NOTE — PROGRESS NOTES
Service Date: 12/19/2022    HISTORY OF PRESENT ILLNESS:  Please see my full consult note dated 12/07/2022.  The patient returns for followup.      I placed her on nebivolol and she states it is amazing at how much better she feels.  She was hypertensive, some anxiety, a type A personality and even though the echo did not read it as hypertrophic obstructive cardiomyopathy, her ejection fraction was greater than 65% and I thought I saw some JESSICA of the mitral valve cords, so as I was wondering if she gets up in the morning and feels lightheaded if it might be partial LV outflow tract obstruction, even though it was not demonstrated on that echo.      She still has a little bit of lightheadedness when she gets out of bed, but her blood pressure has completely normalized now.  She states she feels much calmer and feels much better on the nebivolol.     Today, we went through the 10-year cardiac risk calculator.  If I use her original blood pressure numbers when I saw her a month ago, it put her at a 15% risk.  When I used the current blood pressure number, it is around 6.5%, so she is going to go ahead and start on a statin.  We will start her on Crestor 10 mg daily.  She did have a problem with atorvastatin, so I told her to watch for leg aches, etc.      We will see her back in about 6-8 weeks.  At some point, we may want to do a stress test, although now that she is on nebivolol, she states she really has no symptoms of anything to speak of.  I would be interested in getting an echo probably in a year to see if her ejection fraction is less hyperdynamic.    At this point, I do not think we need to do a pheo workup, even though we talked about that last time.  We looked at the actual echo again today.  We went through the 10-year cardiac risk calculator and reviewed her lipid numbers and we started a new medication.    Today's visit was 27 minutes.    Daniel Griffith MD        D: 12/19/2022   T: 12/19/2022   MT:  JH    Name:     ISAAC JORGENSEN  MRN:      [id number]        Account:      [id number]   :      1960           Service Date: 2022       Document: [id number]

## 2022-12-19 NOTE — LETTER
12/19/2022    [facility] [place]  [address]  [place] [address]    RE: Felisha Gorman       Dear Colleague,     I had the pleasure of seeing Felisha Gorman in the [facility].  HPI and Plan:   See dictation    Orders Placed This Encounter   Procedures     Lipid Profile     ALT     Follow-Up with Cardiology     Orders Placed This Encounter   Medications     losartan (COZAAR) 25 MG tablet     Sig: Take 1 tablet (25 mg) by mouth daily     Dispense:  90 tablet     Refill:  3     rosuvastatin (CRESTOR) 10 MG tablet     Sig: Take 1 tablet (10 mg) by mouth daily     Dispense:  30 tablet     Refill:  11     Medications Discontinued During This Encounter   Medication Reason     acyclovir (ZOVIRAX) 400 MG tablet Medication Reconciliation Clean Up     guaiFENesin-codeine (ROBITUSSIN AC) 100-10 MG/5ML solution Medication Reconciliation Clean Up     predniSONE (DELTASONE) 20 MG tablet Medication Reconciliation Clean Up     predniSONE (DELTASONE) 20 MG tablet Medication Reconciliation Clean Up     losartan (COZAAR) 25 MG tablet Reorder         Encounter Diagnoses   Name Primary?     Benign essential hypertension      Hyperlipidemia LDL goal <100      Palpitations      Hypertension goal BP (blood pressure) < 140/90        CURRENT MEDICATIONS:  Current Outpatient Medications   Medication Sig Dispense Refill     albuterol (VENTOLIN HFA) 108 (90 Base) MCG/ACT inhaler Inhale 2 puffs into the lungs every 6 hours 18 g 3     ALPRAZolam (XANAX) 0.5 MG tablet Take 1 tablet (0.5 mg) by mouth 3 times daily as needed for anxiety 20 tablet 0     clobetasol (TEMOVATE) 0.05 % external ointment Apply topically 2 times daily . 2 weeks on, 2 weeks off. External genitalia only. 60 g 1     fluticasone (FLOVENT HFA) 220 MCG/ACT inhaler Inhale 2 puffs into the lungs 2 times daily 2 puffs bid and rinse and spit after use 1 Inhaler 3     losartan (COZAAR) 25 MG tablet Take 1 tablet (25 mg) by mouth daily 90  tablet 3     nebivolol (BYSTOLIC) 5 MG tablet Take 1 tablet (5 mg) by mouth daily 30 tablet 4     propranolol (INDERAL) 10 MG tablet Take 1-2 tablets (10-20 mg) by mouth daily as needed (palpitations) 30 tablet 0     rosuvastatin (CRESTOR) 10 MG tablet Take 1 tablet (10 mg) by mouth daily 30 tablet 11       ALLERGIES     Allergies   Allergen Reactions     Atorvastatin      upset     Penicillin [Penicillins] Rash       PAST MEDICAL HISTORY:  Past Medical History:   Diagnosis Date     Allergy, unspecified not elsewhere classified      Dysthymic disorder      Essential hypertension, benign      flight anxiety      Frequent BRONCHITIS     usually in winter     HSV (herpes simplex virus) anogenital infection      Impaired fasting glucose 9/23/2007    Glucose 101 9/07     Left anterior hemiblock      Mild intermittent asthma     URI induced     Obstructive sleep apnea (adult) (pediatric)     not on CPAP; feels rested 2014     Other and unspecified hyperlipidemia      Palpitations     PVC and PVC bigmeny     Rheumatic fever without mention of heart involvement     no sequelae     transient neurologic sx 2005    ? TIA; had MRI with temporal lobe lesion that is getting smaller       PAST SURGICAL HISTORY:  Past Surgical History:   Procedure Laterality Date     COLONOSCOPY  07/2009    hyperplastic polyp; repeat 10 years     COLONOSCOPY  12/2020    repeat 5 years; tubular adenoma     SURGICAL HISTORY OF -   01/2006    endometrial ablation     SURGICAL HISTORY OF -   12/18/17    bilateral breast reduction and tummy tuck     TONSILLECTOMY       Z NONSPECIFIC PROCEDURE  01/2002    nasal polyp removed       FAMILY HISTORY:  Family History   Problem Relation Age of Onset     C.A.D. Brother         age 50     Heart Disease Brother      C.A.ARLYN. Mother      Heart Disease Mother      Cancer Mother         ovarian [place] now     Cancer - colorectal Mother         cancerous polyp     Dementia Mother      C.A.D. Father         MI     Heart  "Disease Father        SOCIAL HISTORY:  Social History     Socioeconomic History     Marital status:      Spouse name: Dorian     Number of children: Altaf     Years of education: None     Highest education level: None   Occupational History     Occupation:       Employer: [employer]   Tobacco Use     Smoking status: Former     Packs/day: 2.00     Years: 10.00     Pack years: 20.00     Types: Cigarettes     Quit date: 1986     Years since quittin.0     Smokeless tobacco: Never   Substance and Sexual Activity     Alcohol use: Not Currently     Alcohol/week: 0.0 - 0.8 standard drinks     Drug use: No     Sexual activity: Yes     Partners: Male     Birth control/protection: Condom   Other Topics Concern     Special Diet No     Comment: stress eating recently     Exercise No     Parent/sibling w/ CABG, MI or angioplasty before 65F 55M? Yes       Review of Systems:  Skin:        Eyes:       ENT:       Respiratory:       Cardiovascular:       Gastroenterology:      Genitourinary:       Musculoskeletal:       Neurologic:       Psychiatric:       Heme/Lymph/Imm:       Endocrine:         Physical Exam:  Vitals: /70 (BP Location: Right arm, Patient Position: Sitting, Cuff Size: Adult Regular)   Pulse 60   Ht 1.753 m (5' 9\")   Wt 93.5 kg (206 lb 3.2 oz)   LMP 2006   SpO2 97%   BMI 30.45 kg/m      Constitutional:           Skin:             Head:           Eyes:           Lymph:      ENT:           Neck:           Respiratory:            Cardiac:                                                           GI:           Extremities and Muscular Skeletal:                 Neurological:           Psych:         Recent Lab Results:  LIPID RESULTS:  Lab Results   Component Value Date    CHOL 234 (H) 2022    CHOL 220 (H) 2021    HDL 40 (L) 2022    HDL 45 (L) 2021     (H) 2022     (H) 2021    TRIG 244 (H) 2022    TRIG " 152 (H) 04/30/2021    CHOLHDLRATIO 5.5 (H) 03/25/2014       LIVER ENZYME RESULTS:  Lab Results   Component Value Date    AST 15 01/13/2020    ALT 9 01/28/2020       CBC RESULTS:  Lab Results   Component Value Date    WBC 11.7 (H) 07/23/2022    WBC 12.1 (H) 01/01/2021    RBC 4.88 07/23/2022    RBC 4.86 01/01/2021    HGB 13.9 07/23/2022    HGB 14.7 01/01/2021    HCT 44.1 07/23/2022    HCT 45.5 01/01/2021    MCV 90 07/23/2022    MCV 94 01/01/2021    MCH 28.5 07/23/2022    MCH 30.2 01/01/2021    MCHC 31.5 07/23/2022    MCHC 32.3 01/01/2021    RDW 13.8 07/23/2022    RDW 12.9 01/01/2021     07/23/2022     01/01/2021       BMP RESULTS:  Lab Results   Component Value Date     12/14/2022     01/01/2021    POTASSIUM 3.6 12/14/2022    POTASSIUM 3.5 07/23/2022    POTASSIUM 3.6 01/01/2021    CHLORIDE 105 12/14/2022    CHLORIDE 109 07/23/2022    CHLORIDE 109 01/01/2021    CO2 27 12/14/2022    CO2 24 07/23/2022    CO2 25 01/01/2021    ANIONGAP 9 12/14/2022    ANIONGAP 5 07/23/2022    ANIONGAP 7 01/01/2021    GLC 91 12/14/2022     (H) 07/23/2022    GLC 84 04/30/2021    BUN 14.3 12/14/2022    BUN 25 07/23/2022    BUN 21 01/01/2021    CR 0.79 12/14/2022    CR 0.76 01/01/2021    GFRESTIMATED 83 12/14/2022    GFRESTIMATED 83 01/01/2021    GFRESTBLACK >90 01/01/2021    JACK 9.2 12/14/2022    JACK 8.9 01/01/2021        A1C RESULTS:  Lab Results   Component Value Date    A1C 5.2 04/30/2021       INR RESULTS:  Lab Results   Component Value Date    INR 1.09 06/16/2005           CC  Daniel Griffith MD  [address]  [place] [id number]    Service Date: 12/19/2022    HISTORY OF PRESENT ILLNESS:  Please see my full consult note dated 12/07/2022.  The patient returns for followup.      I placed her on nebivolol and she states it is amazing at how much better she feels.  She was hypertensive, some anxiety, a type A personality and even though the echo did not read it as hypertrophic obstructive  cardiomyopathy, her ejection fraction was greater than 65% and I thought I saw some JESSICA of the mitral valve cords, so as I was wondering if she gets up in the morning and feels lightheaded if it might be partial LV outflow tract obstruction, even though it was not demonstrated on that echo.      She still has a little bit of lightheadedness when she gets out of bed, but her blood pressure has completely normalized now.  She states she feels much calmer and feels much better on the nebivolol.     Today, we went through the 10-year cardiac risk calculator.  If I use her original blood pressure numbers when I saw her a month ago, it put her at a 15% risk.  When I used the current blood pressure number, it is around 6.5%, so she is going to go ahead and start on a statin.  We will start her on Crestor 10 mg daily.  She did have a problem with atorvastatin, so I told her to watch for leg aches, etc.      We will see her back in about 6-8 weeks.  At some point, we may want to do a stress test, although now that she is on nebivolol, she states she really has no symptoms of anything to speak of.  I would be interested in getting an echo probably in a year to see if her ejection fraction is less hyperdynamic.    At this point, I do not think we need to do a pheo workup, even though we talked about that last time.  We looked at the actual echo again today.  We went through the 10-year cardiac risk calculator and reviewed her lipid numbers and we started a new medication.    Today's visit was 27 minutes.    Daniel Griffith MD        D: 2022   T: 2022   MT: TREASURE    Name:     ISAAC JORGENSEN  MRN:      [id number]        Account:      [id number]   :      1960           Service Date: 2022       Document: [id number]      Thank you for allowing me to participate in the care of your patient.      Sincerely,     Daniel Griffith MD     [facility]  cc:   Daniel Griffith MD  [address]  [place] [id number]

## 2022-12-21 DIAGNOSIS — E78.5 HYPERLIPIDEMIA LDL GOAL <100: ICD-10-CM

## 2022-12-21 RX ORDER — ROSUVASTATIN CALCIUM 10 MG/1
10 TABLET, COATED ORAL DAILY
Qty: 90 TABLET | Refills: 0 | Status: SHIPPED | OUTPATIENT
Start: 2022-12-21 | End: 2024-05-28

## 2022-12-21 NOTE — TELEPHONE ENCOUNTER
[facility] Refill Guideline reviewed.  Medication meets criteria for refill.   [facility]ri[facility] Pregnancy And Lactation Text: The risk during pregnancy and breastfeeding is uncertain with this medication.

## 2023-01-06 ENCOUNTER — OFFICE VISIT (OUTPATIENT)
Dept: URGENT CARE | Facility: URGENT CARE | Age: 63
End: 2023-01-06
Payer: COMMERCIAL

## 2023-01-06 VITALS
TEMPERATURE: 97.9 F | DIASTOLIC BLOOD PRESSURE: 79 MMHG | OXYGEN SATURATION: 97 % | HEART RATE: 63 BPM | SYSTOLIC BLOOD PRESSURE: 136 MMHG

## 2023-01-06 DIAGNOSIS — J45.21 MILD INTERMITTENT ASTHMA WITH ACUTE EXACERBATION: Primary | ICD-10-CM

## 2023-01-06 DIAGNOSIS — J06.9 VIRAL URI WITH COUGH: ICD-10-CM

## 2023-01-06 DIAGNOSIS — K21.00 GASTROESOPHAGEAL REFLUX DISEASE WITH ESOPHAGITIS WITHOUT HEMORRHAGE: ICD-10-CM

## 2023-01-06 PROCEDURE — 99214 OFFICE O/P EST MOD 30 MIN: CPT | Performed by: PHYSICIAN ASSISTANT

## 2023-01-06 RX ORDER — FLUTICASONE PROPIONATE 50 MCG
1 SPRAY, SUSPENSION (ML) NASAL DAILY
Qty: 16 G | Refills: 0 | Status: SHIPPED | OUTPATIENT
Start: 2023-01-06 | End: 2024-08-23

## 2023-01-06 RX ORDER — PREDNISONE 20 MG/1
40 TABLET ORAL DAILY
Qty: 10 TABLET | Refills: 0 | Status: SHIPPED | OUTPATIENT
Start: 2023-01-06 | End: 2023-01-11

## 2023-01-06 ASSESSMENT — ENCOUNTER SYMPTOMS
VOMITING: 0
SORE THROAT: 0
COUGH: 1
WHEEZING: 1
NAUSEA: 0
BLOOD IN STOOL: 0
CHEST TIGHTNESS: 1
ABDOMINAL PAIN: 1
FEVER: 0

## 2023-01-06 NOTE — PROGRESS NOTES
Assessment & Plan:        ICD-10-CM    1. Mild intermittent asthma with acute exacerbation  J45.21 fluticasone (FLONASE) 50 MCG/ACT nasal spray     beclomethasone HFA (QVAR REDIHALER) 40 MCG/ACT inhaler     predniSONE (DELTASONE) 20 MG tablet      2. Gastroesophageal reflux disease with esophagitis without hemorrhage  K21.00 omeprazole (PRILOSEC) 20 MG DR capsule      3. Viral URI with cough  J06.9             Plan/Clinical Decision Makin) Viral URI/asthma exacerbation    Patient with 5 days of viral illness with flare up of asthma.   Had lingering cough and congestion from previous illness and now worsening symptoms.   Afebrile. Mild rhonchi.   Will treat flare up with course of prednisone. With recent hx of flare up with lingering symptoms will start patient on Flonase to use daily and steroid inhaler. Use on regular basis.   Reviewed medication side effects.     2) GERD  Has been having flare up of GERD. Discussed can worsen asthma symptoms.   Start on Prilosec, if improving after 2 weeks can taper off, Can use Pepcid if needed.     Has Follow-up with PCP next week.       Return if symptoms worsen or fail to improve, for in 5-7 days.     At the end of the encounter, I discussed results, diagnosis, medications. Discussed red flags for immediate return to clinic/ER, as well as indications for follow up if no improvement. Patient understood and agreed to plan. Patient was stable for discharge.        Edwige Pina PA-C on 2023 at 1:50 PM          Subjective:     HPI:    Felisha is a 62 year old female who presents to clinic today for the following health issues:  Chief Complaint   Patient presents with     Urgent Care     Sinuses,cough/congestion, and wheezing which started October, but the last five days the cough/chest congestion was worse. Pt has asthma history.     HPI    Patient complains of sinus congestion, congestion in lungs. Symptoms for the past 5 days, hard to sleep, persistent cough. Last  few days chest heavy. Patient has hx of asthma.   Used albuterol inhaler, helped and then worsened again.     Patient had sinusitis/bronchitis in October and treated with doxycycline, prednisone infection cleared up, but had lingering congestion and cough. Negative covid at that time. Had negative CXR.     History obtained from the patient.    Review of Systems   Constitutional: Negative for fever.   HENT: Positive for congestion. Negative for sore throat.    Respiratory: Positive for cough, chest tightness and wheezing.    Gastrointestinal: Positive for abdominal pain (GERD). Negative for blood in stool, nausea and vomiting.         Patient Active Problem List   Diagnosis     Allergic state     Dysthymic disorder     Mild intermittent asthma     Hypertension goal BP (blood pressure) < 140/90     Obstructive sleep apnea     Anxiety state     IMPAIRED FASTING GLUCOSE     Major depression in complete remission (H)     CARDIOVASCULAR SCREENING; LDL GOAL LESS THAN 160     Family history of ovarian cancer     Knee pain     Osteoarthritis, knee     Abnormal finding on MRI of brain     Radicular low back pain     Recurrent major depressive disorder, in partial remission (H)     Non morbid obesity, unspecified obesity type        Past Medical History:   Diagnosis Date     Allergy, unspecified not elsewhere classified      Dysthymic disorder      Essential hypertension, benign      flight anxiety      Frequent BRONCHITIS     usually in winter     HSV (herpes simplex virus) anogenital infection      Impaired fasting glucose 9/23/2007    Glucose 101 9/07     Left anterior hemiblock      Mild intermittent asthma     URI induced     Obstructive sleep apnea (adult) (pediatric)     not on CPAP; feels rested 2014     Other and unspecified hyperlipidemia      Palpitations     PVC and PVC bigmeny     Rheumatic fever without mention of heart involvement     no sequelae     transient neurologic sx 2005    ? TIA; had MRI with temporal  lobe lesion that is getting smaller       Social History     Tobacco Use     Smoking status: Former     Packs/day: 2.00     Years: 10.00     Pack years: 20.00     Types: Cigarettes     Quit date: 1986     Years since quittin.0     Smokeless tobacco: Never   Substance Use Topics     Alcohol use: Not Currently     Alcohol/week: 0.0 - 0.8 standard drinks             Objective:     Vitals:    23 1325   BP: 136/79   BP Location: Right arm   Patient Position: Sitting   Cuff Size: Adult Regular   Pulse: 63   Temp: 97.9  F (36.6  C)   TempSrc: Tympanic   SpO2: 97%         Physical Exam   EXAM:   Pleasant, alert, appropriate appearance. NAD.  Head Exam: Normocephalic, atraumatic.  Eye Exam:   non icteric/injection.    Ear Exam: TMs grey without bulging. Normal canals.  Normal pinna.  Nose Exam: Normal external nose.    OroPharynx Exam:  Moist mucous membranes. No erythema, pharynx without exudate or hypertrophy.  Neck/Thyroid Exam:  No LAD.  No nodules or enlargement.  Chest/Respiratory Exam: CTAB. Slight rhonchi, cleared with cough.   Cardiovascular Exam: RRR. No murmur or rubs.      Results:  No results found for any visits on 23.

## 2023-02-01 ENCOUNTER — LAB (OUTPATIENT)
Dept: LAB | Facility: CLINIC | Age: 63
End: 2023-02-01
Payer: COMMERCIAL

## 2023-02-01 DIAGNOSIS — I10 BENIGN ESSENTIAL HYPERTENSION: ICD-10-CM

## 2023-02-01 DIAGNOSIS — E78.5 HYPERLIPIDEMIA LDL GOAL <100: ICD-10-CM

## 2023-02-01 LAB
ALT SERPL W P-5'-P-CCNC: 27 U/L (ref 10–35)
CHOLEST SERPL-MCNC: 132 MG/DL
HDLC SERPL-MCNC: 42 MG/DL
LDLC SERPL CALC-MCNC: 56 MG/DL
NONHDLC SERPL-MCNC: 90 MG/DL
TRIGL SERPL-MCNC: 169 MG/DL

## 2023-02-01 PROCEDURE — 80061 LIPID PANEL: CPT | Performed by: INTERNAL MEDICINE

## 2023-02-01 PROCEDURE — 36415 COLL VENOUS BLD VENIPUNCTURE: CPT | Performed by: INTERNAL MEDICINE

## 2023-02-01 PROCEDURE — 84460 ALANINE AMINO (ALT) (SGPT): CPT | Performed by: INTERNAL MEDICINE

## 2023-02-02 ENCOUNTER — OFFICE VISIT (OUTPATIENT)
Dept: CARDIOLOGY | Facility: CLINIC | Age: 63
End: 2023-02-02
Attending: INTERNAL MEDICINE
Payer: COMMERCIAL

## 2023-02-02 VITALS
SYSTOLIC BLOOD PRESSURE: 138 MMHG | HEART RATE: 65 BPM | DIASTOLIC BLOOD PRESSURE: 68 MMHG | OXYGEN SATURATION: 98 % | WEIGHT: 206.3 LBS | HEIGHT: 69 IN | BODY MASS INDEX: 30.56 KG/M2

## 2023-02-02 DIAGNOSIS — I10 BENIGN ESSENTIAL HYPERTENSION: ICD-10-CM

## 2023-02-02 DIAGNOSIS — E78.5 HYPERLIPIDEMIA LDL GOAL <100: ICD-10-CM

## 2023-02-02 DIAGNOSIS — R00.2 PALPITATIONS: Primary | ICD-10-CM

## 2023-02-02 PROCEDURE — 99214 OFFICE O/P EST MOD 30 MIN: CPT | Performed by: NURSE PRACTITIONER

## 2023-02-02 NOTE — PROGRESS NOTES
Cardiology Clinic Progress Note  Felisha Gorman MRN# [id number]   YOB: 1960 Age: 63 year old   Primary Cardiologist: Dr. Griffith Reason for visit: 6 week follow up            Assessment and Plan:     1.  Hypertension, controlled  -Blood pressure has significantly improved with the addition of nebivolol  -Discussed patient should be monitoring her blood pressure at home, as well as at her primary care provider visits and notify us if greater than 140/90    2. Hyperlipidemia, goal LDL <100  -Lipid panel has significantly improved with addition of rosuvastatin 10 mg daily LDL now 56, total cholesterol 132, triglycerides 169  -Discussed importance of continuing her dietary and lifestyle modifications at improving her HDL and reducing her triglycerides    3. Palpitations  -Palpitations improved, not completely resolved with addition of nebivolol    With further improvement in her 10-year ASCVD risk score secondary to significant improvement in her LDL, will not order stress test at this time, however could consider a stress test or CT calcium score at future visit for further risk stratification.    Changes today: No medication changes made today, advise she establish with a primary care provider    Follow up plan: Patient to follow-up with Dr. Griffith in 1 year with a repeat TTE and fasting lipid panel prior, or sooner if concerns arise        History of Presenting Illness:    Felisha Gorman is a very pleasant 63 year old female with a history of hypertension, anxiety, and palpitations.    Her last TTE in 9/2022 showed hyperdynamic LV function with an LVEF greater than 70, normal LV size, mild concentric LVH, no significant valvular abnormalities.  Dr. Freeman reviewed this and questioned possible hypertrophic obstructive cardiomyopathy, partial LV outflow tract obstruction, and possible JESSICA of the mitral valve cords.    Patient was seen by Dr. Griffith most recently in December 2022.  That time I  calculated her 10-year ASCVD risk score at 6.5%, improved from 15% in 2022, secondary to improvement in her hypertension after starting nebivolol.  He started Crestor 10 mg daily.  She previously did not tolerate atorvastatin.    She has had issues with abdominal discomfort which has improved significantly with starting prilosec and she questions if it has caused a recent flare in her ocular migraines, which she has a history of.  Advise she follow-up with her primary care provider.    Patient is here today for follow up of her hyperlipidemia.  She recently returned from a trip to [place] where she was walking frequently and focused on healthy dietary choices.  She will occasionally get lightheaded with position changes which has improved significantly since start of nebivolol.  She has a tender spot on her sternal area, chest discomfort can be replicated with pressing.  She has had a cold for the last several weeks and has been coughing frequently, now on prednisone.    She denies any myalgias with adding rosuvastatin. Patient denies chest pain or chest tightness. Denies dizziness or presyncopal symptoms. Denies tachycardia or palpitations.  Denies shortness of breath, orthopnea, or PND.    Labs from 2023 showed total cholesterol 132, HDL 42, LDL 56, triglycerides 69, ALT of 27.     Blood pressure 138/68 and HR 65 in clinic today.        Recent Hospitalizations   None in [id number]          Social History      Social History     Socioeconomic History     Marital status:      Spouse name: Dorian     Number of children: 1     Years of education: Not on file     Highest education level: Not on file   Occupational History     Occupation:       Employer: [employer]   Tobacco Use     Smoking status: Former     Packs/day: 2.00     Years: 10.00     Pack years: 20.00     Types: Cigarettes     Quit date: 1986     Years since quittin.1     Smokeless tobacco: Never  "  Substance and Sexual Activity     Alcohol use: Not Currently     Alcohol/week: 0.0 - 0.8 standard drinks     Drug use: No     Sexual activity: Yes     Partners: Male     Birth control/protection: Condom   Other Topics Concern      Service Not Asked     Blood Transfusions Not Asked     Caffeine Concern Not Asked     Occupational Exposure Not Asked     Hobby Hazards Not Asked     Sleep Concern Not Asked     Stress Concern Not Asked     Weight Concern Not Asked     Special Diet No     Comment: stress eating recently     Back Care Not Asked     Exercise No     Bike Helmet Not Asked     Seat Belt Not Asked     Self-Exams Not Asked     Parent/sibling w/ CABG, MI or angioplasty before 65F 55M? Yes   Social History Narrative     Not on file     Social Determinants of Health     Financial Resource Strain: Not on file   Food Insecurity: Not on file   Transportation Needs: Not on file   Physical Activity: Not on file   Stress: Not on file   Social Connections: Not on file   Intimate Partner Violence: Not on file   Housing Stability: Not on file            Review of Systems:   Skin:  not assessed     Eyes:  not assessed    ENT:  not assessed    Respiratory:  Positive for cough;wheezing;sleep apnea  Cardiovascular:    Positive for;palpitations;lightheadedness  Gastroenterology: not assessed    Genitourinary:  not assessed    Musculoskeletal:  not assessed    Neurologic:  not assessed    Psychiatric:  not assessed    Heme/Lymph/Imm:  not assessed    Endocrine:  not assessed           Physical Exam:   Vitals: /68 (BP Location: Right arm, Patient Position: Sitting, Cuff Size: Adult Large)   Pulse 65   Ht 1.753 m (5' 9\")   Wt 93.6 kg (206 lb 4.8 oz)   LMP 09/29/2006   SpO2 98%   BMI 30.47 kg/m     Wt Readings from Last 4 Encounters:   02/02/23 93.6 kg (206 lb 4.8 oz)   12/19/22 93.5 kg (206 lb 3.2 oz)   12/07/22 92.1 kg (203 lb)   08/10/22 93.3 kg (205 lb 9.6 oz)     GEN: well nourished, in no acute " distress.  HEENT:  Pupils equal, round. Sclerae nonicteric.   NECK: Supple, no masses appreciated.  No JVD with patient supine.  C/V:  Regular rate and rhythm, no murmur, rub or gallop.    RESP: Respirations are unlabored. Clear to auscultation bilaterally without wheezing, rales, or rhonchi.  GI: Abdomen soft, nontender.  EXTREM: No LE edema.  NEURO: Alert and oriented, cooperative.  SKIN: Warm and dry.        Data:       LIPID RESULTS:  Lab Results   Component Value Date    CHOL 132 02/01/2023    CHOL 220 (H) 04/30/2021    HDL 42 (L) 02/01/2023    HDL 45 (L) 04/30/2021    LDL 56 02/01/2023     (H) 04/30/2021    TRIG 169 (H) 02/01/2023    TRIG 152 (H) 04/30/2021    CHOLHDLRATIO 5.5 (H) 03/25/2014     LIVER ENZYME RESULTS:  Lab Results   Component Value Date    AST 15 01/13/2020    ALT 27 02/01/2023    ALT 9 01/28/2020     CBC RESULTS:  Lab Results   Component Value Date    WBC 11.7 (H) 07/23/2022    WBC 12.1 (H) 01/01/2021    RBC 4.88 07/23/2022    RBC 4.86 01/01/2021    HGB 13.9 07/23/2022    HGB 14.7 01/01/2021    HCT 44.1 07/23/2022    HCT 45.5 01/01/2021    MCV 90 07/23/2022    MCV 94 01/01/2021    MCH 28.5 07/23/2022    MCH 30.2 01/01/2021    MCHC 31.5 07/23/2022    MCHC 32.3 01/01/2021    RDW 13.8 07/23/2022    RDW 12.9 01/01/2021     07/23/2022     01/01/2021     BMP RESULTS:  Lab Results   Component Value Date     12/14/2022     01/01/2021    POTASSIUM 3.6 12/14/2022    POTASSIUM 3.5 07/23/2022    POTASSIUM 3.6 01/01/2021    CHLORIDE 105 12/14/2022    CHLORIDE 109 07/23/2022    CHLORIDE 109 01/01/2021    CO2 27 12/14/2022    CO2 24 07/23/2022    CO2 25 01/01/2021    ANIONGAP 9 12/14/2022    ANIONGAP 5 07/23/2022    ANIONGAP 7 01/01/2021    GLC 91 12/14/2022     (H) 07/23/2022    GLC 84 04/30/2021    BUN 14.3 12/14/2022    BUN 25 07/23/2022    BUN 21 01/01/2021    CR 0.79 12/14/2022    CR 0.76 01/01/2021    GFRESTIMATED 83 12/14/2022    GFRESTIMATED 83 01/01/2021     GFRESTBLACK >90 01/01/2021    JACK 9.2 12/14/2022    JACK 8.9 01/01/2021      A1C RESULTS:  Lab Results   Component Value Date    A1C 5.2 04/30/2021     INR RESULTS:  Lab Results   Component Value Date    INR 1.09 06/16/2005            Medications     Current Outpatient Medications   Medication Sig Dispense Refill     albuterol (VENTOLIN HFA) 108 (90 Base) MCG/ACT inhaler Inhale 2 puffs into the lungs every 6 hours 18 g 3     beclomethasone HFA (QVAR REDIHALER) 40 MCG/ACT inhaler Inhale 1 puff into the lungs 2 times daily 10.6 g 0     clobetasol (TEMOVATE) 0.05 % external ointment Apply topically 2 times daily . 2 weeks on, 2 weeks off. External genitalia only. 60 g 1     fluticasone (FLONASE) 50 MCG/ACT nasal spray Spray 1 spray into both nostrils daily 16 g 0     losartan (COZAAR) 25 MG tablet Take 1 tablet (25 mg) by mouth daily 90 tablet 3     nebivolol (BYSTOLIC) 5 MG tablet Take 1 tablet (5 mg) by mouth daily 30 tablet 4     omeprazole (PRILOSEC) 20 MG DR capsule Take 1 capsule (20 mg) by mouth daily 30 capsule 0     propranolol (INDERAL) 10 MG tablet Take 1-2 tablets (10-20 mg) by mouth daily as needed (palpitations) 30 tablet 0     rosuvastatin (CRESTOR) 10 MG tablet Take 1 tablet (10 mg) by mouth daily 90 tablet 0     ALPRAZolam (XANAX) 0.5 MG tablet Take 1 tablet (0.5 mg) by mouth 3 times daily as needed for anxiety (Patient not taking: Reported on 2/2/2023) 20 tablet 0          Past Medical History     Past Medical History:   Diagnosis Date     Allergy, unspecified not elsewhere classified      Dysthymic disorder      Essential hypertension, benign      flight anxiety      Frequent BRONCHITIS     usually in winter     HSV (herpes simplex virus) anogenital infection      Impaired fasting glucose 9/23/2007    Glucose 101 9/07     Left anterior hemiblock      Mild intermittent asthma     URI induced     Obstructive sleep apnea (adult) (pediatric)     not on CPAP; feels rested 2014     Other and unspecified  hyperlipidemia      Palpitations     PVC and PVC bigmeny     Rheumatic fever without mention of heart involvement     no sequelae     transient neurologic sx 2005    ? TIA; had MRI with temporal lobe lesion that is getting smaller     Past Surgical History:   Procedure Laterality Date     COLONOSCOPY  07/2009    hyperplastic polyp; repeat 10 years     COLONOSCOPY  12/2020    repeat 5 years; tubular adenoma     SURGICAL HISTORY OF -   01/2006    endometrial ablation     SURGICAL HISTORY OF -   12/18/17    bilateral breast reduction and tummy tuck     TONSILLECTOMY       Z NONSPECIFIC PROCEDURE  01/2002    nasal polyp removed     Family History   Problem Relation Age of Onset     C.A.D. Brother         age 50     Heart Disease Brother      C.A.D. Mother      Heart Disease Mother      Cancer Mother         ovarian OK now     Cancer - colorectal Mother         cancerous polyp     Dementia Mother      C.A.D. Father         MI     Heart Disease Father             Allergies   Atorvastatin and Penicillin [penicillins]        Cassidy Cortez NP  [facility]  Pager: [phone]

## 2023-02-02 NOTE — LETTER
2/2/2023    [facility] [place]  [address]  [place] [address]    RE: Felisha RICHARDS Sherif       Dear Colleague,     I had the pleasure of seeing Felisha Gorman in the [facility].  Cardiology Clinic Progress Note  Felisha Gorman MRN# [id number]   YOB: 1960 Age: 63 year old   Primary Cardiologist: Dr. Griffith Reason for visit: 6 week follow up            Assessment and Plan:     1.  Hypertension, controlled  -Blood pressure has significantly improved with the addition of nebivolol  -Discussed patient should be monitoring her blood pressure at home, as well as at her primary care provider visits and notify us if greater than 140/90    2. Hyperlipidemia, goal LDL <100  -Lipid panel has significantly improved with addition of rosuvastatin 10 mg daily LDL now 56, total cholesterol 132, triglycerides 169  -Discussed importance of continuing her dietary and lifestyle modifications at improving her HDL and reducing her triglycerides    3. Palpitations  -Palpitations improved, not completely resolved with addition of nebivolol    With further improvement in her 10-year ASCVD risk score secondary to significant improvement in her LDL, will not order stress test at this time, however could consider a stress test or CT calcium score at future visit for further risk stratification.    Changes today: No medication changes made today, advise she establish with a primary care provider    Follow up plan: Patient to follow-up with Dr. Griffith in 1 year with a repeat TTE and fasting lipid panel prior, or sooner if concerns arise        History of Presenting Illness:    Felisha Gorman is a very pleasant 63 year old female with a history of hypertension, anxiety, and palpitations.    Her last TTE in 9/2022 showed hyperdynamic LV function with an LVEF greater than 70, normal LV size, mild concentric LVH, no significant valvular abnormalities.  Dr. Freeman reviewed this and  questioned possible hypertrophic obstructive cardiomyopathy, partial LV outflow tract obstruction, and possible JESSICA of the mitral valve cords.    Patient was seen by Dr. Griffith most recently in December 2022.  That time I calculated her 10-year ASCVD risk score at 6.5%, improved from 15% in October 2022, secondary to improvement in her hypertension after starting nebivolol.  He started Crestor 10 mg daily.  She previously did not tolerate atorvastatin.    She has had issues with abdominal discomfort which has improved significantly with starting prilosec and she questions if it has caused a recent flare in her ocular migraines, which she has a history of.  Advise she follow-up with her primary care provider.    Patient is here today for follow up of her hyperlipidemia.  She recently returned from a trip to [place] where she was walking frequently and focused on healthy dietary choices.  She will occasionally get lightheaded with position changes which has improved significantly since start of nebivolol.  She has a tender spot on her sternal area, chest discomfort can be replicated with pressing.  She has had a cold for the last several weeks and has been coughing frequently, now on prednisone.    She denies any myalgias with adding rosuvastatin. Patient denies chest pain or chest tightness. Denies dizziness or presyncopal symptoms. Denies tachycardia or palpitations.  Denies shortness of breath, orthopnea, or PND.    Labs from 2/1/2023 showed total cholesterol 132, HDL 42, LDL 56, triglycerides 69, ALT of 27.     Blood pressure 138/68 and HR 65 in clinic today.        Recent Hospitalizations   None in [id number]          Social History      Social History     Socioeconomic History     Marital status:      Spouse name: Dorian     Number of children: 1     Years of education: Not on file     Highest education level: Not on file   Occupational History     Occupation:       Employer: [employer]   Tobacco Use     Smoking status: Former     Packs/day: 2.00     Years: 10.00     Pack years: 20.00     Types: Cigarettes     Quit date: 1986     Years since quittin.1     Smokeless tobacco: Never   Substance and Sexual Activity     Alcohol use: Not Currently     Alcohol/week: 0.0 - 0.8 standard drinks     Drug use: No     Sexual activity: Yes     Partners: Male     Birth control/protection: Condom   Other Topics Concern      Service Not Asked     Blood Transfusions Not Asked     Caffeine Concern Not Asked     Occupational Exposure Not Asked     Hobby Hazards Not Asked     Sleep Concern Not Asked     Stress Concern Not Asked     Weight Concern Not Asked     Special Diet No     Comment: stress eating recently     Back Care Not Asked     Exercise No     Bike Helmet Not Asked     Seat Belt Not Asked     Self-Exams Not Asked     Parent/sibling w/ CABG, MI or angioplasty before 65F 55M? Yes   Social History Narrative     Not on file     Social Determinants of Health     Financial Resource Strain: Not on file   Food Insecurity: Not on file   Transportation Needs: Not on file   Physical Activity: Not on file   Stress: Not on file   Social Connections: Not on file   Intimate Partner Violence: Not on file   Housing Stability: Not on file            Review of Systems:   Skin:  not assessed     Eyes:  not assessed    ENT:  not assessed    Respiratory:  Positive for cough;wheezing;sleep apnea  Cardiovascular:    Positive for;palpitations;lightheadedness  Gastroenterology: not assessed    Genitourinary:  not assessed    Musculoskeletal:  not assessed    Neurologic:  not assessed    Psychiatric:  not assessed    Heme/Lymph/Imm:  not assessed    Endocrine:  not assessed           Physical Exam:   Vitals: /68 (BP Location: Right arm, Patient Position: Sitting, Cuff Size: Adult Large)   Pulse 65   Ht 1.753 m (5' 9\")   Wt 93.6 kg (206 lb 4.8 oz)   LMP 2006   SpO2 98%   BMI 30.47 " kg/m     Wt Readings from Last 4 Encounters:   02/02/23 93.6 kg (206 lb 4.8 oz)   12/19/22 93.5 kg (206 lb 3.2 oz)   12/07/22 92.1 kg (203 lb)   08/10/22 93.3 kg (205 lb 9.6 oz)     GEN: well nourished, in no acute distress.  HEENT:  Pupils equal, round. Sclerae nonicteric.   NECK: Supple, no masses appreciated.  No JVD with patient supine.  C/V:  Regular rate and rhythm, no murmur, rub or gallop.    RESP: Respirations are unlabored. Clear to auscultation bilaterally without wheezing, rales, or rhonchi.  GI: Abdomen soft, nontender.  EXTREM: No LE edema.  NEURO: Alert and oriented, cooperative.  SKIN: Warm and dry.        Data:       LIPID RESULTS:  Lab Results   Component Value Date    CHOL 132 02/01/2023    CHOL 220 (H) 04/30/2021    HDL 42 (L) 02/01/2023    HDL 45 (L) 04/30/2021    LDL 56 02/01/2023     (H) 04/30/2021    TRIG 169 (H) 02/01/2023    TRIG 152 (H) 04/30/2021    CHOLHDLRATIO 5.5 (H) 03/25/2014     LIVER ENZYME RESULTS:  Lab Results   Component Value Date    AST 15 01/13/2020    ALT 27 02/01/2023    ALT 9 01/28/2020     CBC RESULTS:  Lab Results   Component Value Date    WBC 11.7 (H) 07/23/2022    WBC 12.1 (H) 01/01/2021    RBC 4.88 07/23/2022    RBC 4.86 01/01/2021    HGB 13.9 07/23/2022    HGB 14.7 01/01/2021    HCT 44.1 07/23/2022    HCT 45.5 01/01/2021    MCV 90 07/23/2022    MCV 94 01/01/2021    MCH 28.5 07/23/2022    MCH 30.2 01/01/2021    MCHC 31.5 07/23/2022    MCHC 32.3 01/01/2021    RDW 13.8 07/23/2022    RDW 12.9 01/01/2021     07/23/2022     01/01/2021     BMP RESULTS:  Lab Results   Component Value Date     12/14/2022     01/01/2021    POTASSIUM 3.6 12/14/2022    POTASSIUM 3.5 07/23/2022    POTASSIUM 3.6 01/01/2021    CHLORIDE 105 12/14/2022    CHLORIDE 109 07/23/2022    CHLORIDE 109 01/01/2021    CO2 27 12/14/2022    CO2 24 07/23/2022    CO2 25 01/01/2021    ANIONGAP 9 12/14/2022    ANIONGAP 5 07/23/2022    ANIONGAP 7 01/01/2021    GLC 91 12/14/2022    GLC  130 (H) 07/23/2022    GLC 84 04/30/2021    BUN 14.3 12/14/2022    BUN 25 07/23/2022    BUN 21 01/01/2021    CR 0.79 12/14/2022    CR 0.76 01/01/2021    GFRESTIMATED 83 12/14/2022    GFRESTIMATED 83 01/01/2021    GFRESTBLACK >90 01/01/2021    JACK 9.2 12/14/2022    JACK 8.9 01/01/2021      A1C RESULTS:  Lab Results   Component Value Date    A1C 5.2 04/30/2021     INR RESULTS:  Lab Results   Component Value Date    INR 1.09 06/16/2005            Medications     Current Outpatient Medications   Medication Sig Dispense Refill     albuterol (VENTOLIN HFA) 108 (90 Base) MCG/ACT inhaler Inhale 2 puffs into the lungs every 6 hours 18 g 3     beclomethasone HFA (QVAR REDIHALER) 40 MCG/ACT inhaler Inhale 1 puff into the lungs 2 times daily 10.6 g 0     clobetasol (TEMOVATE) 0.05 % external ointment Apply topically 2 times daily . 2 weeks on, 2 weeks off. External genitalia only. 60 g 1     fluticasone (FLONASE) 50 MCG/ACT nasal spray Spray 1 spray into both nostrils daily 16 g 0     losartan (COZAAR) 25 MG tablet Take 1 tablet (25 mg) by mouth daily 90 tablet 3     nebivolol (BYSTOLIC) 5 MG tablet Take 1 tablet (5 mg) by mouth daily 30 tablet 4     omeprazole (PRILOSEC) 20 MG DR capsule Take 1 capsule (20 mg) by mouth daily 30 capsule 0     propranolol (INDERAL) 10 MG tablet Take 1-2 tablets (10-20 mg) by mouth daily as needed (palpitations) 30 tablet 0     rosuvastatin (CRESTOR) 10 MG tablet Take 1 tablet (10 mg) by mouth daily 90 tablet 0     ALPRAZolam (XANAX) 0.5 MG tablet Take 1 tablet (0.5 mg) by mouth 3 times daily as needed for anxiety (Patient not taking: Reported on 2/2/2023) 20 tablet 0          Past Medical History     Past Medical History:   Diagnosis Date     Allergy, unspecified not elsewhere classified      Dysthymic disorder      Essential hypertension, benign      flight anxiety      Frequent BRONCHITIS     usually in winter     HSV (herpes simplex virus) anogenital infection      Impaired fasting glucose  9/23/2007    Glucose 101 9/07     Left anterior hemiblock      Mild intermittent asthma     URI induced     Obstructive sleep apnea (adult) (pediatric)     not on CPAP; feels rested 2014     Other and unspecified hyperlipidemia      Palpitations     PVC and PVC bigmeny     Rheumatic fever without mention of heart involvement     no sequelae     transient neurologic sx 2005    ? TIA; had MRI with temporal lobe lesion that is getting smaller     Past Surgical History:   Procedure Laterality Date     COLONOSCOPY  07/2009    hyperplastic polyp; repeat 10 years     COLONOSCOPY  12/2020    repeat 5 years; tubular adenoma     SURGICAL HISTORY OF -   01/2006    endometrial ablation     SURGICAL HISTORY OF -   12/18/17    bilateral breast reduction and tummy tuck     TONSILLECTOMY       Z NONSPECIFIC PROCEDURE  01/2002    nasal polyp removed     Family History   Problem Relation Age of Onset     C.A.D. Brother         age 50     Heart Disease Brother      C.A.D. Mother      Heart Disease Mother      Cancer Mother         ovarian OK now     Cancer - colorectal Mother         cancerous polyp     Dementia Mother      C.A.D. Father         MI     Heart Disease Father             Allergies   Atorvastatin and Penicillin [penicillins]        Cassidy Cortez NP  [facility]  Pager: [phone]    Thank you for allowing me to participate in the care of your patient.      Sincerely,     Cassidy Cortez NP     [facility]  cc:   Daniel Griffith MD  [address] [place] [id number]

## 2023-02-28 ENCOUNTER — OFFICE VISIT (OUTPATIENT)
Dept: FAMILY MEDICINE | Facility: CLINIC | Age: 63
End: 2023-02-28
Payer: COMMERCIAL

## 2023-02-28 VITALS
TEMPERATURE: 98 F | HEIGHT: 69 IN | SYSTOLIC BLOOD PRESSURE: 108 MMHG | HEART RATE: 68 BPM | DIASTOLIC BLOOD PRESSURE: 82 MMHG | WEIGHT: 209 LBS | RESPIRATION RATE: 12 BRPM | OXYGEN SATURATION: 98 % | BODY MASS INDEX: 30.96 KG/M2

## 2023-02-28 DIAGNOSIS — K21.00 GASTROESOPHAGEAL REFLUX DISEASE WITH ESOPHAGITIS WITHOUT HEMORRHAGE: ICD-10-CM

## 2023-02-28 DIAGNOSIS — R10.13 DYSPEPSIA: Primary | ICD-10-CM

## 2023-02-28 DIAGNOSIS — R06.2 WHEEZING: ICD-10-CM

## 2023-02-28 LAB
ALBUMIN SERPL BCG-MCNC: 4.4 G/DL (ref 3.5–5.2)
ALP SERPL-CCNC: 76 U/L (ref 35–104)
ALT SERPL W P-5'-P-CCNC: 30 U/L (ref 10–35)
ANION GAP SERPL CALCULATED.3IONS-SCNC: 12 MMOL/L (ref 7–15)
AST SERPL W P-5'-P-CCNC: 23 U/L (ref 10–35)
BILIRUB SERPL-MCNC: 0.5 MG/DL
BUN SERPL-MCNC: 20.8 MG/DL (ref 8–23)
CALCIUM SERPL-MCNC: 9.7 MG/DL (ref 8.8–10.2)
CHLORIDE SERPL-SCNC: 104 MMOL/L (ref 98–107)
CREAT SERPL-MCNC: 0.86 MG/DL (ref 0.51–0.95)
CRP SERPL-MCNC: <3 MG/L
DEPRECATED HCO3 PLAS-SCNC: 25 MMOL/L (ref 22–29)
ERYTHROCYTE [DISTWIDTH] IN BLOOD BY AUTOMATED COUNT: 13.5 % (ref 10–15)
GFR SERPL CREATININE-BSD FRML MDRD: 75 ML/MIN/1.73M2
GLUCOSE SERPL-MCNC: 97 MG/DL (ref 70–99)
HCT VFR BLD AUTO: 44.2 % (ref 35–47)
HGB BLD-MCNC: 14.9 G/DL (ref 11.7–15.7)
MCH RBC QN AUTO: 29.6 PG (ref 26.5–33)
MCHC RBC AUTO-ENTMCNC: 33.7 G/DL (ref 31.5–36.5)
MCV RBC AUTO: 88 FL (ref 78–100)
PLATELET # BLD AUTO: 348 10E3/UL (ref 150–450)
POTASSIUM SERPL-SCNC: 3.6 MMOL/L (ref 3.4–5.3)
PROT SERPL-MCNC: 7.2 G/DL (ref 6.4–8.3)
RBC # BLD AUTO: 5.03 10E6/UL (ref 3.8–5.2)
SODIUM SERPL-SCNC: 141 MMOL/L (ref 136–145)
WBC # BLD AUTO: 8.5 10E3/UL (ref 4–11)

## 2023-02-28 PROCEDURE — 99214 OFFICE O/P EST MOD 30 MIN: CPT | Performed by: FAMILY MEDICINE

## 2023-02-28 PROCEDURE — 36415 COLL VENOUS BLD VENIPUNCTURE: CPT | Performed by: FAMILY MEDICINE

## 2023-02-28 PROCEDURE — 85027 COMPLETE CBC AUTOMATED: CPT | Performed by: FAMILY MEDICINE

## 2023-02-28 PROCEDURE — 80053 COMPREHEN METABOLIC PANEL: CPT | Performed by: FAMILY MEDICINE

## 2023-02-28 PROCEDURE — 86140 C-REACTIVE PROTEIN: CPT | Performed by: FAMILY MEDICINE

## 2023-02-28 RX ORDER — FLUTICASONE PROPIONATE 220 UG/1
1 AEROSOL, METERED RESPIRATORY (INHALATION) 2 TIMES DAILY
Qty: 12 G | Refills: 3 | Status: SHIPPED | OUTPATIENT
Start: 2023-02-28 | End: 2023-12-03

## 2023-02-28 ASSESSMENT — ASTHMA QUESTIONNAIRES
ACT_TOTALSCORE: 16
QUESTION_5 LAST FOUR WEEKS HOW WOULD YOU RATE YOUR ASTHMA CONTROL: SOMEWHAT CONTROLLED
QUESTION_4 LAST FOUR WEEKS HOW OFTEN HAVE YOU USED YOUR RESCUE INHALER OR NEBULIZER MEDICATION (SUCH AS ALBUTEROL): TWO OR THREE TIMES PER WEEK
QUESTION_3 LAST FOUR WEEKS HOW OFTEN DID YOUR ASTHMA SYMPTOMS (WHEEZING, COUGHING, SHORTNESS OF BREATH, CHEST TIGHTNESS OR PAIN) WAKE YOU UP AT NIGHT OR EARLIER THAN USUAL IN THE MORNING: ONCE A WEEK
QUESTION_2 LAST FOUR WEEKS HOW OFTEN HAVE YOU HAD SHORTNESS OF BREATH: ONCE OR TWICE A WEEK
QUESTION_1 LAST FOUR WEEKS HOW MUCH OF THE TIME DID YOUR ASTHMA KEEP YOU FROM GETTING AS MUCH DONE AT WORK, SCHOOL OR AT HOME: SOME OF THE TIME
ACT_TOTALSCORE: 16

## 2023-02-28 ASSESSMENT — PATIENT HEALTH QUESTIONNAIRE - PHQ9
SUM OF ALL RESPONSES TO PHQ QUESTIONS 1-9: 4
10. IF YOU CHECKED OFF ANY PROBLEMS, HOW DIFFICULT HAVE THESE PROBLEMS MADE IT FOR YOU TO DO YOUR WORK, TAKE CARE OF THINGS AT HOME, OR GET ALONG WITH OTHER PEOPLE: NOT DIFFICULT AT ALL
SUM OF ALL RESPONSES TO PHQ QUESTIONS 1-9: 4

## 2023-02-28 ASSESSMENT — ENCOUNTER SYMPTOMS
ABDOMINAL PAIN: 1
COUGH: 1

## 2023-02-28 NOTE — PROGRESS NOTES
Assessment & Plan     Dyspepsia  Given chronicity, worsening symptoms recommend EGD with Bravo study.  Check for H. pylori, patient will hold PPI therapy 1 week prior to getting endoscopy CBC unremarkable showed resolving leukocytosis.  Metabolic panel and CRP pending.  We will contact patient with results if abnormal for further management prior to follow-up visit.  - Adult GI  Referral - Procedure Only  - Helicobacter pylori Antigen Stool  - CBC with platelets  - Comprehensive metabolic panel (BMP + Alb, Alk Phos, ALT, AST, Total. Bili, TP)  - CRP, inflammation  - Helicobacter pylori Antigen Stool  - CBC with platelets  - Comprehensive metabolic panel (BMP + Alb, Alk Phos, ALT, AST, Total. Bili, TP)  - CRP, inflammation    Gastroesophageal reflux disease with esophagitis without hemorrhage  Continue current medical management  - omeprazole (PRILOSEC) 20 MG DR capsule  Dispense: 30 capsule; Refill: 0    Wheezing  Distant tobacco smoker for 6 years in her early 20s, never had pulmonary function testing.  Patient has had scattered care in the healthcare system ranging from multiple different providers including going to urgent care, reinforced recommendation for continuity care with a single clinic and provider for best medical management.  At this time, recommend pulmonary function test for diagnostic clarification, start Flovent for controller and continue albuterol.  - General PFT Lab (Please always keep checked)  - fluticasone (FLOVENT HFA) 220 MCG/ACT inhaler  Dispense: 12 g; Refill: 3        Return in about 1 month (around 3/28/2023) for abdominal discomfort .    Rishi Donovan MD  [facility] ANAND Beaza is a 63 year old, presenting for the following health issues:    Cough (For 4 months) and Bloated (Getting full very quickly. Nausea. )      History of Present Illness       Reason for visit:  Digestive issues and lingering cough    She eats 2-3 servings of fruits and  "vegetables daily.She consumes 0 sweetened beverage(s) daily.She exercises with enough effort to increase her heart rate 9 or less minutes per day.  She exercises with enough effort to increase her heart rate 3 or less days per week.   She is taking medications regularly.    Today's PHQ-9         PHQ-9 Total Score: 4    PHQ-9 Q9 Thoughts of better off dead/self-harm past 2 weeks :   Not at all    How difficult have these problems made it for you to do your work, take care of things at home, or get along with other people: Not difficult at all     Patient is 63-year-old female who presents with concerns of cough and feeling bloated.    Feels pressure over the top of her abdomen and wonders if it is related to GERD, Prilosec helpful and requesting refill.  No fevers unintentional weight loss or blood in stool.  No known family history of inflammatory bowel disease or colorectal cancer.    She was also seen in urgent care for cough was prescribed bronchodilator therapy which was not covered by her insurance, was initially prescribed Qvar.  Was scheduled to have follow-up with PCP in a week and never went.    Review of Systems   Respiratory: Positive for cough.    Gastrointestinal: Positive for abdominal pain.            Objective    /82 (Cuff Size: Adult Large)   Pulse 68   Temp 98  F (36.7  C) (Oral)   Resp 12   Ht 1.753 m (5' 9\")   Wt 94.8 kg (209 lb)   LMP 09/29/2006   SpO2 98%   BMI 30.86 kg/m    Body mass index is 30.86 kg/m .  Physical Exam  Vitals reviewed.   Constitutional:       Appearance: She is not ill-appearing.   Cardiovascular:      Rate and Rhythm: Normal rate and regular rhythm.   Pulmonary:      Effort: Pulmonary effort is normal.      Breath sounds: Wheezing present.   Abdominal:      General: Abdomen is flat. There is no distension.      Palpations: There is no mass.      Tenderness: There is no abdominal tenderness.      Hernia: No hernia is present.   Neurological:      Mental Status: " She is alert.

## 2023-03-21 ENCOUNTER — TELEPHONE (OUTPATIENT)
Dept: GASTROENTEROLOGY | Facility: CLINIC | Age: 63
End: 2023-03-21
Payer: COMMERCIAL

## 2023-03-21 NOTE — TELEPHONE ENCOUNTER
Screening Questions  BLUE  KIND OF PREP RED  LOCATION [review exclusion criteria] GREEN  SEDATION TYPE        Y Are you active on mychart?       Rishi Donovan MD   Ordering/Referring Provider?        BCBS What type of coverage do you have?      N Have you had a positive covid test in the last 14 days?     29.5 1. BMI  [BMI 40+ - review exclusion criteria]    Y  2. Are you able to give consent for your medical care? [IF NO,RN REVIEW]          N  3. Are you taking any prescription pain medications on a routine schedule   (ex narcotics: oxycodone, roxicodone, oxycontin,  and percocet)? [RN Review]          3a. EXTENDED PREP What kind of prescription?     N 4. Do you have any chemical dependencies such as alcohol, street drugs, or methadone?        **If yes 3- 5 , please schedule with MAC sedation.**          IF YES TO ANY 6 - 10 - HOSPITAL SETTING ONLY.     N 6.   Do you need assistance transferring?     N 7.   Have you had a heart or lung transplant?    N 8.   Are you currently on dialysis?   N 9.   Do you use daily home oxygen?   N 10. Do you take nitroglycerin?   10a.  If yes, how often?     11. [FEMALES]  N Are you currently pregnant?    11a.  If yes, how many weeks? [ Greater than 12 weeks, OR NEEDED]    N 12. Do you have Pulmonary Hypertension? *NEED PAC APPT AT [facility] w/ MAC*     N 13. [review exclusion criteria]  Do you have any implantable devices in your body (pacemaker, defib, LVAD)?    N 14. In the past 6 months, have you had any heart related issues including cardiomyopathy or heart attack?     14a.  If yes, did it require cardiac stenting if so when?     N 15. Have you had a stroke or Transient ischemic attack (TIA - aka  mini stroke ) within 6 months?      N 16. Do you have mod to severe Obstructive Sleep Apnea?  [Hospital only]    N 17. Do you have SEVERE AND UNCONTROLLED asthma? *NEED PAC APPT AT [facility] w/MAC*     18. Are you currently taking any blood thinners?     18a. No. Continue to 19.   18b.  "Yes/no Blood Thinner: No [CONTINUE TO #19]    N 19. Do you take the medication Phentermine?    19a. If yes, \"Hold for 7 days before procedure.  Please consult your prescribing provider if you have questions about holding this medication.\"     N  20. Do you have chronic kidney disease?      N  21. Do you have a diagnosis of diabetes?     N  22. On a regular basis do you go 3-5 days between bowel movements?      23. Preferred LOCAL Pharmacy for Pre Prescription    [ LIST ONLY ONE PHARMACY]        [id number] - [place], [place] - [address]      - CLOSING REMINDERS -    Informed patient they will need an adult    Cannot take any type of public or medical transportation alone    Conscious Sedation- Needs  for 6 hours after the procedure       MAC/General-Needs  for 24 hours after procedure    Pre-Procedure Covid test to be completed [[facility] PCR Testing Required]    Confirmed Nurse will call to complete assessment       - SCHEDULING DETAILS -  NO Hospital Setting Required? If yes, what is the exclusion?:    TBD  Surgeon    TBD  Date of Procedure  Upper Endoscopy with BRAVO [EGD BRAVO]  Type of Procedure Scheduled  TBD  Location   Which Colonoscopy Prep was Sent?     MAC, PER PT REQUEST Sedation Type     N PAC / Pre-op Required         Patient prefers to go to location closer to home, EGD/BRAVO only offered at [facility]. Patient request to pause scheduling for now. Patient wants to talk with ordering provider first if okay to schedule EGD without BRAVO, patient will call back to schedule after talking with provider.        "

## 2023-03-26 ENCOUNTER — HEALTH MAINTENANCE LETTER (OUTPATIENT)
Age: 63
End: 2023-03-26

## 2023-05-19 DIAGNOSIS — I10 BENIGN ESSENTIAL HYPERTENSION: ICD-10-CM

## 2023-05-19 RX ORDER — NEBIVOLOL 5 MG/1
5 TABLET ORAL DAILY
Qty: 90 TABLET | Refills: 2 | Status: SHIPPED | OUTPATIENT
Start: 2023-05-19 | End: 2024-08-23 | Stop reason: ALTCHOICE

## 2023-05-23 ENCOUNTER — TELEPHONE (OUTPATIENT)
Dept: FAMILY MEDICINE | Facility: CLINIC | Age: 63
End: 2023-05-23
Payer: COMMERCIAL

## 2023-05-23 NOTE — TELEPHONE ENCOUNTER
Summary:    iling the following:   MAMMOGRAM and PHYSICAL    Reviewed:    [] CARE EVERYWHERE  [] LAST OV NOTE   [] FYI TAB  [] MYCHART ACTIVE?  [] LAST PANEL ENCOUNTER  [] FUTURE APPTS  [] IMMUNIZATIONS  [] Media Tab  Action needed:   Patient needs office visit for mammo,welcome to medicare.    Type of outreach:    Sent Specle message.                                                                               Gini Martin CMA

## 2023-06-01 ENCOUNTER — HEALTH MAINTENANCE LETTER (OUTPATIENT)
Age: 63
End: 2023-06-01

## 2023-06-02 ENCOUNTER — TELEPHONE (OUTPATIENT)
Dept: GASTROENTEROLOGY | Facility: CLINIC | Age: 63
End: 2023-06-02
Payer: COMMERCIAL

## 2023-06-02 ENCOUNTER — TELEPHONE (OUTPATIENT)
Dept: FAMILY MEDICINE | Facility: CLINIC | Age: 63
End: 2023-06-02
Payer: COMMERCIAL

## 2023-06-02 DIAGNOSIS — R10.13 DYSPEPSIA: Primary | ICD-10-CM

## 2023-06-02 NOTE — TELEPHONE ENCOUNTER
Screening Questions  BLUE  KIND OF PREP RED  LOCATION [review exclusion criteria] GREEN  SEDATION TYPE        Y Are you active on mychart?       ALYSE Ordering/Referring Provider?        BCBS What type of coverage do you have?      N Have you had a positive covid test in the last 14 days?     28.8 1. BMI  [BMI 40+ - review exclusion criteria& smart-phrase document]    Y  2. Are you able to give consent for your medical care? [IF NO,RN REVIEW]          N  3. Are you taking any prescription pain medications on a routine schedule   (ex narcotics: oxycodone, roxicodone, oxycontin,  and percocet)? [RN Review]          3a. EXTENDED PREP What kind of prescription?     N 4. Do you have any chemical dependencies such as alcohol, street drugs, or methadone?        **If yes 3- 5 , please schedule with MAC sedation.**          IF YES TO ANY 6 - 10 - HOSPITAL SETTING ONLY.     N 6.   Do you need assistance transferring?     N 7.   Have you had a heart or lung transplant?    N 8.   Are you currently on dialysis?   N 9.   Do you use daily home oxygen?   N 10. Do you take nitroglycerin?   10a.  If yes, how often?      11. Are you currently pregnant?    11a.  If yes, how many weeks? [ Greater than 12 weeks, OR NEEDED]    N 12. Do you have Pulmonary Hypertension? *NEED PAC APPT AT [facility] w/ MAC*     N 13. [review exclusion criteria]  Do you have any implantable devices in your body (pacemaker, defib, LVAD)?    N 14. In the past 6 months, have you had any heart related issues including cardiomyopathy or heart attack?     14a.  If yes, did it require cardiac stenting if so when?     N 15. Have you had a stroke or Transient ischemic attack (TIA - aka  mini stroke ) within 6 months?      n 16. Do you have mod to severe Obstructive Sleep Apnea?  [Hospital only]    n 17. Do you have SEVERE AND UNCONTROLLED asthma? *NEED PAC APPT AT [facility] w/MAC*     18.Do you take blood thinners?  No    N 19. Do you take any of the following  "medications?    Phentermine    Ozempic    Wegovy (Semaglutide)      19a. If yes, \"Hold for 7 days before procedure.  Please consult your prescribing provider if you have questions about holding this medication.\"     N  20. Do you have chronic kidney disease?      N  21. Do you have a diagnosis of diabetes?     NA  22. On a regular basis do you go 3-5 days between bowel movements?      23. Preferred LOCAL Pharmacy for Pre Prescription         [id number] - [place], [place] - [address]        - CLOSING REMINDERS -    You will receive a call from a Nurse to review instructions and health history.  This assessment must be completed prior to your procedure.  Failure to complete the Nurse assessment may result in the procedure being cancelled.      On the day of your procedure, please designatean adult(s) who can drive you home stay with you for the next 24 hours. The medicines used in the exam will make you sleepy. You will not be able to drive.      You cannot take public transportation, ride share services, or non-medical taxi service without a responsible caregiver.  Medical transport services are allowed with the requirement that a responsible caregiver will receive you at your destination.  We require that drivers and caregivers are confirmed prior to your procedure.      - SCHEDULING DETAILS -  N &  Hospital Setting Required & If yes, what is the exclusion?   TBD  Surgeon    TBD  Date of Procedure  Upper Endoscopy [EGD]  Type of Procedure Scheduled  [facility] Location      TBD Sedation Type     TBD PAC / Pre-op Required             PATIENT WANTED TO SEE IF SHE COULD SCHEDULE WITH [facility] SINCE ITS CLOSER TO HER  "

## 2023-06-02 NOTE — TELEPHONE ENCOUNTER
General Call    Contacts       Type Contact Phone/Fax    06/02/2023 10:32 AM CDT Phone (Incoming) Felisha Gorman (Self) [phone] (H)     Gotten a referral for upper endoscopy and would like to go to [facility] in [place]. Requesting the referral be sent there.        Reason for Call:  Requesting referral     What are your questions or concerns:  Gotten a referral for upper endoscopy and would like to go to [facility] in [place]. Requesting the referral be sent there.    Date of last appointment with provider: 2/28/23    Could we send this information to you in [employer] or would you prefer to receive a phone call?:   Patient would prefer a phone call   Okay to leave a detailed message?: Yes at Cell number on file:    Telephone Information:   Mobile [phone]

## 2023-06-08 DIAGNOSIS — K21.00 GASTROESOPHAGEAL REFLUX DISEASE WITH ESOPHAGITIS WITHOUT HEMORRHAGE: ICD-10-CM

## 2023-06-16 NOTE — TELEPHONE ENCOUNTER
Patient calls, she spoke to [facility] and they have no record of the referral. They are requesting we fax the referral to [phone].     Referral printed and faxed to number provided.     Flori Lambert RN  [facility]

## 2023-06-18 ENCOUNTER — OFFICE VISIT (OUTPATIENT)
Dept: URGENT CARE | Facility: URGENT CARE | Age: 63
End: 2023-06-18
Payer: COMMERCIAL

## 2023-06-18 VITALS
TEMPERATURE: 97.2 F | RESPIRATION RATE: 14 BRPM | HEART RATE: 64 BPM | DIASTOLIC BLOOD PRESSURE: 71 MMHG | OXYGEN SATURATION: 98 % | SYSTOLIC BLOOD PRESSURE: 156 MMHG | BODY MASS INDEX: 30.27 KG/M2 | WEIGHT: 205 LBS

## 2023-06-18 DIAGNOSIS — J45.31 MILD PERSISTENT ASTHMA WITH ACUTE EXACERBATION: ICD-10-CM

## 2023-06-18 DIAGNOSIS — J01.90 ACUTE NON-RECURRENT SINUSITIS, UNSPECIFIED LOCATION: Primary | ICD-10-CM

## 2023-06-18 PROCEDURE — 99213 OFFICE O/P EST LOW 20 MIN: CPT | Performed by: FAMILY MEDICINE

## 2023-06-18 RX ORDER — PREDNISONE 20 MG/1
40 TABLET ORAL DAILY
Qty: 10 TABLET | Refills: 0 | Status: SHIPPED | OUTPATIENT
Start: 2023-06-18 | End: 2023-06-23

## 2023-06-18 RX ORDER — DOXYCYCLINE 100 MG/1
100 CAPSULE ORAL 2 TIMES DAILY
Qty: 14 CAPSULE | Refills: 0 | Status: SHIPPED | OUTPATIENT
Start: 2023-06-18 | End: 2023-06-25

## 2023-06-18 ASSESSMENT — ENCOUNTER SYMPTOMS
FEVER: 0
WHEEZING: 1
SHORTNESS OF BREATH: 1

## 2023-06-18 NOTE — PROGRESS NOTES
Assessment & Plan     Acute non-recurrent sinusitis, unspecified location  3 weeks of symptoms, suspect bacterial, start doxycycline.  Has a known history of sinusitis and nasal polyps, if not better recommend ENT evaluation.  - doxycycline hyclate (VIBRAMYCIN) 100 MG capsule  Dispense: 14 capsule; Refill: 0    Mild persistent asthma with acute exacerbation  Minutes pneumonia, continue albuterol as needed, augment twice daily, start prednisone.  - predniSONE (DELTASONE) 20 MG tablet  Dispense: 10 tablet; Refill: 0        Return in about 1 week (around 6/25/2023) for If symptoms do not improve or gets worse..    Rishi Donovan MD  [facility] [place]aeza is a 63 year old, presenting for the following health issues:  URI (Cough, wheezing runny nose SOB X 3 weeks  - tested negative for covid - using Flovent everyday )         View : No data to display.              HPI     Patient is a pleasant 63-year-old female who presents with cough, wheezing, runny nose x3 weeks.  Negative home COVID test.      Review of Systems   Constitutional: Negative for fever.   HENT: Positive for congestion.    Respiratory: Positive for shortness of breath and wheezing.             Objective    BP (!) 156/71   Pulse 64   Temp 97.2  F (36.2  C) (Tympanic)   Resp 14   Wt 93 kg (205 lb)   LMP 09/29/2006   SpO2 98%   BMI 30.27 kg/m    Body mass index is 30.27 kg/m .  Physical Exam  HENT:      Nose: Congestion and rhinorrhea present.   Cardiovascular:      Rate and Rhythm: Normal rate and regular rhythm.   Pulmonary:      Effort: Pulmonary effort is normal. No respiratory distress.      Breath sounds: Wheezing (Expiratory wheeze in all fields) present.      Comments: No conversational dyspnea

## 2023-07-04 ENCOUNTER — OFFICE VISIT (OUTPATIENT)
Dept: URGENT CARE | Facility: URGENT CARE | Age: 63
End: 2023-07-04
Payer: COMMERCIAL

## 2023-07-04 ENCOUNTER — NURSE TRIAGE (OUTPATIENT)
Dept: NURSING | Facility: CLINIC | Age: 63
End: 2023-07-04
Payer: COMMERCIAL

## 2023-07-04 VITALS
SYSTOLIC BLOOD PRESSURE: 102 MMHG | HEART RATE: 74 BPM | TEMPERATURE: 98 F | RESPIRATION RATE: 20 BRPM | OXYGEN SATURATION: 96 % | DIASTOLIC BLOOD PRESSURE: 62 MMHG

## 2023-07-04 DIAGNOSIS — J98.8 WHEEZING-ASSOCIATED RESPIRATORY INFECTION (WARI): Primary | ICD-10-CM

## 2023-07-04 DIAGNOSIS — J45.901 EXACERBATION OF ASTHMA, UNSPECIFIED ASTHMA SEVERITY, UNSPECIFIED WHETHER PERSISTENT: ICD-10-CM

## 2023-07-04 PROCEDURE — 99214 OFFICE O/P EST MOD 30 MIN: CPT | Performed by: PHYSICIAN ASSISTANT

## 2023-07-04 RX ORDER — PREDNISONE 20 MG/1
40 TABLET ORAL DAILY
Qty: 10 TABLET | Refills: 0 | Status: SHIPPED | OUTPATIENT
Start: 2023-07-04 | End: 2023-07-09

## 2023-07-04 RX ORDER — DOXYCYCLINE 100 MG/1
100 CAPSULE ORAL 2 TIMES DAILY
Qty: 20 CAPSULE | Refills: 0 | Status: SHIPPED | OUTPATIENT
Start: 2023-07-04 | End: 2023-07-14

## 2023-07-04 NOTE — PROGRESS NOTES
ASSESSMENT/PLAN:    (J98.8) Wheezing-associated respiratory infection (WARI)  (primary encounter diagnosis)  MDM: Acute bronchitis (likely also associated sinusitis), with associated asthma exacerbation.  Improvement, but not full resolution, with last treatment of doxycycline x7 days and prednisone x5 days.  No respiratory distress requiring ER or inpatient management at this time.  Patient states amoxicillin and Augmentin do not work for her. She also has a history of rash with amoxicillin. Patient has a past history of cardiac arrhythmia, and states azithromycin has not worked for her historically, so I did not prescribe azithromycin today.  I prescribed another course of doxycycline x10 days, and prednisone x5 days.  Criteria for urgent and emergent follow-up are reviewed.    Plan: doxycycline monohydrate (MONODOX) 100 MG         capsule, predniSONE (DELTASONE) 20 MG tablet    July 4, 2023 [facility] Plan:     -Doxycycline twice daily x10 days  -Prednisone 40 mg once daily x5 days  -See primary care provider for a lung/asthma recheck in the next 7 to 10 days--given the recurrent versus persistent nature of her symptoms.  -Follow-up with primary care provider sooner if no improvement in 3 days, or if there is any acute worsening of current symptoms.  -I advised patient discuss potential need for chest x-ray at PCP follow-up visit if cough persists.  -We also discussed GERD could be causing some additional irritation and bronchospasms.  I have advised she discuss this further with PCP if symptoms persist.  -Criteria for urgent and emergent follow-up are reviewed  -Bronchitis with wheezing and sinusitis educational handouts are placed in my chart    (J45.901) Exacerbation of asthma, unspecified asthma severity, unspecified whether persistent  Plan: predniSONE (DELTASONE) 20 MG tablet        This progress note has been dictated, with use of voice recognition software. Any grammatical, typographical, or  context errors are unintentional and inherent to use of voice recognition software.      --------------------      SUBJECTIVE:    Felisha Gorman is a 63-year-old female, with a history of asthma (please see below for full past medical history), presenting to urgent care today for evaluation of productive cough, waxing and waning sore throat, and wheezing.    HPI patient was seen by her primary care provider on 6/18/2023--was diagnosed with acute nonrecurrent sinusitis and mild persistent asthma with acute exacerbation.  She was prescribed a 7-day course of doxycycline and a 5-day course of prednisone.  Patient reports interval improvement while taking the above medications, followed shortly thereafter by acute worsening/return to initial baseline symptoms.  She has been    -Using her Flovent inhaler, but states she has not been using her albuterol recently/states it has not been working since her wheezing worsened again.    -Of note: Patient also is awaiting EGD for chronic GERD.  She has changed her diet and modified her lifestyle lifestyle.  Patient reports she does continue to get some intermittent GERD, but has noted overall improvement/no worsening of her GERD symptoms.      ROS: No high fever or shaking chills. No associated severe cough,coughing up of blood, blue lips/fingers/toes, or severe shortness of breath (confirms they are still able to to all self cares and activities of daily living). No acute fainting, chest pain, racing or irregular heartbeats. No acute onset abdominal pain, nausea, vomiting, or diarrhea. No severe body aches, severe headaches, rashes, hives, joint swelling or other acute illness symptoms.     Past Medical History:   Diagnosis Date     Allergy, unspecified not elsewhere classified      Dysthymic disorder      Essential hypertension, benign      flight anxiety      Frequent BRONCHITIS     usually in winter     HSV (herpes simplex virus) anogenital infection      Impaired fasting  glucose 9/23/2007    Glucose 101 9/07     Left anterior hemiblock      Mild intermittent asthma     URI induced     Obstructive sleep apnea (adult) (pediatric)     not on CPAP; feels rested 2014     Other and unspecified hyperlipidemia      Palpitations     PVC and PVC bigmeny     Rheumatic fever without mention of heart involvement     no sequelae     transient neurologic sx 2005    ? TIA; had MRI with temporal lobe lesion that is getting smaller       Patient Active Problem List   Diagnosis     Allergic state     Dysthymic disorder     Mild intermittent asthma     Hypertension goal BP (blood pressure) < 140/90     Obstructive sleep apnea     Anxiety state     IMPAIRED FASTING GLUCOSE     Major depression in complete remission (H)     CARDIOVASCULAR SCREENING; LDL GOAL LESS THAN 160     Family history of ovarian cancer     Knee pain     Osteoarthritis, knee     Abnormal finding on MRI of brain     Radicular low back pain     Recurrent major depressive disorder, in partial remission (H)     Non morbid obesity, unspecified obesity type       Current Outpatient Medications   Medication     albuterol (VENTOLIN HFA) 108 (90 Base) MCG/ACT inhaler     ALPRAZolam (XANAX) 0.5 MG tablet     clobetasol (TEMOVATE) 0.05 % external ointment     fluticasone (FLONASE) 50 MCG/ACT nasal spray     fluticasone (FLOVENT HFA) 220 MCG/ACT inhaler     losartan (COZAAR) 25 MG tablet     nebivolol (BYSTOLIC) 5 MG tablet     omeprazole (PRILOSEC) 20 MG DR capsule     propranolol (INDERAL) 10 MG tablet     rosuvastatin (CRESTOR) 10 MG tablet     No current facility-administered medications for this visit.       Allergies   Allergen Reactions     Atorvastatin      upset     Penicillin [Penicillins] Rash         OBJECTIVE:  /62   Pulse 74   Temp 98  F (36.7  C)   Resp 20   LMP 09/29/2006   SpO2 96%       General appearance: alert and no apparent distress  Skin color is uniform in color and without rash.  HEENT:   Conjunctiva not  injected.  Sclera clear.  Left TM is normal: no effusions, no erythema, and normal landmarks.  Right TM is normal: no effusions, no erythema, and normal landmarks.  Nasal mucosa is congested  Oropharyngeal exam is normal other than postnasal drip/cobblestoning: no lesions, erythema, adenopathy or exudate.  NECK: Trachea is midline.  No JVD neck is supple, FROM with no adenopathy  CARDIAC:NORMAL - regular rate and rhythm without murmur.  RESP: No increased work of breathing at rest--able to speak multiple, sequential, full sentences without pause. Positive for scattered rhonchi and wheezes throughout.  No rales. Still moving air well into all listening areas today.   NEURO: Alert and oriented.  Normal speech and mentation.  CN II/XII grossly intact.  Gait within normal limits.      No results found for any visits on 07/04/23.      CHEST X-RAY: The option of chest ray is discussed/offered, but patient elected to forego x-ray today in favor of treatment and will follow-up if any worsening or non-resolution after treatment provided here today.

## 2023-07-04 NOTE — PATIENT INSTRUCTIONS
July 4, 2023 [facility] Plan:     -Doxycycline twice daily x10 days  -Prednisone 40 mg once daily x5 days  -See primary care provider for a lung/asthma recheck in the next 7 to 10 days--given the recurrent versus persistent nature of her symptoms.  -Follow-up with primary care provider sooner if no improvement in 3 days, or if there is any acute worsening of current symptoms.  -I advised patient discuss potential need for chest x-ray at PCP follow-up visit if cough persists.  -We also discussed GERD could be causing some additional irritation and bronchospasms.  I have advised she discuss this further with PCP if symptoms persist.  -Criteria for urgent and emergent follow-up are reviewed  -Bronchitis with wheezing and sinusitis educational handouts are placed in my chart

## 2023-07-04 NOTE — TELEPHONE ENCOUNTER
The patient is complaining of a sore throat and lingering cough. She was started on doxycycline for seven days and prednisone for 5 days for a sinus infection and mild persistent asthma exacerbation.   She reports her symptoms improved for five days and now they have returned.  She denies any fever, and says she had some ear pain and pressure that she relates to her sore throat.   Today she reports the ear pain and pressure has improved overnight.  Triage guidelines recommend to see pcp within 3 days  Caller verbalized and understands directives    Reason for Disposition    [1] Sinus infection AND [2] taking an antibiotic    [1] Taking antibiotic > 7 days AND [2] nasal discharge not improved    [1] Wet cough (productive; white-yellow, yellow, green, or dennis colored sputum) AND [2] < 3 weeks duration    Earache    Additional Information    Negative: SEVERE difficulty breathing (e.g., struggling for each breath, speaks in single words)    Negative: Sounds like a life-threatening emergency to the triager    Negative: SEVERE difficulty breathing (e.g., struggling for each breath, speaks in single words)    Negative: Sounds like a life-threatening emergency to the triager    Negative: [1] Difficulty breathing AND [2] not from stuffy nose (e.g., not relieved by cleaning out the nose)    Negative: [1] SEVERE headache AND [2] fever    Negative: [1] Taking antibiotic > 24 hours AND [2] fever > 103 F (39.4 C)    Negative: [1] Redness or swelling on the cheek, forehead or around the eye AND [2] fever    Negative: Patient sounds very sick or weak to the triager    Negative: [1] SEVERE sinus pain AND [2] not improved 2 hours after pain medicine    Negative: [1] Redness or swelling on the cheek, forehead or around the eye AND [2] new since starting antibiotics    Negative: [1] Taking antibiotic > 48 hours (2 days) AND [2] fever persists    Negative: [1] Taking antibiotic > 72 hours (3 days) AND [2] sinus pain not improved     Negative: SEVERE difficulty breathing (e.g., struggling for each breath, speaks in single words)    Negative: [1] Lips or face are bluish now AND [2] persists when not coughing    Negative: Sounds like a life-threatening emergency to the triager    Negative: Chest pain is main symptom    Negative: [1] Dry cough (non-productive;  no sputum or minimal clear sputum) AND [2] < 3 weeks duration    Negative: SEVERE difficulty breathing (e.g., struggling for each breath, speaks in single words)    Negative: Bluish (or gray) lips or face now    Negative: [1] Difficulty breathing AND [2] exposure to flames, smoke, or fumes    Negative: [1] Stridor AND [2] difficulty breathing    Negative: Sounds like a life-threatening emergency to the triager    Negative: [1] Previous asthma attacks AND [2] this feels like asthma attack    Negative: Dry cough (non-productive;  no sputum or minimal clear sputum)    Negative: [1] MODERATE difficulty breathing (e.g., speaks in phrases, SOB even at rest, pulse 100-120) AND [2] still present when not coughing    Negative: Chest pain  (Exception: MILD central chest pain, present only when coughing)    Negative: Patient sounds very sick or weak to the triager    Negative: [1] MILD difficulty breathing (e.g., minimal/no SOB at rest, SOB with walking, pulse <100) AND [2] still present when not coughing    Negative: [1] Coughed up blood AND [2] > 1 tablespoon (15 ml)  (Exception: Blood-tinged sputum.)    Negative: Fever > 103 F (39.4 C)    Negative: [1] Fever > 101 F (38.3 C) AND [2] age > 60 years    Negative: [1] Fever > 100.0 F (37.8 C) AND [2] bedridden (e.g., nursing home patient, CVA, chronic illness, recovering from surgery)    Negative: [1] Fever > 100.0 F (37.8 C) AND [2] diabetes mellitus or weak immune system (e.g., HIV positive, cancer chemo, splenectomy, organ transplant, chronic steroids)    Negative: Wheezing is present    Negative: SEVERE coughing spells (e.g., whooping sound after  coughing, vomiting after coughing)    Negative: [1] Continuous (nonstop) coughing interferes with work or school AND [2] no improvement using cough treatment per Care Advice    Negative: Coughing up dennis-colored (reddish-brown) sputum    Negative: Fever present > 3 days (72 hours)    Negative: [1] Fever returns after gone for over 24 hours AND [2] symptoms worse or not improved    Negative: [1] Using nasal washes and pain medicine > 24 hours AND [2] sinus pain (around cheekbone or eye) persists    Protocols used: INFECTION ON ANTIBIOTIC FOLLOW-UP CALL-A-AH, SINUS INFECTION ON ANTIBIOTIC FOLLOW-UP CALL-A-AH, COUGH - CHRONIC-A-AH, COUGH - ACUTE [id number]

## 2023-07-07 ENCOUNTER — HOSPITAL ENCOUNTER (EMERGENCY)
Facility: CLINIC | Age: 63
Discharge: HOME | End: 2023-07-07
Attending: EMERGENCY MEDICINE | Admitting: EMERGENCY MEDICINE
Payer: COMMERCIAL

## 2023-07-07 ENCOUNTER — APPOINTMENT (OUTPATIENT)
Dept: CT IMAGING | Facility: CLINIC | Age: 63
End: 2023-07-07
Attending: EMERGENCY MEDICINE
Payer: COMMERCIAL

## 2023-07-07 VITALS
SYSTOLIC BLOOD PRESSURE: 160 MMHG | OXYGEN SATURATION: 96 % | RESPIRATION RATE: 20 BRPM | HEART RATE: 79 BPM | DIASTOLIC BLOOD PRESSURE: 74 MMHG | TEMPERATURE: 98.6 F

## 2023-07-07 DIAGNOSIS — J44.1 OBSTRUCTIVE CHRONIC BRONCHITIS WITH EXACERBATION (H): ICD-10-CM

## 2023-07-07 LAB
ANION GAP SERPL CALCULATED.3IONS-SCNC: 12 MMOL/L (ref 7–15)
BASOPHILS # BLD AUTO: 0.1 10E3/UL (ref 0–0.2)
BASOPHILS NFR BLD AUTO: 1 %
BUN SERPL-MCNC: 16.6 MG/DL (ref 8–23)
CALCIUM SERPL-MCNC: 9.3 MG/DL (ref 8.8–10.2)
CHLORIDE SERPL-SCNC: 102 MMOL/L (ref 98–107)
CREAT SERPL-MCNC: 1.03 MG/DL (ref 0.51–0.95)
DEPRECATED HCO3 PLAS-SCNC: 27 MMOL/L (ref 22–29)
EOSINOPHIL # BLD AUTO: 0.4 10E3/UL (ref 0–0.7)
EOSINOPHIL NFR BLD AUTO: 3 %
ERYTHROCYTE [DISTWIDTH] IN BLOOD BY AUTOMATED COUNT: 13.3 % (ref 10–15)
GFR SERPL CREATININE-BSD FRML MDRD: 60 ML/MIN/1.73M2
GLUCOSE SERPL-MCNC: 90 MG/DL (ref 70–99)
HCT VFR BLD AUTO: 44.1 % (ref 35–47)
HGB BLD-MCNC: 14.5 G/DL (ref 11.7–15.7)
HOLD SPECIMEN: NORMAL
HOLD SPECIMEN: NORMAL
IMM GRANULOCYTES # BLD: 0.1 10E3/UL
IMM GRANULOCYTES NFR BLD: 0 %
LYMPHOCYTES # BLD AUTO: 4.3 10E3/UL (ref 0.8–5.3)
LYMPHOCYTES NFR BLD AUTO: 35 %
MCH RBC QN AUTO: 29.2 PG (ref 26.5–33)
MCHC RBC AUTO-ENTMCNC: 32.9 G/DL (ref 31.5–36.5)
MCV RBC AUTO: 89 FL (ref 78–100)
MONOCYTES # BLD AUTO: 0.9 10E3/UL (ref 0–1.3)
MONOCYTES NFR BLD AUTO: 7 %
NEUTROPHILS # BLD AUTO: 6.7 10E3/UL (ref 1.6–8.3)
NEUTROPHILS NFR BLD AUTO: 54 %
NRBC # BLD AUTO: 0 10E3/UL
NRBC BLD AUTO-RTO: 0 /100
PLATELET # BLD AUTO: 372 10E3/UL (ref 150–450)
POTASSIUM SERPL-SCNC: 3.8 MMOL/L (ref 3.4–5.3)
RBC # BLD AUTO: 4.97 10E6/UL (ref 3.8–5.2)
SODIUM SERPL-SCNC: 141 MMOL/L (ref 136–145)
WBC # BLD AUTO: 12.4 10E3/UL (ref 4–11)

## 2023-07-07 PROCEDURE — 258N000003 HC RX IP 258 OP 636: Performed by: EMERGENCY MEDICINE

## 2023-07-07 PROCEDURE — 36415 COLL VENOUS BLD VENIPUNCTURE: CPT | Performed by: EMERGENCY MEDICINE

## 2023-07-07 PROCEDURE — 85025 COMPLETE CBC W/AUTO DIFF WBC: CPT | Performed by: EMERGENCY MEDICINE

## 2023-07-07 PROCEDURE — 99285 EMERGENCY DEPT VISIT HI MDM: CPT | Mod: 25

## 2023-07-07 PROCEDURE — 80048 BASIC METABOLIC PNL TOTAL CA: CPT | Performed by: EMERGENCY MEDICINE

## 2023-07-07 PROCEDURE — 250N000011 HC RX IP 250 OP 636: Performed by: EMERGENCY MEDICINE

## 2023-07-07 PROCEDURE — 96360 HYDRATION IV INFUSION INIT: CPT | Mod: 59

## 2023-07-07 PROCEDURE — 71275 CT ANGIOGRAPHY CHEST: CPT

## 2023-07-07 RX ORDER — LEVOFLOXACIN 500 MG/1
500 TABLET, FILM COATED ORAL DAILY
Qty: 7 TABLET | Refills: 0 | Status: SHIPPED | OUTPATIENT
Start: 2023-07-07 | End: 2023-07-14

## 2023-07-07 RX ORDER — IOPAMIDOL 755 MG/ML
500 INJECTION, SOLUTION INTRAVASCULAR ONCE
Status: COMPLETED | OUTPATIENT
Start: 2023-07-07 | End: 2023-07-07

## 2023-07-07 RX ORDER — PREDNISONE 10 MG/1
TABLET ORAL
Qty: 32 TABLET | Refills: 0 | Status: SHIPPED | OUTPATIENT
Start: 2023-07-07 | End: 2023-07-17

## 2023-07-07 RX ORDER — BENZONATATE 200 MG/1
200 CAPSULE ORAL 3 TIMES DAILY PRN
Qty: 30 CAPSULE | Refills: 0 | Status: SHIPPED | OUTPATIENT
Start: 2023-07-07 | End: 2024-07-10

## 2023-07-07 RX ORDER — GUAIFENESIN 600 MG/1
600 TABLET, EXTENDED RELEASE ORAL 2 TIMES DAILY
Qty: 30 TABLET | Refills: 0 | Status: SHIPPED | OUTPATIENT
Start: 2023-07-07 | End: 2024-07-10

## 2023-07-07 RX ADMIN — SODIUM CHLORIDE 100 ML: 9 INJECTION, SOLUTION INTRAVENOUS at 18:52

## 2023-07-07 RX ADMIN — IOPAMIDOL 80 ML: 755 INJECTION, SOLUTION INTRAVENOUS at 18:52

## 2023-07-07 ASSESSMENT — ACTIVITIES OF DAILY LIVING (ADL)
ADLS_ACUITY_SCORE: 33
ADLS_ACUITY_SCORE: 35

## 2023-07-07 NOTE — ED PROVIDER NOTES
"  History     Chief Complaint:  Cough       HPI   Felisha Gorman is a 63 year old female with a history of hypertension who presents to the ED today with a cough. Patient reports that she has had an on and off cough for 3 months and comes in today due to seeing blood in phlegm this morning and otalgia. Notes her right lung feels \"heavier.\" She has been diagnosed with asthma and bronchitis multiple times as well as prescribed prednisone and doxycycline a few times. She states these eliminate her cough for a few days before it returns. She was prescribed doxycycline and prednisone 2 days ago by Urgent Care. Patient is not taking the doxycycline after PCP recommendation due to it being soon after her last dose. Denies recent surgery or history of blood clots.      Independent Historian:   None - Patient Only    Review of External Notes:       Medications:    Albuterol   Alprazolam  Doxycycline- patient reports not taking  Losartan   Nebivolol  Omeprazole   Prednisone  Propranolol  Rosuvastatin     Past Medical History:    Dysthymic disorder  Hypertension   HSV anogenital infection   Left anterior hemiblock  Asthma   ELO  Hyperlipidemia   Palpitations     Past Surgical History:    Colonoscopy x2   Endometrial ablation   Bilateral breast reduction and tummy tuck  Tonsillectomy   Nasal polyp removed     Physical Exam     Patient Vitals for the past 24 hrs:   BP Temp Temp src Pulse Resp SpO2   07/07/23 1519 (!) 160/74 98.6  F (37  C) Temporal 79 20 96 %      Physical Exam  Vitals reviewed.   Constitutional:       Appearance: She is obese.   HENT:      Right Ear: Tympanic membrane normal.      Left Ear: Tympanic membrane normal.      Nose: Nose normal.      Mouth/Throat:      Mouth: Mucous membranes are moist.   Eyes:      Pupils: Pupils are equal, round, and reactive to light.   Cardiovascular:      Rate and Rhythm: Normal rate.   Pulmonary:      Effort: Pulmonary effort is normal.      Breath sounds: Wheezing present. "   Abdominal:      General: Abdomen is flat.   Skin:     General: Skin is warm.      Capillary Refill: Capillary refill takes less than 2 seconds.   Neurological:      General: No focal deficit present.      Mental Status: She is alert and oriented to person, place, and time.   Psychiatric:         Mood and Affect: Mood normal.           Emergency Department Course   Imaging:  CT Chest Pulmonary Embolism w Contrast   Final Result   IMPRESSION:      1.  No pulmonary embolism.      2.  Findings of infectious or inflammatory airways disease. Mild - moderate airway thickening. Moderate lower lobe predominant scattered airway debris / mucous plugging.      3.  Few minimal clustered inflammatory micronodules in the lungs.      4.  Mild mosaicism from small airways disease or gas trapping.             Report per radiology    Laboratory:  Labs Ordered and Resulted from Time of ED Arrival to Time of ED Departure   BASIC METABOLIC PANEL - Abnormal       Result Value    Sodium 141      Potassium 3.8      Chloride 102      Carbon Dioxide (CO2) 27      Anion Gap 12      Urea Nitrogen 16.6      Creatinine 1.03 (*)     Calcium 9.3      Glucose 90      GFR Estimate 60 (*)    CBC WITH PLATELETS AND DIFFERENTIAL - Abnormal    WBC Count 12.4 (*)     RBC Count 4.97      Hemoglobin 14.5      Hematocrit 44.1      MCV 89      MCH 29.2      MCHC 32.9      RDW 13.3      Platelet Count 372      % Neutrophils 54      % Lymphocytes 35      % Monocytes 7      % Eosinophils 3      % Basophils 1      % Immature Granulocytes 0      NRBCs per 100 WBC 0      Absolute Neutrophils 6.7      Absolute Lymphocytes 4.3      Absolute Monocytes 0.9      Absolute Eosinophils 0.4      Absolute Basophils 0.1      Absolute Immature Granulocytes 0.1      Absolute NRBCs 0.0        Procedures   None    Emergency Department Course & Assessments:       Interventions:  Medications   0.9% sodium chloride BOLUS (100 mLs Intravenous $New Bag 7/7/23 7514)   iopamidol  (ISOVUE-370) solution 500 mL (80 mLs Intravenous $Given 7/7/23 3552)      Assessments:  1747 I obtained history and examined the patient as noted above.     Independent Interpretation (X-rays, CTs, rhythm strip):  None    Consultations/Discussion of Management or Tests:  None        Social Determinants of Health affecting care:   None    Disposition:  The patient was discharged to home.     Impression & Plan    Medical Decision Making:  Patient presents with a chronic cough for about 2 months.  Has been treated already with antibiotics without relief.  Patient is 63 and due to chronic coughing recommended CT of the chest for complete assessment.  This was negative for mass or for inflammation of the lungs but does identify with likely COPD and bronchitis.  I due to ongoing coughing and phlegm production cannot rule out pseudomonal infection.  We will offer a short course of Levaquin to cover Pseudomonas and follow-up with pulmonary as an outpatient if ongoing chronic coughing.  Patient saturations stable respiratory rate normal discharged home in stable condition.    Diagnosis:    ICD-10-CM    1. Obstructive chronic bronchitis with exacerbation (H)  J44.1            Discharge Medications:  Discharge Medication List as of 7/7/2023  7:26 PM      START taking these medications    Details   benzonatate (TESSALON) 200 MG capsule Take 1 capsule (200 mg) by mouth 3 times daily as needed for cough, Disp-30 capsule, R-0, Local Print      guaiFENesin (MUCINEX) 600 MG 12 hr tablet Take 1 tablet (600 mg) by mouth 2 times daily, Disp-30 tablet, R-0, Local Print      levofloxacin (LEVAQUIN) 500 MG tablet Take 1 tablet (500 mg) by mouth daily for 7 days, Disp-7 tablet, R-0, Local Print      !! predniSONE (DELTASONE) 10 MG tablet Take 4 tablets daily for 5 days,  take 2 tablets daily for 3 days, take 1 tablet daily for 3 days, take half a tablet for 3 days., Disp-32 tablet, R-0, Local Print       !! - Potential duplicate  medications found. Please discuss with provider.        Scribe Disclosure:  I, Britany Brendan, am serving as a scribe at 6:07 PM on 7/7/2023 to document services personally performed by Remy Yi MD based on my observations and the provider's statements to me.     7/7/2023   Remy Yi MD Goodman, Brian Samuel, MD  07/12/23 3054

## 2023-07-07 NOTE — ED TRIAGE NOTES
"Intermittent cough x \"couple of months\" had two rounds of doxycycline already.     Triage Assessment     Row Name 07/07/23 7077       Triage Assessment (Adult)    Airway WDL WDL       Respiratory WDL    Respiratory WDL WDL       Skin Circulation/Temperature WDL    Skin Circulation/Temperature WDL WDL       Cardiac WDL    Cardiac WDL WDL       Peripheral/Neurovascular WDL    Peripheral Neurovascular WDL WDL       Cognitive/Neuro/Behavioral WDL    Cognitive/Neuro/Behavioral WDL WDL              "

## 2023-07-08 NOTE — DISCHARGE INSTRUCTIONS
He your oxygen levels are normal.  Please continue long taper of prednisone.  Continue usual inhaler use cough medication Mucinex when needed due to evidence of inflammation of the bronchus we are trialing Levaquin as an antibiotic due to the long history of coughing and upper congestion and now black.  If you do not improve please follow-up with the pulmonologist for reassessment return with severe increase in shortness of breath or chest pain.

## 2023-07-11 ENCOUNTER — TRANSFERRED RECORDS (OUTPATIENT)
Dept: HEALTH INFORMATION MANAGEMENT | Facility: CLINIC | Age: 63
End: 2023-07-11
Payer: COMMERCIAL

## 2023-10-04 ENCOUNTER — TRANSFERRED RECORDS (OUTPATIENT)
Dept: HEALTH INFORMATION MANAGEMENT | Facility: CLINIC | Age: 63
End: 2023-10-04

## 2023-11-20 ENCOUNTER — TRANSFERRED RECORDS (OUTPATIENT)
Dept: HEALTH INFORMATION MANAGEMENT | Facility: CLINIC | Age: 63
End: 2023-11-20

## 2023-12-03 ENCOUNTER — OFFICE VISIT (OUTPATIENT)
Dept: URGENT CARE | Facility: URGENT CARE | Age: 63
End: 2023-12-03
Payer: COMMERCIAL

## 2023-12-03 VITALS
HEIGHT: 69 IN | OXYGEN SATURATION: 97 % | BODY MASS INDEX: 30.36 KG/M2 | WEIGHT: 205 LBS | TEMPERATURE: 98.3 F | SYSTOLIC BLOOD PRESSURE: 135 MMHG | HEART RATE: 88 BPM | DIASTOLIC BLOOD PRESSURE: 83 MMHG

## 2023-12-03 DIAGNOSIS — R05.1 ACUTE COUGH: Primary | ICD-10-CM

## 2023-12-03 DIAGNOSIS — J45.41 MODERATE PERSISTENT ASTHMA WITH EXACERBATION: ICD-10-CM

## 2023-12-03 DIAGNOSIS — J31.0 CHRONIC RHINITIS: ICD-10-CM

## 2023-12-03 PROCEDURE — 99214 OFFICE O/P EST MOD 30 MIN: CPT | Performed by: PHYSICIAN ASSISTANT

## 2023-12-03 RX ORDER — FLUTICASONE FUROATE, UMECLIDINIUM BROMIDE AND VILANTEROL TRIFENATATE 100; 62.5; 25 UG/1; UG/1; UG/1
POWDER RESPIRATORY (INHALATION)
COMMUNITY
End: 2024-08-23 | Stop reason: ALTCHOICE

## 2023-12-03 RX ORDER — CETIRIZINE HYDROCHLORIDE 10 MG/1
10 TABLET ORAL PRN
COMMUNITY

## 2023-12-03 RX ORDER — PREDNISONE 20 MG/1
40 TABLET ORAL DAILY
Qty: 10 TABLET | Refills: 0 | Status: SHIPPED | OUTPATIENT
Start: 2023-12-03 | End: 2023-12-08

## 2023-12-08 ENCOUNTER — NURSE TRIAGE (OUTPATIENT)
Dept: FAMILY MEDICINE | Facility: CLINIC | Age: 63
End: 2023-12-08
Payer: COMMERCIAL

## 2023-12-08 NOTE — TELEPHONE ENCOUNTER
Nurse Triage SBAR    Is this a 2nd Level Triage? NO    Situation: persistent cough    Background: seen in UC on 12/3. Dx with acute cough, asthma exacerbation. Prescribed prednisone prescription.     Assessment: patient states cough is persisting, prednisone has not helped. Noted slight improvement at first, but has persisted. Having some wheezing today.     Protocol Recommended Disposition:   Go To Office Now    Recommendation: advised to use albuterol inhaler now for wheezing, and if not helping to be seen in UC today. Patient stated an understanding and agreed with plan.        Does the patient meet one of the following criteria for ADS visit consideration? 16+ years old, with an [facility] PCP     TIP  Providers, please consider if this condition is appropriate for management at one of our Acute and Diagnostic Services sites.     If patient is a good candidate, please use dotphrase <dot>triageresponse and select Refer to ADS to document.       Reason for Disposition   Wheezing is present    Protocols used: Cough-A-OH

## 2024-04-04 ENCOUNTER — TRANSFERRED RECORDS (OUTPATIENT)
Dept: HEALTH INFORMATION MANAGEMENT | Facility: CLINIC | Age: 64
End: 2024-04-04
Payer: COMMERCIAL

## 2024-04-30 ENCOUNTER — HOSPITAL ENCOUNTER (OUTPATIENT)
Dept: CARDIOLOGY | Facility: CLINIC | Age: 64
Discharge: HOME | End: 2024-04-30
Attending: NURSE PRACTITIONER | Admitting: NURSE PRACTITIONER
Payer: COMMERCIAL

## 2024-04-30 DIAGNOSIS — I10 BENIGN ESSENTIAL HYPERTENSION: ICD-10-CM

## 2024-04-30 LAB — LVEF ECHO: NORMAL

## 2024-04-30 PROCEDURE — 93306 TTE W/DOPPLER COMPLETE: CPT | Mod: 26 | Performed by: INTERNAL MEDICINE

## 2024-04-30 PROCEDURE — 93306 TTE W/DOPPLER COMPLETE: CPT

## 2024-05-01 ENCOUNTER — TELEPHONE (OUTPATIENT)
Dept: CARDIOLOGY | Facility: CLINIC | Age: 64
End: 2024-05-01
Payer: COMMERCIAL

## 2024-05-01 NOTE — TELEPHONE ENCOUNTER
----- Message from Cassidy Cortez NP sent at 5/1/2024  9:59 AM CDT -----  Please let Felisha know her echocardiogram findings are stable. No changes when compared to the previous. She is also over-due to follow up with Dr. Grififth if you can please assist her to arrange. Thanks.

## 2024-05-01 NOTE — TELEPHONE ENCOUNTER
Received call from pt stating that she had a colonoscopy done & states she had an EKG done that she thinks showed a left bundle branch block. Pt states she left her appt thinking she was going to die. Pt advised that her echo was normal, no concerning findings were seen. Pt advised to have the EKG that was done faxed to cardiology for review. Pt states it was also only a 3 lead EKG, so not sure how accurate it is. Await EKG for review. Lupis HOROWITZ

## 2024-05-10 ENCOUNTER — TRANSFERRED RECORDS (OUTPATIENT)
Dept: HEALTH INFORMATION MANAGEMENT | Facility: CLINIC | Age: 64
End: 2024-05-10
Payer: COMMERCIAL

## 2024-05-14 ENCOUNTER — MYC MEDICAL ADVICE (OUTPATIENT)
Dept: CARDIOLOGY | Facility: CLINIC | Age: 64
End: 2024-05-14
Payer: COMMERCIAL

## 2024-05-17 DIAGNOSIS — I10 BENIGN ESSENTIAL HYPERTENSION: Primary | ICD-10-CM

## 2024-05-17 DIAGNOSIS — E78.5 HYPERLIPIDEMIA LDL GOAL <100: ICD-10-CM

## 2024-05-23 ENCOUNTER — LAB (OUTPATIENT)
Dept: LAB | Facility: CLINIC | Age: 64
End: 2024-05-23
Payer: COMMERCIAL

## 2024-05-23 DIAGNOSIS — E78.5 HYPERLIPIDEMIA LDL GOAL <100: ICD-10-CM

## 2024-05-23 DIAGNOSIS — I10 BENIGN ESSENTIAL HYPERTENSION: ICD-10-CM

## 2024-05-23 LAB
ALT SERPL W P-5'-P-CCNC: 38 U/L (ref 0–50)
ANION GAP SERPL CALCULATED.3IONS-SCNC: 11 MMOL/L (ref 7–15)
BUN SERPL-MCNC: 18.4 MG/DL (ref 8–23)
CALCIUM SERPL-MCNC: 8.8 MG/DL (ref 8.8–10.2)
CHLORIDE SERPL-SCNC: 106 MMOL/L (ref 98–107)
CHOLEST SERPL-MCNC: 173 MG/DL
CREAT SERPL-MCNC: 0.84 MG/DL (ref 0.51–0.95)
DEPRECATED HCO3 PLAS-SCNC: 23 MMOL/L (ref 22–29)
EGFRCR SERPLBLD CKD-EPI 2021: 76 ML/MIN/1.73M2
FASTING STATUS PATIENT QL REPORTED: YES
GLUCOSE SERPL-MCNC: 98 MG/DL (ref 70–99)
HDLC SERPL-MCNC: 41 MG/DL
LDLC SERPL CALC-MCNC: 101 MG/DL
NONHDLC SERPL-MCNC: 132 MG/DL
POTASSIUM SERPL-SCNC: 3.9 MMOL/L (ref 3.4–5.3)
SODIUM SERPL-SCNC: 140 MMOL/L (ref 135–145)
TRIGL SERPL-MCNC: 157 MG/DL

## 2024-05-23 PROCEDURE — 84460 ALANINE AMINO (ALT) (SGPT): CPT | Performed by: NURSE PRACTITIONER

## 2024-05-23 PROCEDURE — 80061 LIPID PANEL: CPT | Performed by: NURSE PRACTITIONER

## 2024-05-23 PROCEDURE — 80048 BASIC METABOLIC PNL TOTAL CA: CPT | Performed by: NURSE PRACTITIONER

## 2024-05-23 PROCEDURE — 36415 COLL VENOUS BLD VENIPUNCTURE: CPT | Performed by: NURSE PRACTITIONER

## 2024-05-26 ENCOUNTER — HEALTH MAINTENANCE LETTER (OUTPATIENT)
Age: 64
End: 2024-05-26

## 2024-05-28 ENCOUNTER — OFFICE VISIT (OUTPATIENT)
Dept: CARDIOLOGY | Facility: CLINIC | Age: 64
End: 2024-05-28
Payer: COMMERCIAL

## 2024-05-28 VITALS
SYSTOLIC BLOOD PRESSURE: 160 MMHG | DIASTOLIC BLOOD PRESSURE: 76 MMHG | OXYGEN SATURATION: 99 % | HEART RATE: 66 BPM | HEIGHT: 69 IN | WEIGHT: 213.9 LBS | BODY MASS INDEX: 31.68 KG/M2

## 2024-05-28 DIAGNOSIS — E78.5 HYPERLIPIDEMIA LDL GOAL <100: ICD-10-CM

## 2024-05-28 DIAGNOSIS — I10 BENIGN ESSENTIAL HYPERTENSION: ICD-10-CM

## 2024-05-28 DIAGNOSIS — R00.2 PALPITATIONS: ICD-10-CM

## 2024-05-28 DIAGNOSIS — I10 HYPERTENSION GOAL BP (BLOOD PRESSURE) < 140/90: ICD-10-CM

## 2024-05-28 PROCEDURE — 99214 OFFICE O/P EST MOD 30 MIN: CPT | Performed by: INTERNAL MEDICINE

## 2024-05-28 RX ORDER — PROPRANOLOL HYDROCHLORIDE 10 MG/1
10-20 TABLET ORAL DAILY PRN
Qty: 10 TABLET | Refills: 2 | Status: SHIPPED | OUTPATIENT
Start: 2024-05-28

## 2024-05-28 RX ORDER — NEBIVOLOL 5 MG/1
5 TABLET ORAL DAILY
Qty: 90 TABLET | Refills: 2 | Status: CANCELLED | OUTPATIENT
Start: 2024-05-28

## 2024-05-28 RX ORDER — ROSUVASTATIN CALCIUM 10 MG/1
10 TABLET, COATED ORAL DAILY
Qty: 90 TABLET | Refills: 3 | Status: SHIPPED | OUTPATIENT
Start: 2024-05-28

## 2024-05-28 RX ORDER — LOSARTAN POTASSIUM 25 MG/1
25 TABLET ORAL 2 TIMES DAILY
Qty: 180 TABLET | Refills: 3 | Status: SHIPPED | OUTPATIENT
Start: 2024-05-28

## 2024-05-28 RX ORDER — DILTIAZEM HYDROCHLORIDE 240 MG/1
240 CAPSULE, EXTENDED RELEASE ORAL EVERY MORNING
Qty: 30 CAPSULE | Refills: 11 | Status: SHIPPED | OUTPATIENT
Start: 2024-05-28 | End: 2024-05-29

## 2024-05-28 NOTE — PROGRESS NOTES
HPI and Plan:   I the pleasure of seeing Felisha in follow-up please see my original note.  It turns out she is actually stopped her cholesterol pill and her blood pressure pill for the last couple weeks so the numbers are getting here not real.  She thought that perhaps the Bystolic was causing her asthma to be worse she is now seeing a lung specialist.    I chosen Bystolic because she notably had blood pressure issues but she had palpitations and the Bystolic and perhaps bisoprolol or the 2 beta-blockers least likely cause asthma.  Since she is off of it anyways I am to switch her to diltiazem 240 mg a day I did explain to her that does not have the mild anxiolytic properties of the beta-blocker dose it may not stop all palpitations as well I am and increase losartan from 25 once a day to 25 twice daily.  My nurse got a blood pressure 160 systolic I got a blood pressure of 170 systolic when I checked.  I suspect we can have to go higher dose medications.    Her lipid numbers reviewed but she did off her cholesterol pill so organ to recheck them in a month.  We did talk about weight loss and the GLP medications she will talk to her internist about that she just started her CPAP machine for sleep apnea they may help a little bit too.  Today's visit was 34 minutes all counseling we reviewed her actual echocardiogram she does have mild LVH she has no LV outflow tract obstruction reviewed her blood work going back 20 years including lipids electrolytes hemoglobin A1c we will see her back in 1 month have asked her to to see if she can find her old blood pressure machine or by  And check several readings over the course of the next month before she comes back we talked again about metabolic syndrome in depth and diet and exercise    Orders Placed This Encounter   Procedures    Lipid Profile    Follow-Up with Cardiology EFRAIN     Orders Placed This Encounter   Medications    losartan (COZAAR) 25 MG tablet     Sig: Take 1  tablet (25 mg) by mouth 2 times daily     Dispense:  180 tablet     Refill:  3    propranolol (INDERAL) 10 MG tablet     Sig: Take 1-2 tablets (10-20 mg) by mouth daily as needed (palpitations)     Dispense:  10 tablet     Refill:  2    rosuvastatin (CRESTOR) 10 MG tablet     Sig: Take 1 tablet (10 mg) by mouth daily     Dispense:  90 tablet     Refill:  3    diltiazem ER (DILT-XR) 240 MG 24 hr ER beaded capsule     Sig: Take 1 capsule (240 mg) by mouth every morning     Dispense:  30 capsule     Refill:  11     Medications Discontinued During This Encounter   Medication Reason    propranolol (INDERAL) 10 MG tablet Reorder (No AVS)    losartan (COZAAR) 25 MG tablet Reorder (No AVS)    rosuvastatin (CRESTOR) 10 MG tablet Reorder (No AVS)         Encounter Diagnoses   Name Primary?    Hypertension goal BP (blood pressure) < 140/90     Benign essential hypertension     Palpitations     Hyperlipidemia LDL goal <100        CURRENT MEDICATIONS:  Current Outpatient Medications   Medication Sig Dispense Refill    diltiazem ER (DILT-XR) 240 MG 24 hr ER beaded capsule Take 1 capsule (240 mg) by mouth every morning 30 capsule 11    fluticasone (FLONASE) 50 MCG/ACT nasal spray Spray 1 spray into both nostrils daily 16 g 0    guaiFENesin (MUCINEX) 600 MG 12 hr tablet Take 1 tablet (600 mg) by mouth 2 times daily (Patient taking differently: Take 600 mg by mouth as needed) 30 tablet 0    losartan (COZAAR) 25 MG tablet Take 1 tablet (25 mg) by mouth 2 times daily 180 tablet 3    nebivolol (BYSTOLIC) 5 MG tablet Take 1 tablet (5 mg) by mouth daily (Patient taking differently: Take 2.5 mg by mouth daily Pt is taking half a tablet (2.5 mg)) 90 tablet 2    omeprazole (PRILOSEC) 20 MG DR capsule Take 1 capsule (20 mg) by mouth daily (Patient taking differently: Take 20 mg by mouth as needed) 90 capsule 2    propranolol (INDERAL) 10 MG tablet Take 1-2 tablets (10-20 mg) by mouth daily as needed (palpitations) 10 tablet 2    rosuvastatin  (CRESTOR) 10 MG tablet Take 1 tablet (10 mg) by mouth daily 90 tablet 3    TRELEGY ELLIPTA 100-62.5-25 MCG/ACT oral inhaler INHALE 1 PUFF BY MOUTH AT THE SAME TIME EVERY DAY. RINSE AND SPIT AFTER USE      albuterol (PROAIR HFA/PROVENTIL HFA/VENTOLIN HFA) 108 (90 Base) MCG/ACT inhaler Inhale 2 puffs into the lungs every 6 hours as needed for shortness of breath, wheezing or cough (Patient not taking: Reported on 5/28/2024) 18 g 0    albuterol (VENTOLIN HFA) 108 (90 Base) MCG/ACT inhaler Inhale 2 puffs into the lungs every 6 hours (Patient not taking: Reported on 12/3/2023) 18 g 3    ALPRAZolam (XANAX) 0.5 MG tablet Take 1 tablet (0.5 mg) by mouth 3 times daily as needed for anxiety (Patient not taking: Reported on 12/3/2023) 20 tablet 0    benzonatate (TESSALON) 200 MG capsule Take 1 capsule (200 mg) by mouth 3 times daily as needed for cough (Patient not taking: Reported on 12/3/2023) 30 capsule 0    cetirizine (ZYRTEC) 10 MG tablet Take 10 mg by mouth daily (Patient not taking: Reported on 5/28/2024)      clobetasol (TEMOVATE) 0.05 % external ointment Apply topically 2 times daily . 2 weeks on, 2 weeks off. External genitalia only. (Patient not taking: Reported on 12/3/2023) 60 g 1       ALLERGIES     Allergies   Allergen Reactions    Atorvastatin      upset    Penicillin [Penicillins] Rash       PAST MEDICAL HISTORY:  Past Medical History:   Diagnosis Date    Allergy, unspecified not elsewhere classified     Dysthymic disorder     Essential hypertension, benign     flight anxiety     Frequent BRONCHITIS     usually in winter    HSV (herpes simplex virus) anogenital infection     Impaired fasting glucose 09/23/2007    Glucose 101 9/07    Left anterior hemiblock     Mild intermittent asthma     URI induced    Obstructive sleep apnea (adult) (pediatric)     started cpap 5/2024    Other and unspecified hyperlipidemia     Palpitations     PVC and PVC bigmeny    Rheumatic fever without mention of heart involvement      no sequelae    transient neurologic sx     ? TIA; had MRI with temporal lobe lesion that is getting smaller       PAST SURGICAL HISTORY:  Past Surgical History:   Procedure Laterality Date    COLONOSCOPY  2009    hyperplastic polyp; repeat 10 years    COLONOSCOPY  2020    repeat 5 years; tubular adenoma    SURGICAL HISTORY OF -   2006    endometrial ablation    SURGICAL HISTORY OF -   15    bilateral breast reduction and tummy tuck    TONSILLECTOMY      ZZC NONSPECIFIC PROCEDURE  2002    nasal polyp removed       FAMILY HISTORY:  Family History   Problem Relation Age of Onset    C.GIANCARLO Brother         age 50    Heart Disease Brother     JORJE.AAIMEE Mother     Heart Disease Mother     Cancer Mother         ovarian [place] now    Cancer - colorectal Mother         cancerous polyp    Dementia Mother     C.A.D. Father         MI    Heart Disease Father        SOCIAL HISTORY:  Social History     Socioeconomic History    Marital status:      Spouse name: Dorian    Number of children: 1    Years of education: None    Highest education level: None   Occupational History    Occupation:       Employer: [employer]   Tobacco Use    Smoking status: Former     Current packs/day: 0.00     Average packs/day: 2.0 packs/day for 10.0 years (20.0 ttl pk-yrs)     Types: Cigarettes     Start date: 1976     Quit date: 1986     Years since quittin.4    Smokeless tobacco: Never   Vaping Use    Vaping status: Never Used   Substance and Sexual Activity    Alcohol use: Not Currently     Alcohol/week: 0.0 - 0.8 standard drinks of alcohol    Drug use: No    Sexual activity: Yes     Partners: Male     Birth control/protection: Condom   Other Topics Concern    Special Diet No     Comment: stress eating recently    Exercise No    Parent/sibling w/ CABG, MI or angioplasty before 65F 55M? Yes       Review of Systems:  Skin:  not assessed     Eyes:  not assessed    ENT:  not  "assessed    Respiratory:    shortness of breath;dyspnea on exertion;cough;sleep apnea;CPAP  Cardiovascular:    palpitations;edema;fatigue;lightheadedness;dizziness  Gastroenterology: not assessed    Genitourinary:  not assessed    Musculoskeletal:  not assessed    Neurologic:  not assessed    Psychiatric:  not assessed    Heme/Lymph/Imm:  not assessed    Endocrine:  not assessed      Physical Exam:  Vitals: BP (!) 160/76 (BP Location: Right arm, Cuff Size: Adult Large)   Pulse 66   Ht 1.753 m (5' 9\")   Wt 97 kg (213 lb 14.4 oz)   LMP 09/29/2006   SpO2 99%   BMI 31.59 kg/m   Orthostatic Vitals from 05/26/24 1138 to 05/28/24 1138    Date and Time Orthostatic BP Orthostatic Pulse Patient Position BP   Location Cuff Size   05/28/24 1029 -- -- -- Right arm Adult Large         Constitutional:           Skin:             Head:           Eyes:           Lymph:      ENT:           Neck:           Respiratory:            Cardiac:                                                           GI:           Extremities and Muscular Skeletal:                 Neurological:           Psych:         Recent Lab Results:  LIPID RESULTS:  Lab Results   Component Value Date    CHOL 173 05/23/2024    CHOL 220 (H) 04/30/2021    HDL 41 (L) 05/23/2024    HDL 45 (L) 04/30/2021     (H) 05/23/2024     (H) 04/30/2021    TRIG 157 (H) 05/23/2024    TRIG 152 (H) 04/30/2021    CHOLHDLRATIO 5.5 (H) 03/25/2014       LIVER ENZYME RESULTS:  Lab Results   Component Value Date    AST 23 02/28/2023    AST 15 01/13/2020    ALT 38 05/23/2024    ALT 9 01/28/2020       CBC RESULTS:  Lab Results   Component Value Date    WBC 12.4 (H) 07/07/2023    WBC 12.1 (H) 01/01/2021    RBC 4.97 07/07/2023    RBC 4.86 01/01/2021    HGB 14.5 07/07/2023    HGB 14.7 01/01/2021    HCT 44.1 07/07/2023    HCT 45.5 01/01/2021    MCV 89 07/07/2023    MCV 94 01/01/2021    MCH 29.2 07/07/2023    MCH 30.2 01/01/2021    MCHC 32.9 07/07/2023    [facility] 32.3 01/01/2021    " RDW 13.3 07/07/2023    RDW 12.9 01/01/2021     07/07/2023     01/01/2021       BMP RESULTS:  Lab Results   Component Value Date     05/23/2024     01/01/2021    POTASSIUM 3.9 05/23/2024    POTASSIUM 3.5 07/23/2022    POTASSIUM 3.6 01/01/2021    CHLORIDE 106 05/23/2024    CHLORIDE 109 07/23/2022    CHLORIDE 109 01/01/2021    CO2 23 05/23/2024    CO2 24 07/23/2022    CO2 25 01/01/2021    ANIONGAP 11 05/23/2024    ANIONGAP 5 07/23/2022    ANIONGAP 7 01/01/2021    GLC 98 05/23/2024     (H) 07/23/2022    GLC 84 04/30/2021    BUN 18.4 05/23/2024    BUN 25 07/23/2022    BUN 21 01/01/2021    CR 0.84 05/23/2024    CR 0.76 01/01/2021    GFRESTIMATED 76 05/23/2024    GFRESTIMATED 83 01/01/2021    GFRESTBLACK >90 01/01/2021    JACK 8.8 05/23/2024    JACK 8.9 01/01/2021        A1C RESULTS:  Lab Results   Component Value Date    A1C 5.2 04/30/2021       INR RESULTS:  Lab Results   Component Value Date    INR 1.09 06/16/2005           CC  No referring provider defined for this encounter.

## 2024-05-28 NOTE — LETTER
5/28/2024    Rishi Donovan MD  [id number]  [facility] [address]    RE: Felisha Chintz       Dear Colleague,     I had the pleasure of seeing Felisha Gorman in the [facility].  HPI and Plan:   I the pleasure of seeing Felisha in follow-up please see my original note.  It turns out she is actually stopped her cholesterol pill and her blood pressure pill for the last couple weeks so the numbers are getting here not real.  She thought that perhaps the Bystolic was causing her asthma to be worse she is now seeing a lung specialist.    I chosen Bystolic because she notably had blood pressure issues but she had palpitations and the Bystolic and perhaps bisoprolol or the 2 beta-blockers least likely cause asthma.  Since she is off of it anyways I am to switch her to diltiazem 240 mg a day I did explain to her that does not have the mild anxiolytic properties of the beta-blocker dose it may not stop all palpitations as well I am and increase losartan from 25 once a day to 25 twice daily.  My nurse got a blood pressure 160 systolic I got a blood pressure of 170 systolic when I checked.  I suspect we can have to go higher dose medications.    Her lipid numbers reviewed but she did off her cholesterol pill so organ to recheck them in a month.  We did talk about weight loss and the GLP medications she will talk to her internist about that she just started her CPAP machine for sleep apnea they may help a little bit too.  Today's visit was 34 minutes all counseling we reviewed her actual echocardiogram she does have mild LVH she has no LV outflow tract obstruction reviewed her blood work going back 20 years including lipids electrolytes hemoglobin A1c we will see her back in 1 month have asked her to to see if she can find her old blood pressure machine or by  And check several readings over the course of the next month before she comes back we talked again about metabolic syndrome in depth and diet and  exercise    Orders Placed This Encounter   Procedures    Lipid Profile    Follow-Up with Cardiology EFRAIN     Orders Placed This Encounter   Medications    losartan (COZAAR) 25 MG tablet     Sig: Take 1 tablet (25 mg) by mouth 2 times daily     Dispense:  180 tablet     Refill:  3    propranolol (INDERAL) 10 MG tablet     Sig: Take 1-2 tablets (10-20 mg) by mouth daily as needed (palpitations)     Dispense:  10 tablet     Refill:  2    rosuvastatin (CRESTOR) 10 MG tablet     Sig: Take 1 tablet (10 mg) by mouth daily     Dispense:  90 tablet     Refill:  3    diltiazem ER (DILT-XR) 240 MG 24 hr ER beaded capsule     Sig: Take 1 capsule (240 mg) by mouth every morning     Dispense:  30 capsule     Refill:  11     Medications Discontinued During This Encounter   Medication Reason    propranolol (INDERAL) 10 MG tablet Reorder (No AVS)    losartan (COZAAR) 25 MG tablet Reorder (No AVS)    rosuvastatin (CRESTOR) 10 MG tablet Reorder (No AVS)         Encounter Diagnoses   Name Primary?    Hypertension goal BP (blood pressure) < 140/90     Benign essential hypertension     Palpitations     Hyperlipidemia LDL goal <100        CURRENT MEDICATIONS:  Current Outpatient Medications   Medication Sig Dispense Refill    diltiazem ER (DILT-XR) 240 MG 24 hr ER beaded capsule Take 1 capsule (240 mg) by mouth every morning 30 capsule 11    fluticasone (FLONASE) 50 MCG/ACT nasal spray Spray 1 spray into both nostrils daily 16 g 0    guaiFENesin (MUCINEX) 600 MG 12 hr tablet Take 1 tablet (600 mg) by mouth 2 times daily (Patient taking differently: Take 600 mg by mouth as needed) 30 tablet 0    losartan (COZAAR) 25 MG tablet Take 1 tablet (25 mg) by mouth 2 times daily 180 tablet 3    nebivolol (BYSTOLIC) 5 MG tablet Take 1 tablet (5 mg) by mouth daily (Patient taking differently: Take 2.5 mg by mouth daily Pt is taking half a tablet (2.5 mg)) 90 tablet 2    omeprazole (PRILOSEC) 20 MG DR capsule Take 1 capsule (20 mg) by mouth daily  (Patient taking differently: Take 20 mg by mouth as needed) 90 capsule 2    propranolol (INDERAL) 10 MG tablet Take 1-2 tablets (10-20 mg) by mouth daily as needed (palpitations) 10 tablet 2    rosuvastatin (CRESTOR) 10 MG tablet Take 1 tablet (10 mg) by mouth daily 90 tablet 3    TRELEGY ELLIPTA 100-62.5-25 MCG/ACT oral inhaler INHALE 1 PUFF BY MOUTH AT THE SAME TIME EVERY DAY. RINSE AND SPIT AFTER USE      albuterol (PROAIR HFA/PROVENTIL HFA/VENTOLIN HFA) 108 (90 Base) MCG/ACT inhaler Inhale 2 puffs into the lungs every 6 hours as needed for shortness of breath, wheezing or cough (Patient not taking: Reported on 5/28/2024) 18 g 0    albuterol (VENTOLIN HFA) 108 (90 Base) MCG/ACT inhaler Inhale 2 puffs into the lungs every 6 hours (Patient not taking: Reported on 12/3/2023) 18 g 3    ALPRAZolam (XANAX) 0.5 MG tablet Take 1 tablet (0.5 mg) by mouth 3 times daily as needed for anxiety (Patient not taking: Reported on 12/3/2023) 20 tablet 0    benzonatate (TESSALON) 200 MG capsule Take 1 capsule (200 mg) by mouth 3 times daily as needed for cough (Patient not taking: Reported on 12/3/2023) 30 capsule 0    cetirizine (ZYRTEC) 10 MG tablet Take 10 mg by mouth daily (Patient not taking: Reported on 5/28/2024)      clobetasol (TEMOVATE) 0.05 % external ointment Apply topically 2 times daily . 2 weeks on, 2 weeks off. External genitalia only. (Patient not taking: Reported on 12/3/2023) 60 g 1       ALLERGIES     Allergies   Allergen Reactions    Atorvastatin      upset    Penicillin [Penicillins] Rash       PAST MEDICAL HISTORY:  Past Medical History:   Diagnosis Date    Allergy, unspecified not elsewhere classified     Dysthymic disorder     Essential hypertension, benign     flight anxiety     Frequent BRONCHITIS     usually in winter    HSV (herpes simplex virus) anogenital infection     Impaired fasting glucose 09/23/2007    Glucose 101 9/07    Left anterior hemiblock     Mild intermittent asthma     URI induced     Obstructive sleep apnea (adult) (pediatric)     started cpap 2024    Other and unspecified hyperlipidemia     Palpitations     PVC and PVC bigmeny    Rheumatic fever without mention of heart involvement     no sequelae    transient neurologic sx     ? TIA; had MRI with temporal lobe lesion that is getting smaller       PAST SURGICAL HISTORY:  Past Surgical History:   Procedure Laterality Date    COLONOSCOPY  2009    hyperplastic polyp; repeat 10 years    COLONOSCOPY  2020    repeat 5 years; tubular adenoma    SURGICAL HISTORY OF -   2006    endometrial ablation    SURGICAL HISTORY OF -   15    bilateral breast reduction and tummy tuck    TONSILLECTOMY      Z NONSPECIFIC PROCEDURE  2002    nasal polyp removed       FAMILY HISTORY:  Family History   Problem Relation Age of Onset    C.A.ARLYN. Brother         age 50    Heart Disease Brother     C.AAIMEE Mother     Heart Disease Mother     Cancer Mother         ovarian [place] now    Cancer - colorectal Mother         cancerous polyp    Dementia Mother     C.A.JAZMINE Father         MI    Heart Disease Father        SOCIAL HISTORY:  Social History     Socioeconomic History    Marital status:      Spouse name: Dorian    Number of children: 1    Years of education: None    Highest education level: None   Occupational History    Occupation:       Employer: [employer]   Tobacco Use    Smoking status: Former     Current packs/day: 0.00     Average packs/day: 2.0 packs/day for 10.0 years (20.0 ttl pk-yrs)     Types: Cigarettes     Start date: 1976     Quit date: 1986     Years since quittin.4    Smokeless tobacco: Never   Vaping Use    Vaping status: Never Used   Substance and Sexual Activity    Alcohol use: Not Currently     Alcohol/week: 0.0 - 0.8 standard drinks of alcohol    Drug use: No    Sexual activity: Yes     Partners: Male     Birth control/protection: Condom   Other Topics Concern    Special Diet  "No     Comment: stress eating recently    Exercise No    Parent/sibling w/ CABG, MI or angioplasty before 65F 55M? Yes       Review of Systems:  Skin:  not assessed     Eyes:  not assessed    ENT:  not assessed    Respiratory:    shortness of breath;dyspnea on exertion;cough;sleep apnea;CPAP  Cardiovascular:    palpitations;edema;fatigue;lightheadedness;dizziness  Gastroenterology: not assessed    Genitourinary:  not assessed    Musculoskeletal:  not assessed    Neurologic:  not assessed    Psychiatric:  not assessed    Heme/Lymph/Imm:  not assessed    Endocrine:  not assessed      Physical Exam:  Vitals: BP (!) 160/76 (BP Location: Right arm, Cuff Size: Adult Large)   Pulse 66   Ht 1.753 m (5' 9\")   Wt 97 kg (213 lb 14.4 oz)   LMP 09/29/2006   SpO2 99%   BMI 31.59 kg/m   Orthostatic Vitals from 05/26/24 1138 to 05/28/24 1138    Date and Time Orthostatic BP Orthostatic Pulse Patient Position BP   Location Cuff Size   05/28/24 1029 -- -- -- Right arm Adult Large         Constitutional:           Skin:             Head:           Eyes:           Lymph:      ENT:           Neck:           Respiratory:            Cardiac:                                                           GI:           Extremities and Muscular Skeletal:                 Neurological:           Psych:         Recent Lab Results:  LIPID RESULTS:  Lab Results   Component Value Date    CHOL 173 05/23/2024    CHOL 220 (H) 04/30/2021    HDL 41 (L) 05/23/2024    HDL 45 (L) 04/30/2021     (H) 05/23/2024     (H) 04/30/2021    TRIG 157 (H) 05/23/2024    TRIG 152 (H) 04/30/2021    CHOLHDLRATIO 5.5 (H) 03/25/2014       LIVER ENZYME RESULTS:  Lab Results   Component Value Date    AST 23 02/28/2023    AST 15 01/13/2020    ALT 38 05/23/2024    ALT 9 01/28/2020       CBC RESULTS:  Lab Results   Component Value Date    WBC 12.4 (H) 07/07/2023    WBC 12.1 (H) 01/01/2021    RBC 4.97 07/07/2023    RBC 4.86 01/01/2021    HGB 14.5 07/07/2023    HGB " 14.7 01/01/2021    HCT 44.1 07/07/2023    HCT 45.5 01/01/2021    MCV 89 07/07/2023    MCV 94 01/01/2021    MCH 29.2 07/07/2023    MCH 30.2 01/01/2021    MCHC 32.9 07/07/2023    MCHC 32.3 01/01/2021    RDW 13.3 07/07/2023    RDW 12.9 01/01/2021     07/07/2023     01/01/2021       BMP RESULTS:  Lab Results   Component Value Date     05/23/2024     01/01/2021    POTASSIUM 3.9 05/23/2024    POTASSIUM 3.5 07/23/2022    POTASSIUM 3.6 01/01/2021    CHLORIDE 106 05/23/2024    CHLORIDE 109 07/23/2022    CHLORIDE 109 01/01/2021    CO2 23 05/23/2024    CO2 24 07/23/2022    CO2 25 01/01/2021    ANIONGAP 11 05/23/2024    ANIONGAP 5 07/23/2022    ANIONGAP 7 01/01/2021    GLC 98 05/23/2024     (H) 07/23/2022    GLC 84 04/30/2021    BUN 18.4 05/23/2024    BUN 25 07/23/2022    BUN 21 01/01/2021    CR 0.84 05/23/2024    CR 0.76 01/01/2021    GFRESTIMATED 76 05/23/2024    GFRESTIMATED 83 01/01/2021    GFRESTBLACK >90 01/01/2021    JACK 8.8 05/23/2024    JACK 8.9 01/01/2021        A1C RESULTS:  Lab Results   Component Value Date    A1C 5.2 04/30/2021       INR RESULTS:  Lab Results   Component Value Date    INR 1.09 06/16/2005     CC  No referring provider defined for this encounter.      Thank you for allowing me to participate in the care of your patient.      Sincerely,     Daniel Griffith MD     [facility]

## 2024-05-29 DIAGNOSIS — I10 HYPERTENSION GOAL BP (BLOOD PRESSURE) < 140/90: ICD-10-CM

## 2024-05-29 RX ORDER — DILTIAZEM HYDROCHLORIDE 240 MG/1
240 CAPSULE, EXTENDED RELEASE ORAL EVERY MORNING
Qty: 90 CAPSULE | Refills: 4 | Status: SHIPPED | OUTPATIENT
Start: 2024-05-29

## 2024-06-05 ENCOUNTER — TRANSFERRED RECORDS (OUTPATIENT)
Dept: HEALTH INFORMATION MANAGEMENT | Facility: CLINIC | Age: 64
End: 2024-06-05
Payer: COMMERCIAL

## 2024-06-28 ENCOUNTER — LAB (OUTPATIENT)
Dept: LAB | Facility: CLINIC | Age: 64
End: 2024-06-28
Payer: COMMERCIAL

## 2024-06-28 DIAGNOSIS — I10 HYPERTENSION GOAL BP (BLOOD PRESSURE) < 140/90: ICD-10-CM

## 2024-06-28 DIAGNOSIS — R00.2 PALPITATIONS: ICD-10-CM

## 2024-06-28 DIAGNOSIS — E78.5 HYPERLIPIDEMIA LDL GOAL <100: ICD-10-CM

## 2024-06-28 LAB
CHOLEST SERPL-MCNC: 112 MG/DL
FASTING STATUS PATIENT QL REPORTED: YES
HDLC SERPL-MCNC: 43 MG/DL
LDLC SERPL CALC-MCNC: 39 MG/DL
NONHDLC SERPL-MCNC: 69 MG/DL
TRIGL SERPL-MCNC: 149 MG/DL

## 2024-06-28 PROCEDURE — 36415 COLL VENOUS BLD VENIPUNCTURE: CPT | Performed by: INTERNAL MEDICINE

## 2024-06-28 PROCEDURE — 80061 LIPID PANEL: CPT | Performed by: INTERNAL MEDICINE

## 2024-07-10 ENCOUNTER — TELEPHONE (OUTPATIENT)
Dept: CARDIOLOGY | Facility: CLINIC | Age: 64
End: 2024-07-10

## 2024-07-10 ENCOUNTER — HOSPITAL ENCOUNTER (EMERGENCY)
Facility: CLINIC | Age: 64
Discharge: HOME | End: 2024-07-10
Attending: EMERGENCY MEDICINE | Admitting: EMERGENCY MEDICINE
Payer: COMMERCIAL

## 2024-07-10 ENCOUNTER — PATIENT OUTREACH (OUTPATIENT)
Dept: FAMILY MEDICINE | Facility: CLINIC | Age: 64
End: 2024-07-10

## 2024-07-10 VITALS
OXYGEN SATURATION: 96 % | HEART RATE: 73 BPM | TEMPERATURE: 98.2 F | RESPIRATION RATE: 17 BRPM | SYSTOLIC BLOOD PRESSURE: 124 MMHG | DIASTOLIC BLOOD PRESSURE: 60 MMHG

## 2024-07-10 DIAGNOSIS — I48.91 ATRIAL FIBRILLATION WITH RAPID VENTRICULAR RESPONSE (H): ICD-10-CM

## 2024-07-10 LAB
ALBUMIN SERPL BCG-MCNC: 4.4 G/DL (ref 3.5–5.2)
ALP SERPL-CCNC: 106 U/L (ref 40–150)
ALT SERPL W P-5'-P-CCNC: 36 U/L (ref 0–50)
ANION GAP SERPL CALCULATED.3IONS-SCNC: 15 MMOL/L (ref 7–15)
AST SERPL W P-5'-P-CCNC: 20 U/L (ref 0–45)
ATRIAL RATE - MUSE: 86 BPM
ATRIAL RATE - MUSE: NORMAL BPM
ATRIAL RATE - MUSE: NORMAL BPM
BASOPHILS # BLD AUTO: 0.1 10E3/UL (ref 0–0.2)
BASOPHILS NFR BLD AUTO: 1 %
BILIRUB SERPL-MCNC: 0.4 MG/DL
BUN SERPL-MCNC: 23.1 MG/DL (ref 8–23)
CALCIUM SERPL-MCNC: 9.4 MG/DL (ref 8.8–10.2)
CHLORIDE SERPL-SCNC: 104 MMOL/L (ref 98–107)
CREAT SERPL-MCNC: 0.83 MG/DL (ref 0.51–0.95)
DEPRECATED HCO3 PLAS-SCNC: 21 MMOL/L (ref 22–29)
DIASTOLIC BLOOD PRESSURE - MUSE: NORMAL MMHG
EGFRCR SERPLBLD CKD-EPI 2021: 77 ML/MIN/1.73M2
EOSINOPHIL # BLD AUTO: 0.5 10E3/UL (ref 0–0.7)
EOSINOPHIL NFR BLD AUTO: 4 %
ERYTHROCYTE [DISTWIDTH] IN BLOOD BY AUTOMATED COUNT: 13.2 % (ref 10–15)
GLUCOSE SERPL-MCNC: 103 MG/DL (ref 70–99)
HCT VFR BLD AUTO: 43.1 % (ref 35–47)
HGB BLD-MCNC: 14.4 G/DL (ref 11.7–15.7)
HOLD SPECIMEN: NORMAL
HOLD SPECIMEN: NORMAL
IMM GRANULOCYTES # BLD: 0.1 10E3/UL
IMM GRANULOCYTES NFR BLD: 0 %
INTERPRETATION ECG - MUSE: NORMAL
LYMPHOCYTES # BLD AUTO: 4.5 10E3/UL (ref 0.8–5.3)
LYMPHOCYTES NFR BLD AUTO: 34 %
MAGNESIUM SERPL-MCNC: 2 MG/DL (ref 1.7–2.3)
MCH RBC QN AUTO: 29.4 PG (ref 26.5–33)
MCHC RBC AUTO-ENTMCNC: 33.4 G/DL (ref 31.5–36.5)
MCV RBC AUTO: 88 FL (ref 78–100)
MONOCYTES # BLD AUTO: 1.2 10E3/UL (ref 0–1.3)
MONOCYTES NFR BLD AUTO: 9 %
NEUTROPHILS # BLD AUTO: 7 10E3/UL (ref 1.6–8.3)
NEUTROPHILS NFR BLD AUTO: 53 %
NRBC # BLD AUTO: 0 10E3/UL
NRBC BLD AUTO-RTO: 0 /100
P AXIS - MUSE: 33 DEGREES
P AXIS - MUSE: NORMAL DEGREES
P AXIS - MUSE: NORMAL DEGREES
PLATELET # BLD AUTO: 380 10E3/UL (ref 150–450)
POTASSIUM SERPL-SCNC: 3.8 MMOL/L (ref 3.4–5.3)
PR INTERVAL - MUSE: 176 MS
PR INTERVAL - MUSE: NORMAL MS
PR INTERVAL - MUSE: NORMAL MS
PROT SERPL-MCNC: 7.4 G/DL (ref 6.4–8.3)
QRS DURATION - MUSE: 90 MS
QRS DURATION - MUSE: 94 MS
QRS DURATION - MUSE: 96 MS
QT - MUSE: 380 MS
QT - MUSE: 388 MS
QT - MUSE: 394 MS
QTC - MUSE: 467 MS
QTC - MUSE: 471 MS
QTC - MUSE: 472 MS
R AXIS - MUSE: -39 DEGREES
R AXIS - MUSE: -41 DEGREES
R AXIS - MUSE: -46 DEGREES
RBC # BLD AUTO: 4.89 10E6/UL (ref 3.8–5.2)
SODIUM SERPL-SCNC: 140 MMOL/L (ref 135–145)
SYSTOLIC BLOOD PRESSURE - MUSE: NORMAL MMHG
T AXIS - MUSE: 39 DEGREES
T AXIS - MUSE: 52 DEGREES
T AXIS - MUSE: 62 DEGREES
TROPONIN T SERPL HS-MCNC: <6 NG/L
TSH SERPL DL<=0.005 MIU/L-ACNC: 2.63 UIU/ML (ref 0.3–4.2)
VENTRICULAR RATE- MUSE: 86 BPM
VENTRICULAR RATE- MUSE: 89 BPM
VENTRICULAR RATE- MUSE: 91 BPM
WBC # BLD AUTO: 13.3 10E3/UL (ref 4–11)

## 2024-07-10 PROCEDURE — 96374 THER/PROPH/DIAG INJ IV PUSH: CPT | Mod: 59

## 2024-07-10 PROCEDURE — 99152 MOD SED SAME PHYS/QHP 5/>YRS: CPT

## 2024-07-10 PROCEDURE — 85004 AUTOMATED DIFF WBC COUNT: CPT | Performed by: EMERGENCY MEDICINE

## 2024-07-10 PROCEDURE — 84484 ASSAY OF TROPONIN QUANT: CPT | Performed by: EMERGENCY MEDICINE

## 2024-07-10 PROCEDURE — 92960 CARDIOVERSION ELECTRIC EXT: CPT

## 2024-07-10 PROCEDURE — 83735 ASSAY OF MAGNESIUM: CPT | Performed by: EMERGENCY MEDICINE

## 2024-07-10 PROCEDURE — 99291 CRITICAL CARE FIRST HOUR: CPT | Mod: 25

## 2024-07-10 PROCEDURE — 258N000003 HC RX IP 258 OP 636: Performed by: EMERGENCY MEDICINE

## 2024-07-10 PROCEDURE — 93005 ELECTROCARDIOGRAM TRACING: CPT | Mod: XU

## 2024-07-10 PROCEDURE — 250N000011 HC RX IP 250 OP 636: Performed by: EMERGENCY MEDICINE

## 2024-07-10 PROCEDURE — 82040 ASSAY OF SERUM ALBUMIN: CPT | Performed by: EMERGENCY MEDICINE

## 2024-07-10 PROCEDURE — 84443 ASSAY THYROID STIM HORMONE: CPT | Performed by: EMERGENCY MEDICINE

## 2024-07-10 PROCEDURE — 999N000157 HC STATISTIC RCP TIME EA 10 MIN

## 2024-07-10 PROCEDURE — 272N000240 HC CARDIOVERT/DEFIB/PACER SUPP

## 2024-07-10 PROCEDURE — 93005 ELECTROCARDIOGRAM TRACING: CPT

## 2024-07-10 PROCEDURE — 36415 COLL VENOUS BLD VENIPUNCTURE: CPT | Performed by: EMERGENCY MEDICINE

## 2024-07-10 PROCEDURE — 96375 TX/PRO/DX INJ NEW DRUG ADDON: CPT

## 2024-07-10 PROCEDURE — 96361 HYDRATE IV INFUSION ADD-ON: CPT

## 2024-07-10 RX ORDER — APIXABAN 5 MG (74)
KIT ORAL
Qty: 74 EACH | Refills: 0 | Status: SHIPPED | OUTPATIENT
Start: 2024-07-10 | End: 2024-07-11 | Stop reason: DRUGHIGH

## 2024-07-10 RX ORDER — PROPOFOL 10 MG/ML
200 INJECTION, EMULSION INTRAVENOUS ONCE
Status: COMPLETED | OUTPATIENT
Start: 2024-07-10 | End: 2024-07-10

## 2024-07-10 RX ORDER — DILTIAZEM HYDROCHLORIDE 5 MG/ML
20 INJECTION INTRAVENOUS ONCE
Status: COMPLETED | OUTPATIENT
Start: 2024-07-10 | End: 2024-07-10

## 2024-07-10 RX ADMIN — SODIUM CHLORIDE 1000 ML: 9 INJECTION, SOLUTION INTRAVENOUS at 05:34

## 2024-07-10 RX ADMIN — PROPOFOL 40 MG: 10 INJECTION, EMULSION INTRAVENOUS at 07:26

## 2024-07-10 RX ADMIN — PROPOFOL 40 MG: 10 INJECTION, EMULSION INTRAVENOUS at 07:28

## 2024-07-10 RX ADMIN — DILTIAZEM HYDROCHLORIDE 20 MG: 5 INJECTION, SOLUTION INTRAVENOUS at 05:35

## 2024-07-10 ASSESSMENT — ACTIVITIES OF DAILY LIVING (ADL)
ADLS_ACUITY_SCORE: 35

## 2024-07-10 NOTE — TELEPHONE ENCOUNTER
[facility] Call Center    Phone Message    May a detailed message be left on voicemail: yes     Reason for Call: Other: Patient wanting to let care team know they went to the ER earlier today and had a cardioversion done. Patient stated they were prescribed some medications and will like to make sure it is ok for them to take. Please call patient back to further discuss.     Action Taken: Other: Cardiology    Travel Screening: Not Applicable     Thank you!  [facility]

## 2024-07-10 NOTE — ED NOTES
"Patient arrives complaining of \"my heart is beating funny.\" Started about 30 mins prior to arrival. States she didn't take her medications appropriately tonight, wasn't sleeping well.   On pulse oximetry 90-150s heart rate noted, irregular.   During triage pt stated, \"I'm going to pass out.\" \"I am really dizzy.\" Elevated heart rate noted at this time as well as increased anxiety. Called charge RN for room.   "

## 2024-07-10 NOTE — ED PROVIDER NOTES
Emergency Department Note      History of Present Illness     Chief Complaint   Palpitations    HPI   Felisha Gorman is a 64 year old female with a history of hypertension, hyperlipidemia, and palpitations who presents to the emergency department for evaluation of palpitations. The patient reports waking up this morning at 0500 after not sleeping well and noticing an irregular heart rate. She notes taking her blood pressure and channel blocker medication late yesterday. She states that she has not taken her medication this morning. She denies any fever or chest pain. She denies any history of thyroid issues or abnormal usage of stimulants.  No history of atrial fibrillation.    Independent Historian   None    Review of External Notes   I reviewed the patient's care everywhere and updated epic.    Past Medical History     Medical History and Problem List   Past Medical History:   Diagnosis Date    Dysthymic disorder     Essential hypertension, benign     HSV (herpes simplex virus) anogenital infection     Hyperlipidemia     Hypertrophic cardiomyopathy     Mild intermittent asthma     Obstructive sleep apnea     Palpitations - PVC and PVC bigmeny     Rheumatic fever, history of        Medications   Apixaban Starter Pack (ELIQUIS DVT/PE STARTER PACK) 5 MG TBPK  albuterol (PROAIR HFA/PROVENTIL HFA/VENTOLIN HFA) 108 (90 Base) MCG/ACT inhaler  ALPRAZolam (XANAX) 0.5 MG tablet  cetirizine (ZYRTEC) 10 MG tablet  clobetasol (TEMOVATE) 0.05 % external ointment  diltiazem ER (DILT-XR) 240 MG 24 hr ER beaded capsule  fluticasone (FLONASE) 50 MCG/ACT nasal spray  losartan (COZAAR) 25 MG tablet  nebivolol (BYSTOLIC) 5 MG tablet  omeprazole (PRILOSEC) 20 MG DR capsule  propranolol (INDERAL) 10 MG tablet  rosuvastatin (CRESTOR) 10 MG tablet  TRELEGY ELLIPTA 100-62.5-25 MCG/ACT oral inhaler        Surgical History   Past Surgical History:   Procedure Laterality Date    ABDOMINOPLASTY  2017    COLONOSCOPY  07/2009    hyperplastic  polyp; repeat 10 years    COLONOSCOPY  12/2020    repeat 5 years; tubular adenoma    ENDOMETRIAL ABLATION  2006    ENDOSCOPIC POLYPECTOMY NASAL  2002    MAMMOPLASTY REDUCTION Bilateral 2017    TONSILLECTOMY         Physical Exam     Patient Vitals for the past 24 hrs:   BP Temp Temp src Pulse Resp SpO2   07/10/24 0800 -- -- -- -- -- 96 %   07/10/24 0755 -- -- -- -- -- 98 %   07/10/24 0750 -- -- -- -- -- 94 %   07/10/24 0744 -- -- -- 75 24 94 %   07/10/24 0740 -- -- -- 73 12 97 %   07/10/24 0735 123/59 -- -- 85 14 93 %   07/10/24 0733  145/74 -- -- -- -- 93 %   07/10/24 0732 -- -- -- -- -- 92 %   07/10/24 0731 -- -- -- -- -- 92 %   07/10/24 0730 -- -- -- 78 17 93 %   07/10/24 0730 -- -- -- 85 -- --   07/10/24 0729 140/65 -- -- -- -- --   07/10/24 0729 -- -- -- 100  8 --   07/10/24 0728 -- -- -- 100 10 98 %   07/10/24 0727 -- -- -- 114 21 --   07/10/24 0726 -- -- --  130 12 99 %   07/10/24 0725  177/101 -- -- 110 10 --   07/10/24 0719  -- -- 95 16 94 %   07/10/24 0700  157/99 -- -- 112 20 97 %   07/10/24 0645  139/103 -- -- 100 15 98 %   07/10/24 0521  155/82 -- -- 101  9 98 %   07/10/24 0511  185/100 98.2  F (36.8  C) Temporal  145 20 100 %     Physical Exam  Nursing note and vitals reviewed.  Constitutional: Cooperative.   HENT:   Mouth/Throat: Mucous membranes are normal.   Cardiovascular: tachycardia. Irregularly irregular rhythm.  No murmur.  Pulmonary/Chest: Effort normal and breath sounds normal. No respiratory distress. No wheezes. No rales.   Abdominal: Soft. Normal appearance nontender.  Neurological: Alert.  Oriented x 3  Skin: Skin is warm and dry.   Psychiatric: Normal mood and affect.     Diagnostics     Lab Results   Labs Ordered and Resulted from Time of ED Arrival to Time of ED Departure   COMPREHENSIVE METABOLIC PANEL - Abnormal       Result Value    Sodium 140      Potassium 3.8      Carbon Dioxide (CO2) 21 (*)     Anion Gap 15      Urea Nitrogen 23.1 (*)     Creatinine 0.83      GFR Estimate 77       Calcium 9.4      Chloride 104      Glucose 103 (*)     Alkaline Phosphatase 106      AST 20      ALT 36      Protein Total 7.4      Albumin 4.4      Bilirubin Total 0.4     CBC WITH PLATELETS AND DIFFERENTIAL - Abnormal    WBC Count 13.3 (*)     RBC Count 4.89      Hemoglobin 14.4      Hematocrit 43.1      MCV 88      MCH 29.4      MCHC 33.4      RDW 13.2      Platelet Count 380      % Neutrophils 53      % Lymphocytes 34      % Monocytes 9      % Eosinophils 4      % Basophils 1      % Immature Granulocytes 0      NRBCs per 100 WBC 0      Absolute Neutrophils 7.0      Absolute Lymphocytes 4.5      Absolute Monocytes 1.2      Absolute Eosinophils 0.5      Absolute Basophils 0.1      Absolute Immature Granulocytes 0.1      Absolute NRBCs 0.0     MAGNESIUM - Normal    Magnesium 2.0     TROPONIN T, HIGH SENSITIVITY - Normal    Troponin T, High Sensitivity <6     TSH WITH FREE T4 REFLEX - Normal    TSH 2.63       Imaging   No orders to display     EKG #1  ECG taken at 0515, ECG read at 0520  Atrial fibrillation   Left axis deviation   Nonspecific ST and T wave abnormality    Rate 91 bpm. SD interval * ms. QRS duration 90 ms. QT/QTc 380/467 ms. P-R-T axes * -39 52.    EKG #2  EKG taken at 0619.  EKG read at 0620.  EKG shows atrial fibrillation with a ventricular rate of 89 beats a minute.  Slight leftward axis again noted.  Nonspecific T wave flattening.    EKG #3  EKG taken at 0731, EKG read at 0732  EKG shows normal sinus rhythm with a ventricular rate of 86 beats a minute.  Left anterior fascicular block with left axis deviation.  No ectopy.  No ischemic changes.    Independent Interpretation   None    ED Course      Medications Administered   Medications   sodium chloride 0.9% BOLUS 1,000 mL (0 mLs Intravenous Stopped 7/10/24 5325)   diltiazem (CARDIZEM) injection 20 mg (20 mg Intravenous $Given 7/10/24 5233)   propofol (DIPRIVAN) injection 10 mg/mL vial (40 mg Intravenous $Given 7/10/24 0375)       Procedures    Procedures     Sedation     Procedure: Procedural Sedation    Sedation Level: Moderate    Indication: Cardioversion    Consent: Written from Patient     Universal protocol: Universal protocol was followed and time out conducted just prior to starting procedure, confirming patient identity, site/side, procedure, patient position, and availability of correct equipment     Last PO Intake: Emergent procedure    ASA Class: Class 2 - A patient with mild systemic disease     Exam:  Mallampati:  Grade 2 - Soft palate and major part of uvula visible   Lungs: Clear   Heart: Irregularly irregular rhythm.  Normal rate    Medication: Propofol  Dose: 80 mg     Monitoring:  Monitoring consisted of heart rate, cardiac, continuous pulse oximetry, frequent blood pressure checks.   There was constant attendance by RN until patient recovered and constant attendance by physician until patient stable.   Intubation and emergency airway equipment available.     Response: Vital signs stable, oxygen saturations greater than 92%       Patient Status: Post procedure patient was alert.     Total Physician Drug Administration / Monitoring Time: 10 minutes.     Patient was monitored during recovery and returned to pre-procedure baseline.       Electrical Cardioversion         Procedure: Electrical Cardioversion        Indication: Atrial Fibrillation     Consent: Written from Patient     Universal Protocol: Universal protocol was followed and time out conducted just prior to starting procedure, confirming patient identity, site/side, procedure, patient position, and availability of correct equipment      Anesthesia/Sedation: See above     Procedure Detail:   Electrodes were placed: Anterior/posterior  Trial #1: Synchronized Cardioversion at 150 Joules   Cardioversion was successful     Patient Status:  The patient tolerated the procedure well: Yes. There were no complications.        Discussion of Management   None    ED Course   ED Course as of  07/10/24 0802   Wed Jul 10, 2024   0515 I obtained history and examined the patient as noted above     0758 I rechecked with the patient.     0615      patient rechecked.  Appears to still be in atrial fibrillation.  Repeat EKG ordered.  Consented for sedation and cardioversion  0720      cardioversion performed successfully as above    Optional/Additional Documentation  None    Medical Decision Making / Diagnosis       SHAWNEE Gorman is a 64 year old female who presents with palpitations.  EKG shows atrial fibrillation initially in RVR.  Rate control was achieved using IV diltiazem.  Basic labs including troponin were reassuring.  No shortness of breath or pleuritic chest pain or concern clinically for pulmonary embolism.  She was cardioverted successfully as per above and is now in normal sinus rhythm.  Chadvas2 score is 3 so we discussed anticoagulation.  We will write for an Eliquis starter pack but she would like to speak with her cardiologist whom she will message later today before initiating which I think is very reasonable.    Disposition   The patient was discharged.     Diagnosis     ICD-10-CM    1. Atrial fibrillation with rapid ventricular response (H)  I48.91          Discharge Medications   New Prescriptions    APIXABAN STARTER PACK (ELIQUIS DVT/PE STARTER PACK) 5 MG TBPK    Take 10 mg by mouth 2 times daily for 7 days, THEN 5 mg 2 times daily for 23 days.     Scribe Disclosure:  I, Vicenta Newell, am serving as a scribe at 5:16 AM on 7/10/2024 to document services personally performed by Adeel Taylor MD based on my observations and the provider's statements to me.        Adeel Taylor MD  07/10/24 0811

## 2024-07-10 NOTE — TELEPHONE ENCOUNTER
Transitions of Care Outreach  Chief Complaint   Patient presents with    Hospital F/U       Most Recent Admission Date: 7/10/2024   Most Recent Admission Diagnosis:      Most Recent Discharge Date: 7/10/2024   Most Recent Discharge Diagnosis: Atrial fibrillation with rapid ventricular response (H) - I48.91     Transitions of Care Assessment         Follow up Plan   Patient is planning on calling her Cardiologist to see if she can be worked into the schedule earlier.   [place] [facility] [place].  Patient is going to  the Apixaban later today, when she stopped by earlier, the pharmacy was closed.  Patient will call if she needs anything else.       Future Appointments   Date Time Provider Department Center   7/30/2024 11:00 AM Mary Rodas MD [facility]   8/23/2024 12:30 PM Cassidy Cortez NP [facility]   10/7/2024  8:00 AM Daniel Griffith MD [facility]       Outpatient Plan as outlined on AVS reviewed with patient.    For any urgent concerns, please contact our 24 hour nurse triage line: [phone] ([phone])       Param Adhikari RN

## 2024-07-10 NOTE — PROGRESS NOTES
Respiratory Therapy Note    An ETCO2 monitor was placed on the pt with 3 LPM bled in. The Ambu bag, suction, and airways were setup and present in the room, but not needed. Pt was able to maintain airway throughout the procedure with no intervention needed. ETCO2 levels were maintained between 32-35 mmHg.     RT Gaurang  7:30 AM July 10, 2024

## 2024-07-10 NOTE — ED NOTES
Pt discharged with written instructions and prescription for eliquis.  Pt and  verbalize understanding of follow up with cardiology and blood thinner usage.  No further questions at this time.   will drive home as pt has had a sedated procedure today.  Pt was able to ambulate to the ED lobby without issue.

## 2024-07-10 NOTE — ED TRIAGE NOTES
"Pt seen in the ED for symptomatic A-fib     Triage Assessment (Adult)       Row Name 07/10/24 0510          Triage Assessment    Airway WDL WDL        Respiratory WDL    Respiratory WDL X        Skin Circulation/Temperature WDL    Skin Circulation/Temperature WDL WDL        Cardiac WDL    Cardiac WDL X;all  \"feels like beating crazy\"        Peripheral/Neurovascular WDL    Peripheral Neurovascular WDL WDL        Cognitive/Neuro/Behavioral WDL    Cognitive/Neuro/Behavioral WDL X  dizzy all over                     "

## 2024-07-10 NOTE — TELEPHONE ENCOUNTER
Pt in ER, per ER Note: SHAWNEE Gorman is a 64 year old female who presents with palpitations.  EKG shows atrial fibrillation initially in RVR.  Rate control was achieved using IV diltiazem.  Basic labs including troponin were reassuring.  No shortness of breath or pleuritic chest pain or concern clinically for pulmonary embolism.  She was cardioverted successfully as per above and is now in normal sinus rhythm.  Chadvas2 score is 3 so we discussed anticoagulation.  We will write for an Eliquis starter pack but she would like to speak with her cardiologist whom she will message later today before initiating which I think is very reasonable      Pt wants DR Griffith's recommendation on blood thinner. JEFRY Rodgers RN

## 2024-07-11 DIAGNOSIS — I48.91 NEW ONSET A-FIB (H): Primary | ICD-10-CM

## 2024-07-11 NOTE — TELEPHONE ENCOUNTER
Daniel Griffith MD Schwartz, Tammy J, RN  Caller: Unspecified (Yesterday, 11:32 AM)  Since this is first afib we know of will wait before starting stronger rhythm meds  She was supposed to take propranolol prn palpitations - did she do that? (She should already be on low dose bystolic and diltiazem)  Agree with eliquis but 5mg bid  (not 10 then drop to 5)  Let her know afib is unfortunately common with pts with HTN and any lung issue including sleep apnea and asthma  She should get an ecg and ov with anyone within the next 4 weeks and we might want to do a stress echo later on but not now.    Reviewed DR Griffith's recommendations with Pt and med list updated. JEFRY Rodgers RN

## 2024-07-11 NOTE — PROGRESS NOTES
Daniel Griffith MD Schwartz, Tammy J, RN  Caller: Unspecified (Yesterday, 11:32 AM)  Since this is first afib we know of will wait before starting stronger rhythm meds  She was supposed to take propranolol prn palpitations - did she do that? (She should already be on low dose bystolic and diltiazem)  Agree with eliquis but 5mg bid  (not 10 then drop to 5)  Let her know afib is unfortunately common with pts with HTN and any lung issue including sleep apnea and asthma  She should get an ecg and ov with anyone within the next 4 weeks and we might want to do a stress echo later on but not now    Pt informed of these instructions, and medication updated. Pt has follow up office visit 8/23/24. JEFRY Rodgers RN

## 2024-07-30 ENCOUNTER — OFFICE VISIT (OUTPATIENT)
Dept: PEDIATRICS | Facility: CLINIC | Age: 64
End: 2024-07-30
Payer: COMMERCIAL

## 2024-07-30 VITALS
BODY MASS INDEX: 30.84 KG/M2 | RESPIRATION RATE: 18 BRPM | HEIGHT: 69 IN | OXYGEN SATURATION: 98 % | WEIGHT: 208.2 LBS | SYSTOLIC BLOOD PRESSURE: 132 MMHG | DIASTOLIC BLOOD PRESSURE: 62 MMHG | HEART RATE: 66 BPM | TEMPERATURE: 97.2 F

## 2024-07-30 DIAGNOSIS — J45.20 MILD INTERMITTENT ASTHMA WITHOUT COMPLICATION: ICD-10-CM

## 2024-07-30 DIAGNOSIS — Z78.0 POST-MENOPAUSE: ICD-10-CM

## 2024-07-30 DIAGNOSIS — I10 HYPERTENSION GOAL BP (BLOOD PRESSURE) < 140/90: ICD-10-CM

## 2024-07-30 DIAGNOSIS — Z00.00 ENCOUNTER FOR MEDICAL EXAMINATION TO ESTABLISH CARE: Primary | ICD-10-CM

## 2024-07-30 DIAGNOSIS — I47.0 RE-ENTRY VENTRICULAR ARRHYTHMIA (H): ICD-10-CM

## 2024-07-30 DIAGNOSIS — F33.0 MILD EPISODE OF RECURRENT MAJOR DEPRESSIVE DISORDER (H): ICD-10-CM

## 2024-07-30 LAB
ALBUMIN UR-MCNC: NEGATIVE MG/DL
APPEARANCE UR: CLEAR
BACTERIA #/AREA URNS HPF: ABNORMAL /HPF
BILIRUB UR QL STRIP: NEGATIVE
COLOR UR AUTO: YELLOW
GLUCOSE UR STRIP-MCNC: NEGATIVE MG/DL
HGB UR QL STRIP: NEGATIVE
KETONES UR STRIP-MCNC: NEGATIVE MG/DL
LEUKOCYTE ESTERASE UR QL STRIP: ABNORMAL
MUCOUS THREADS #/AREA URNS LPF: PRESENT /LPF
NITRATE UR QL: NEGATIVE
PH UR STRIP: 5.5 [PH] (ref 5–7)
RBC #/AREA URNS AUTO: ABNORMAL /HPF
SP GR UR STRIP: 1.02 (ref 1–1.03)
SQUAMOUS #/AREA URNS AUTO: ABNORMAL /LPF
UROBILINOGEN UR STRIP-ACNC: 0.2 E.U./DL
WBC #/AREA URNS AUTO: ABNORMAL /HPF

## 2024-07-30 PROCEDURE — 81001 URINALYSIS AUTO W/SCOPE: CPT | Performed by: INTERNAL MEDICINE

## 2024-07-30 PROCEDURE — G2211 COMPLEX E/M VISIT ADD ON: HCPCS | Performed by: INTERNAL MEDICINE

## 2024-07-30 PROCEDURE — 99215 OFFICE O/P EST HI 40 MIN: CPT | Performed by: INTERNAL MEDICINE

## 2024-07-30 PROCEDURE — 82043 UR ALBUMIN QUANTITATIVE: CPT | Performed by: INTERNAL MEDICINE

## 2024-07-30 PROCEDURE — 82570 ASSAY OF URINE CREATININE: CPT | Performed by: INTERNAL MEDICINE

## 2024-07-30 RX ORDER — FLUOXETINE 10 MG/1
10 CAPSULE ORAL DAILY
Qty: 90 CAPSULE | Refills: 1 | Status: SHIPPED | OUTPATIENT
Start: 2024-07-30

## 2024-07-30 ASSESSMENT — PATIENT HEALTH QUESTIONNAIRE - PHQ9
10. IF YOU CHECKED OFF ANY PROBLEMS, HOW DIFFICULT HAVE THESE PROBLEMS MADE IT FOR YOU TO DO YOUR WORK, TAKE CARE OF THINGS AT HOME, OR GET ALONG WITH OTHER PEOPLE: SOMEWHAT DIFFICULT
SUM OF ALL RESPONSES TO PHQ QUESTIONS 1-9: 12
SUM OF ALL RESPONSES TO PHQ QUESTIONS 1-9: 12

## 2024-07-30 ASSESSMENT — ASTHMA QUESTIONNAIRES
QUESTION_4 LAST FOUR WEEKS HOW OFTEN HAVE YOU USED YOUR RESCUE INHALER OR NEBULIZER MEDICATION (SUCH AS ALBUTEROL): ONCE A WEEK OR LESS
QUESTION_3 LAST FOUR WEEKS HOW OFTEN DID YOUR ASTHMA SYMPTOMS (WHEEZING, COUGHING, SHORTNESS OF BREATH, CHEST TIGHTNESS OR PAIN) WAKE YOU UP AT NIGHT OR EARLIER THAN USUAL IN THE MORNING: NOT AT ALL
ACT_TOTALSCORE: 21
QUESTION_2 LAST FOUR WEEKS HOW OFTEN HAVE YOU HAD SHORTNESS OF BREATH: ONCE OR TWICE A WEEK
QUESTION_5 LAST FOUR WEEKS HOW WOULD YOU RATE YOUR ASTHMA CONTROL: WELL CONTROLLED
EMERGENCY_ROOM_LAST_YEAR_TOTAL: ONE
ACT_TOTALSCORE: 21
QUESTION_1 LAST FOUR WEEKS HOW MUCH OF THE TIME DID YOUR ASTHMA KEEP YOU FROM GETTING AS MUCH DONE AT WORK, SCHOOL OR AT HOME: A LITTLE OF THE TIME

## 2024-07-30 ASSESSMENT — PAIN SCALES - GENERAL: PAINLEVEL: NO PAIN (0)

## 2024-07-30 NOTE — PROGRESS NOTES
"  Assessment & Plan     (Z00.00) Encounter for medical examination to establish care  (primary encounter diagnosis)  Comment: establishing new primary care.       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 Now anticoagulated on eliquis.        (Z78.0) Post-menopause  Comment: has not had dexa yet.   Plan: DX Bone Density            (I10) Hypertension goal BP (blood pressure) < 140/90  Comment: at goal. Is working on weight loss.   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 Had been on fluoxetine in the past, interested in re-starting at low dose. Also interested in therapy.   Plan: FLUoxetine (PROZAC) 10 MG capsule, Adult Mental        Health  Referral            (J45.20) Mild intermittent asthma without complication  Comment: has been seeing pulmonology.  Doing well on trelegy, but exacerbated when takes her calcium channel blocker medication.       The longitudinal plan of care for the diagnosis(es)/condition(s) as documented were addressed during this visit. Due to the added complexity in care, I will continue to support Felisha in the subsequent management and with ongoing continuity of care.       45 minutes spent by me on the date of the encounter doing chart review, history and exam, documentation and further activities per the note    BMI  Estimated body mass index is 30.75 kg/m  as calculated from the following:    Height as of this encounter: 1.753 m (5' 9\").    Weight as of this encounter: 94.4 kg (208 lb 3.2 oz).   Weight management plan: Discussed healthy diet and exercise guidelines          Subjective   Felisha is a 64 year old, presenting for the " following health issues:  Establish Care        7/30/2024    10:31 AM   Additional Questions   Roomed by Hetal   Accompanied by none         7/30/2024    10:31 AM   Patient Reported Additional Medications   Patient reports taking the following new medications none     History of Present Illness       Reason for visit:  Establish new care provider to address multiple health issues    She eats 2-3 servings of fruits and vegetables daily.She consumes 0 sweetened beverage(s) daily.She exercises with enough effort to increase her heart rate 10 to 19 minutes per day.  She exercises with enough effort to increase her heart rate 4 days per week.   She is taking medications regularly.     Felisha is here to establish primary care.  Overall, she is doing well with the following concerns:      After her first covid illness she had syncope x2, 2 weeks after. She saw a cardiologist. She has hypertrophic cardiomyopathy. Started on a beta blocker.  Developed a cough, diagnosed with asthma. Had intermittent asthma as a child with colds. Started trelegy. Stopped the beta blocker due to cough and hallucinations. Then back to the cardiologist, switched to calcium channel blocker. With trelegy alone, felt great.  Now, on both she gets cough and chest tightness when taking the diltiazem.   Two weeks ago, woke up with rapid heart beats that didn't stop. Was in a fib. Started eliquis.   Feels she is taking good care of herself. But doesn't feel good   Digestive issues: Has tried to have two colonoscopies, was sent away for asthma once and once for tachycardia. Has improved symptoms with improved diet, addition of fiber gummies,   Very preoccupied with health, has been depressed. Anhedonia, low energy/motivation. Had been on fluoxetine in the past, it was helpful.                 Objective    /62 (BP Location: Right arm, Patient Position: Sitting, Cuff Size: Adult Large)   Pulse 66   Temp 97.2  F (36.2  C) (Tympanic)   Resp 18   Ht  "1.753 m (5' 9\")   Wt 94.4 kg (208 lb 3.2 oz)   LMP 09/29/2006 (Exact Date)   SpO2 98%   BMI 30.75 kg/m    Body mass index is 30.75 kg/m .  Physical Exam   GENERAL: alert and no distress  EYES: Eyes grossly normal to inspection, PERRL and conjunctivae and sclerae normal  HENT: ear canals and TM's normal, nose and mouth without ulcers or lesions  NECK: no adenopathy, no asymmetry, masses, or scars  RESP: lungs clear to auscultation - no rales, rhonchi or wheezes  CV: regular rate and rhythm, normal S1 S2, no S3 or S4, no murmur, click or rub, no peripheral edema  MS: no gross musculoskeletal defects noted, no edema  SKIN: no suspicious lesions or rashes  NEURO: Normal strength and tone, mentation intact and speech normal  PSYCH: mentation appears normal, affect normal/bright            Signed Electronically by: Mary Rodas MD    "

## 2024-07-30 NOTE — PATIENT INSTRUCTIONS
For constipation, try an apple a day. Or try prunes.  You can also try miralax daily.     Try to increase your daily exercise to 150 min moderately vigorous exercise per week. Also look into weight lifting/muscle building.     You can check with your pulmonologist about changing trelegy for another medication.

## 2024-07-31 LAB
CREAT UR-MCNC: 130 MG/DL
MICROALBUMIN UR-MCNC: <12 MG/L
MICROALBUMIN/CREAT UR: NORMAL MG/G{CREAT}

## 2024-08-04 ENCOUNTER — HEALTH MAINTENANCE LETTER (OUTPATIENT)
Age: 64
End: 2024-08-04

## 2024-08-13 DIAGNOSIS — I48.91 NEW ONSET A-FIB (H): ICD-10-CM

## 2024-08-18 ENCOUNTER — APPOINTMENT (OUTPATIENT)
Dept: GENERAL RADIOLOGY | Facility: CLINIC | Age: 64
End: 2024-08-18
Attending: EMERGENCY MEDICINE
Payer: COMMERCIAL

## 2024-08-18 ENCOUNTER — HOSPITAL ENCOUNTER (EMERGENCY)
Facility: CLINIC | Age: 64
Discharge: HOME | End: 2024-08-18
Attending: EMERGENCY MEDICINE | Admitting: EMERGENCY MEDICINE
Payer: COMMERCIAL

## 2024-08-18 VITALS
WEIGHT: 208.78 LBS | OXYGEN SATURATION: 99 % | DIASTOLIC BLOOD PRESSURE: 72 MMHG | BODY MASS INDEX: 30.92 KG/M2 | SYSTOLIC BLOOD PRESSURE: 141 MMHG | TEMPERATURE: 98.3 F | RESPIRATION RATE: 16 BRPM | HEART RATE: 62 BPM | HEIGHT: 69 IN

## 2024-08-18 DIAGNOSIS — R10.10 PAIN OF UPPER ABDOMEN: ICD-10-CM

## 2024-08-18 DIAGNOSIS — R06.02 SHORTNESS OF BREATH: ICD-10-CM

## 2024-08-18 LAB
ALBUMIN SERPL BCG-MCNC: 4.1 G/DL (ref 3.5–5.2)
ALP SERPL-CCNC: 89 U/L (ref 40–150)
ALT SERPL W P-5'-P-CCNC: 35 U/L (ref 0–50)
ANION GAP SERPL CALCULATED.3IONS-SCNC: 12 MMOL/L (ref 7–15)
AST SERPL W P-5'-P-CCNC: 20 U/L (ref 0–45)
BASOPHILS # BLD AUTO: 0.1 10E3/UL (ref 0–0.2)
BASOPHILS NFR BLD AUTO: 1 %
BILIRUB DIRECT SERPL-MCNC: <0.2 MG/DL (ref 0–0.3)
BILIRUB SERPL-MCNC: 0.5 MG/DL
BUN SERPL-MCNC: 15.2 MG/DL (ref 8–23)
CALCIUM SERPL-MCNC: 8.8 MG/DL (ref 8.8–10.4)
CHLORIDE SERPL-SCNC: 106 MMOL/L (ref 98–107)
CREAT SERPL-MCNC: 0.82 MG/DL (ref 0.51–0.95)
EGFRCR SERPLBLD CKD-EPI 2021: 78 ML/MIN/1.73M2
EOSINOPHIL # BLD AUTO: 0.3 10E3/UL (ref 0–0.7)
EOSINOPHIL NFR BLD AUTO: 4 %
ERYTHROCYTE [DISTWIDTH] IN BLOOD BY AUTOMATED COUNT: 13.2 % (ref 10–15)
GLUCOSE SERPL-MCNC: 117 MG/DL (ref 70–99)
HCO3 SERPL-SCNC: 23 MMOL/L (ref 22–29)
HCT VFR BLD AUTO: 41.8 % (ref 35–47)
HGB BLD-MCNC: 13.6 G/DL (ref 11.7–15.7)
HOLD SPECIMEN: NORMAL
HOLD SPECIMEN: NORMAL
IMM GRANULOCYTES # BLD: 0 10E3/UL
IMM GRANULOCYTES NFR BLD: 0 %
LIPASE SERPL-CCNC: 33 U/L (ref 13–60)
LYMPHOCYTES # BLD AUTO: 2 10E3/UL (ref 0.8–5.3)
LYMPHOCYTES NFR BLD AUTO: 24 %
MCH RBC QN AUTO: 29.3 PG (ref 26.5–33)
MCHC RBC AUTO-ENTMCNC: 32.5 G/DL (ref 31.5–36.5)
MCV RBC AUTO: 90 FL (ref 78–100)
MONOCYTES # BLD AUTO: 0.6 10E3/UL (ref 0–1.3)
MONOCYTES NFR BLD AUTO: 7 %
NEUTROPHILS # BLD AUTO: 5.2 10E3/UL (ref 1.6–8.3)
NEUTROPHILS NFR BLD AUTO: 63 %
NRBC # BLD AUTO: 0 10E3/UL
NRBC BLD AUTO-RTO: 0 /100
PLATELET # BLD AUTO: 344 10E3/UL (ref 150–450)
POTASSIUM SERPL-SCNC: 3.8 MMOL/L (ref 3.4–5.3)
PROT SERPL-MCNC: 6.6 G/DL (ref 6.4–8.3)
RBC # BLD AUTO: 4.64 10E6/UL (ref 3.8–5.2)
SARS-COV-2 RNA RESP QL NAA+PROBE: NEGATIVE
SODIUM SERPL-SCNC: 141 MMOL/L (ref 135–145)
TROPONIN T SERPL HS-MCNC: <6 NG/L
WBC # BLD AUTO: 8.2 10E3/UL (ref 4–11)

## 2024-08-18 PROCEDURE — 36415 COLL VENOUS BLD VENIPUNCTURE: CPT | Performed by: EMERGENCY MEDICINE

## 2024-08-18 PROCEDURE — 80053 COMPREHEN METABOLIC PANEL: CPT | Performed by: EMERGENCY MEDICINE

## 2024-08-18 PROCEDURE — 71046 X-RAY EXAM CHEST 2 VIEWS: CPT

## 2024-08-18 PROCEDURE — 82248 BILIRUBIN DIRECT: CPT | Performed by: EMERGENCY MEDICINE

## 2024-08-18 PROCEDURE — 84484 ASSAY OF TROPONIN QUANT: CPT | Performed by: EMERGENCY MEDICINE

## 2024-08-18 PROCEDURE — 83690 ASSAY OF LIPASE: CPT | Performed by: EMERGENCY MEDICINE

## 2024-08-18 PROCEDURE — 99285 EMERGENCY DEPT VISIT HI MDM: CPT | Mod: 25 | Performed by: EMERGENCY MEDICINE

## 2024-08-18 PROCEDURE — 87635 SARS-COV-2 COVID-19 AMP PRB: CPT | Performed by: EMERGENCY MEDICINE

## 2024-08-18 PROCEDURE — 85025 COMPLETE CBC W/AUTO DIFF WBC: CPT | Performed by: EMERGENCY MEDICINE

## 2024-08-18 PROCEDURE — 93005 ELECTROCARDIOGRAM TRACING: CPT | Performed by: EMERGENCY MEDICINE

## 2024-08-18 PROCEDURE — 250N000013 HC RX MED GY IP 250 OP 250 PS 637: Performed by: EMERGENCY MEDICINE

## 2024-08-18 RX ORDER — MAGNESIUM HYDROXIDE/ALUMINUM HYDROXICE/SIMETHICONE 120; 1200; 1200 MG/30ML; MG/30ML; MG/30ML
15 SUSPENSION ORAL ONCE
Status: COMPLETED | OUTPATIENT
Start: 2024-08-18 | End: 2024-08-18

## 2024-08-18 RX ADMIN — ALUMINUM HYDROXIDE, MAGNESIUM HYDROXIDE, AND SIMETHICONE 15 ML: 1200; 120; 1200 SUSPENSION ORAL at 09:39

## 2024-08-18 ASSESSMENT — COLUMBIA-SUICIDE SEVERITY RATING SCALE - C-SSRS
2. HAVE YOU ACTUALLY HAD ANY THOUGHTS OF KILLING YOURSELF IN THE PAST MONTH?: NO
6. HAVE YOU EVER DONE ANYTHING, STARTED TO DO ANYTHING, OR PREPARED TO DO ANYTHING TO END YOUR LIFE?: NO
1. IN THE PAST MONTH, HAVE YOU WISHED YOU WERE DEAD OR WISHED YOU COULD GO TO SLEEP AND NOT WAKE UP?: NO

## 2024-08-18 ASSESSMENT — ACTIVITIES OF DAILY LIVING (ADL)
ADLS_ACUITY_SCORE: 35
ADLS_ACUITY_SCORE: 35

## 2024-08-18 NOTE — ED PROVIDER NOTES
Emergency Department Note      History of Present Illness     Chief Complaint   Shortness of Breath and Abdominal Pain      HPI   Felisha Gorman is a 64 year old female with a history of atrial fibrillation, hypertension, hyperlipidemia, asthma, and anxiety who presents to the ED with her significant other for evaluation of shortness of breath and abdominal pain. The patient states she woke up this morning with abdominal heaviness and discomfort with shortness of breath and generalized body tingling. She feels the abdominal discomfort is not consistent with past episodes of acid reflux and currently rates it a 2/10 in severity. She did have fattier foods last night for dinner but is otherwise eating and drinking normally. Notes a slight cough, congestion, rhinorrhea, headache, and dizziness for the past couple of days. Reports she recently switched from a Trelegy inhaler to Spiriva. States she has chronic constipation that she takes a fiber supplement for. Her last bowel movement was yesterday and it was normal. She does have a history of anxiety that she feels is always present. Denies fever, sore throat, vomiting, diarrhea, nausea, palpitations, dysuria, hematuria, lower extremity edema, black or bloody stool, back pain, chest pain, or recent alcohol use. Denies history of DVT/PE or abdominal surgeries. No known sick exposures.    Independent Historian   None    Review of External Notes   I reviewed the ED note from 7/10/24 for atrial fibrillation. She was converted with a cardioversion.    Past Medical History     Medical History and Problem List   Dysthymic disorder  Asthma  HTN  ELO  Anxiety  Depression  OA, knee  Obesity  Re-entry ventricular arrhythmia   Hypertrophic cardiomyopathy     Medications   Spiriva  Albuterol  Xanax  Eliquis  Diltiazem  Fluoxetine  Fluticasone  Losartan  Nebivolol  Omeprazole  Propranolol  Crestor     Surgical History   Abdominoplasty  Endometrial ablation  Nasal  "polypectomy  Mammoplasty reduction (B)  Tonsillectomy     Physical Exam     Patient Vitals for the past 24 hrs:   BP Temp Temp src Pulse Resp SpO2 Height Weight   08/18/24 1139 (!) 141/72 -- -- 62 16 99 % -- --   08/18/24 0919 (!) 147/63 98.3  F (36.8  C) Temporal 65 18 100 % 1.753 m (5' 9\") 94.7 kg (208 lb 12.4 oz)     Physical Exam  GENERAL: Awake, alert  CARDIOVASCULAR: Regular rate and rhythm  LUNGS: Clear bilaterally, no wheezes rales or rhonchi  ABDOMEN: Soft, nontender, no rebound or guarding  EXTREMITIES: No peripheral edema  NEURO: Awake and alert, moves all extremities    Diagnostics     Lab Results   Labs Ordered and Resulted from Time of ED Arrival to Time of ED Departure   BASIC METABOLIC PANEL - Abnormal       Result Value    Sodium 141      Potassium 3.8      Chloride 106      Carbon Dioxide (CO2) 23      Anion Gap 12      Urea Nitrogen 15.2      Creatinine 0.82      GFR Estimate 78      Calcium 8.8      Glucose 117 (*)    TROPONIN T, HIGH SENSITIVITY - Normal    Troponin T, High Sensitivity <6     HEPATIC FUNCTION PANEL - Normal    Protein Total 6.6      Albumin 4.1      Bilirubin Total 0.5      Alkaline Phosphatase 89      AST 20      ALT 35      Bilirubin Direct <0.20     LIPASE - Normal    Lipase 33     COVID-19 VIRUS (CORONAVIRUS) BY PCR - Normal    SARS CoV2 PCR Negative     CBC WITH PLATELETS AND DIFFERENTIAL    WBC Count 8.2      RBC Count 4.64      Hemoglobin 13.6      Hematocrit 41.8      MCV 90      MCH 29.3      MCHC 32.5      RDW 13.2      Platelet Count 344      % Neutrophils 63      % Lymphocytes 24      % Monocytes 7      % Eosinophils 4      % Basophils 1      % Immature Granulocytes 0      NRBCs per 100 WBC 0      Absolute Neutrophils 5.2      Absolute Lymphocytes 2.0      Absolute Monocytes 0.6      Absolute Eosinophils 0.3      Absolute Basophils 0.1      Absolute Immature Granulocytes 0.0      Absolute NRBCs 0.0         Imaging   Chest XR,  PA & LAT   Final Result   IMPRESSION: " Negative chest.             EKG   ECG taken at 0911, ECG read at 0920  Sinus rhythm  Left axis deviation   No significant change as compared to prior, dated 7/10/24.  Rate 68 bpm. PA interval 176 ms. QRS duration 92 ms. QT/QTc 440/467 ms. P-R-T axes 38 -41 46.    Independent Interpretation   I reviewed the chest x-ray. No infiltrate.    ED Course      Medications Administered   Medications   alum & mag hydroxide-simethicone (MAALOX) suspension 15 mL (15 mLs Oral $Given 8/18/24 0941)       Procedures   Procedures     Discussion of Management   None    ED Course   ED Course as of 08/18/24 1430   Sun Aug 18, 2024   0930 I obtained history and performed a physical exam as noted above.    1133 I rechecked and updated the patient.        Additional Documentation  None    Medical Decision Making / Diagnosis     CMS Diagnoses: None    MIPS       None    MDM   Felisha Gorman is a 64-year-old female who presents emergency department for evaluation of epigastric abdominal pain and some shortness of breath.  Patient had normal vital signs here.  She had clear lungs.  She had no leukocytosis.  LFTs including lipase were within normal limits.  BMP unremarkable.  COVID test was negative, EKG showed no ischemic changes and troponin was negative ruling her out for ACS per protocol.  Chest x-ray showed no infiltrate.  She is feeling slightly better after GI cocktail.  On reexamination her abdomen is benign.  No indication for any imaging at this point.  Unclear what is causing this patient's symptoms, could have been some reflux, gas pains, viral respiratory illness but given negative workup as above I believe she is stable for close monitoring of her symptoms at home and will return if worsening.    Disposition   The patient was discharged.     Diagnosis     ICD-10-CM    1. Shortness of breath  R06.02       2. Pain of upper abdomen  R10.10            Discharge Medications   Discharge Medication List as of 8/18/2024 11:32 AM             Scribe Disclosure:  I, Violetta Talley, am serving as a scribe at 9:38 AM on 8/18/2024 to document services personally performed by Violetta Dubon MD based on my observations and the provider's statements to me.        Violetta Dubon MD  08/18/24 [id number]

## 2024-08-18 NOTE — ED TRIAGE NOTES
"Pt with c/o upper abd \"pressure\" and SOB since 0830 today. Denies radiating pain, n/v/d or dysuria. No CP. ABC intact.         "

## 2024-08-19 LAB
ATRIAL RATE - MUSE: 68 BPM
DIASTOLIC BLOOD PRESSURE - MUSE: NORMAL MMHG
INTERPRETATION ECG - MUSE: NORMAL
P AXIS - MUSE: 38 DEGREES
PR INTERVAL - MUSE: 176 MS
QRS DURATION - MUSE: 92 MS
QT - MUSE: 440 MS
QTC - MUSE: 467 MS
R AXIS - MUSE: -41 DEGREES
SYSTOLIC BLOOD PRESSURE - MUSE: NORMAL MMHG
T AXIS - MUSE: 46 DEGREES
VENTRICULAR RATE- MUSE: 68 BPM

## 2024-08-22 NOTE — PATIENT INSTRUCTIONS
Today's Recommendations    Continue your healthy lifestyle changes   Your cholesterol has significantly improved  I would recommend you establish with a primary care provider  Continue all medications without changes.  Please follow up with Dr. Griffith in 1 year with an echocardiogram and fasting cholesterol panel before.    Please send a Rapid7 message or call [phone] to the RN team with questions or concerns.     Scheduling number [phone]  COLETTE Moseley, CNP      Latest Reference Range & Units 02/01/23 07:33   ALT 10 - 35 U/L 27   Cholesterol <200 mg/dL 132   HDL Cholesterol >=50 mg/dL 42 (L)   LDL Cholesterol Calculated <=100 mg/dL 56   Non HDL Cholesterol <130 mg/dL 90   Triglycerides <150 mg/dL 169 (H)   (L): Data is abnormally low  (H): Data is abnormally high   [Alert] : alert [Well Nourished] : well nourished [Healthy Appearance] : healthy appearance [No Acute Distress] : no acute distress [Well Developed] : well developed [Normal Sclera/Conjunctiva] : normal sclera/conjunctiva [EOMI] : extra ocular movement intact [PERRL] : pupils equal, round and reactive to light [No Proptosis] : no proptosis [Normal Outer Ear/Nose] : the ears and nose were normal in appearance [Normal Hearing] : hearing was normal [Supple] : the neck was supple [Thyroid Not Enlarged] : the thyroid was not enlarged [No Respiratory Distress] : no respiratory distress [No Accessory Muscle Use] : no accessory muscle use [Normal Rate and Effort] : normal respiratory rate and effort [Clear to Auscultation] : lungs were clear to auscultation bilaterally [Normal S1, S2] : normal S1 and S2 [Normal Rate] : heart rate was normal [Regular Rhythm] : with a regular rhythm [Carotids Normal] : carotid pulses were normal with no bruits [No Edema] : no peripheral edema [Pedal Pulses Normal] : the pedal pulses are present [Normal Bowel Sounds] : normal bowel sounds [Not Tender] : non-tender [Not Distended] : not distended [Soft] : abdomen soft [Normal Anterior Cervical Nodes] : no anterior cervical lymphadenopathy [No CVA Tenderness] : no ~M costovertebral angle tenderness [No Spinal Tenderness] : no spinal tenderness [Spine Straight] : spine straight [No Stigmata of Cushings Syndrome] : no stigmata of Cushings Syndrome [Normal Gait] : normal gait [Normal Strength/Tone] : muscle strength and tone were normal [No Rash] : no rash [Cranial Nerves Intact] : cranial nerves 2-12 were intact [Normal Reflexes] : deep tendon reflexes were 2+ and symmetric [No Tremors] : no tremors [Oriented x3] : oriented to person, place, and time [Normal Affect] : the affect was normal [Normal Mood] : the mood was normal [Kyphosis] : no kyphosis present [Scoliosis] : no scoliosis [Acanthosis Nigricans] : no acanthosis nigricans [de-identified] : DM2

## 2024-08-23 ENCOUNTER — OFFICE VISIT (OUTPATIENT)
Dept: CARDIOLOGY | Facility: CLINIC | Age: 64
End: 2024-08-23
Attending: INTERNAL MEDICINE
Payer: COMMERCIAL

## 2024-08-23 VITALS
WEIGHT: 209.2 LBS | HEIGHT: 69 IN | BODY MASS INDEX: 30.98 KG/M2 | SYSTOLIC BLOOD PRESSURE: 130 MMHG | HEART RATE: 70 BPM | DIASTOLIC BLOOD PRESSURE: 76 MMHG

## 2024-08-23 DIAGNOSIS — I10 HYPERTENSION GOAL BP (BLOOD PRESSURE) < 140/90: ICD-10-CM

## 2024-08-23 DIAGNOSIS — I48.91 NEW ONSET A-FIB (H): Primary | ICD-10-CM

## 2024-08-23 DIAGNOSIS — R00.2 PALPITATIONS: ICD-10-CM

## 2024-08-23 DIAGNOSIS — E78.5 HYPERLIPIDEMIA LDL GOAL <100: ICD-10-CM

## 2024-08-23 PROCEDURE — 99214 OFFICE O/P EST MOD 30 MIN: CPT | Performed by: NURSE PRACTITIONER

## 2024-08-23 NOTE — PROGRESS NOTES
Cardiology Clinic Progress Note  Felisha Gorman MRN# [id number]   YOB: 1960 Age: 64 year old   Primary Cardiologist: Dr. Griffith Reason for visit: Follow up atrial fibrillation             Assessment and Plan:     Recent diagnosis of atrial fibrillation, s/p DCCV (7/2024)  -[id number]-INIj score of 3 (age+, HTN+, Female +)  -On diltiazem XR 240mg daily    2. Hypertension, controlled    3. Hyperlipidemia, goal LDL <100  -Lipid panel has significantly improved with addition of rosuvastatin 10 mg daily LDL now 39 (6/2024)    4. Moderate asthma  -11/2023 PFTs showed mild obstruction and moderate restriction     5. Obstructive sleep apnea, compliant with CPAP    Plan:  Continue eliquis 5mg BID for thromboembolic prophylaxis   Stress echocardiogram prior to next clinic visit for ischemic evaluation considering new onset atrial fibrillation  Continue diltiazem and losartan for hypertension  Recommended bringing and using her inhaler when she is exerting herself to see if this helps with her exertional dyspnea    Follow up plan: Patient to follow-up with Dr. Griffith in October as scheduled         History of Presenting Illness:    Felisha Gorman is a very pleasant 63 year old female with a history of hypertension, anxiety, and palpitations.    Her last TTE in 9/2022 showed hyperdynamic LV function with an LVEF greater than 70, normal LV size, mild concentric LVH, no significant valvular abnormalities.  Dr. Griffith reviewed this and questioned possible hypertrophic obstructive cardiomyopathy, partial LV outflow tract obstruction, and possible JESSICA of the mitral valve cords.    Patient was seen by Dr. Griffith most recently in December 2022.  That time I calculated her 10-year ASCVD risk score at 6.5%, improved from 15% in October 2022, secondary to improvement in her hypertension after starting nebivolol.  He started Crestor 10 mg daily.  She previously did not tolerate atorvastatin.    I saw Felisha in February  2024 at which point she was doing well following a starting rosuvastatin.  She then saw Dr. Griffith in May 2024 and her lipid panel showed a slight in her numbers with an LDL of 101  She stopped taking her rosuvastatin at that point.  She also was off her Bystolic and was notably hypertensive.  He started her alternatively on diltiazem.  He also recommended weight loss, possibly with GLP-1 medications.    In July 2024, she presented to the emergency room with palpitations and her EKG revealed she was in rate controlled atrial fibrillation.  They performed cardioversion and discharged her on Eliquis thromboembolic prophylaxis. TSH was normal at 2.63.     Last week she was seen in the ER with abdominal pressure and shortness of breath. She felt like she could not take a deep breath. An EKG was done which did not show any ischemic changes and she was in sinus rhythm.     Patient is here today for follow up of her recent diagnosis of atrial fibrillation. She notes she was quite symptomatic when she presented with her atrial fibrillation.     Patient denies chest pain or chest tightness. Denies dizziness, lightheadedness,  or presyncopal symptoms. Denies tachycardia or palpitations.  Denies shortness of breath, orthopnea, or PND.    She walks 30 minutes a day outside and feels well, however if she goes up a hill she gets short of breath.     Labs from 6/28/2024 total cholesterol 112, HDL 43, LDL 39, triglycerides 149, 8/18/2024 with hemoglobin 13.6, platelets 244, ALT 35, BMP with sodium 141, potassium 3.8, BUN 15.2, creatinine 0.82, GFR of 78..     Blood pressure 160/70 and HR 70 in clinic today. She checks her blood pressure at home and her readings have been typically in the 120 systolic range following medication in the morning, in the evening the lowest was 107 systolic.     She denies bleeding issues with eliquis.         Recent Hospitalizations   ER x 2 in July and August, new afib and shortness of breath            Social History      Social History     Socioeconomic History    Marital status:      Spouse name: Dorian    Number of children: 1    Years of education: Not on file    Highest education level: Not on file   Occupational History    Occupation:       Employer: [employer]   Tobacco Use    Smoking status: Former     Current packs/day: 0.00     Average packs/day: 2.0 packs/day for 10.0 years (20.0 ttl pk-yrs)     Types: Cigarettes     Start date: 1976     Quit date: 1986     Years since quittin.6    Smokeless tobacco: Never   Vaping Use    Vaping status: Never Used   Substance and Sexual Activity    Alcohol use: Not Currently     Alcohol/week: 0.0 - 0.8 standard drinks of alcohol    Drug use: No    Sexual activity: Yes     Partners: Male     Birth control/protection: Condom   Other Topics Concern     Service Not Asked    Blood Transfusions Not Asked    Caffeine Concern Not Asked    Occupational Exposure Not Asked    Hobby Hazards Not Asked    Sleep Concern Not Asked    Stress Concern Not Asked    Weight Concern Not Asked    Special Diet No     Comment: stress eating recently    Back Care Not Asked    Exercise No    Bike Helmet Not Asked    Seat Belt Not Asked    Self-Exams Not Asked    Parent/sibling w/ CABG, MI or angioplasty before 65F 55M? Yes   Social History Narrative    Not on file     Social Determinants of Health     Financial Resource Strain: Not on file   Food Insecurity: Not on file   Transportation Needs: Not on file   Physical Activity: Not on file   Stress: Not on file   Social Connections: Not on file   Interpersonal Safety: Low Risk  (2024)    Interpersonal Safety     Do you feel physically and emotionally safe where you currently live?: Yes     Within the past 12 months, have you been hit, slapped, kicked or otherwise physically hurt by someone?: No     Within the past 12 months, have you been humiliated or emotionally abused in  "other ways by your partner or ex-partner?: No   Housing Stability: Not on file            Review of Systems:   Skin:  not assessed     Eyes:  not assessed    ENT:  not assessed    Respiratory:  Positive for shortness of breath;cough;sleep apnea;CPAP  Cardiovascular:    palpitations;Positive for  Gastroenterology: not assessed    Genitourinary:  not assessed    Musculoskeletal:  not assessed    Neurologic:  not assessed    Psychiatric:  not assessed    Heme/Lymph/Imm:  not assessed    Endocrine:  not assessed           Physical Exam:   Vitals: BP (!) 160/70   Pulse 70   Ht 1.753 m (5' 9\")   Wt 94.9 kg (209 lb 3.2 oz)   LMP 09/29/2006 (Exact Date)   BMI 30.89 kg/m     Wt Readings from Last 4 Encounters:   08/23/24 94.9 kg (209 lb 3.2 oz)   08/18/24 94.7 kg (208 lb 12.4 oz)   07/30/24 94.4 kg (208 lb 3.2 oz)   05/28/24 97 kg (213 lb 14.4 oz)     GEN: well nourished, in no acute distress.  HEENT:  Pupils equal, round. Sclerae nonicteric.   NECK: Supple, no masses appreciated.  No JVD with patient supine.  C/V:  Regular rate and rhythm, no murmur, rub or gallop.    RESP: Respirations are unlabored. Clear to auscultation bilaterally without wheezing, rales, or rhonchi.  GI: Abdomen soft, nontender.  EXTREM: No LE edema.  NEURO: Alert and oriented, cooperative.  SKIN: Warm and dry.        Data:       LIPID RESULTS:  Lab Results   Component Value Date    CHOL 112 06/28/2024    CHOL 220 (H) 04/30/2021    HDL 43 (L) 06/28/2024    HDL 45 (L) 04/30/2021    LDL 39 06/28/2024     (H) 04/30/2021    TRIG 149 06/28/2024    TRIG 152 (H) 04/30/2021    CHOLHDLRATIO 5.5 (H) 03/25/2014     LIVER ENZYME RESULTS:  Lab Results   Component Value Date    AST 20 08/18/2024    AST 15 01/13/2020    ALT 35 08/18/2024    ALT 9 01/28/2020     CBC RESULTS:  Lab Results   Component Value Date    WBC 8.2 08/18/2024    WBC 12.1 (H) 01/01/2021    RBC 4.64 08/18/2024    RBC 4.86 01/01/2021    HGB 13.6 08/18/2024    HGB 14.7 01/01/2021    HCT " 41.8 08/18/2024    HCT 45.5 01/01/2021    MCV 90 08/18/2024    MCV 94 01/01/2021    MCH 29.3 08/18/2024    MCH 30.2 01/01/2021    MCHC 32.5 08/18/2024    MCHC 32.3 01/01/2021    RDW 13.2 08/18/2024    RDW 12.9 01/01/2021     08/18/2024     01/01/2021     BMP RESULTS:  Lab Results   Component Value Date     08/18/2024     01/01/2021    POTASSIUM 3.8 08/18/2024    POTASSIUM 3.5 07/23/2022    POTASSIUM 3.6 01/01/2021    CHLORIDE 106 08/18/2024    CHLORIDE 109 07/23/2022    CHLORIDE 109 01/01/2021    CO2 23 08/18/2024    CO2 24 07/23/2022    CO2 25 01/01/2021    ANIONGAP 12 08/18/2024    ANIONGAP 5 07/23/2022    ANIONGAP 7 01/01/2021     (H) 08/18/2024     (H) 07/23/2022    GLC 84 04/30/2021    BUN 15.2 08/18/2024    BUN 25 07/23/2022    BUN 21 01/01/2021    CR 0.82 08/18/2024    CR 0.76 01/01/2021    GFRESTIMATED 78 08/18/2024    GFRESTIMATED 83 01/01/2021    GFRESTBLACK >90 01/01/2021    JACK 8.8 08/18/2024    JACK 8.9 01/01/2021      A1C RESULTS:  Lab Results   Component Value Date    A1C 5.2 04/30/2021     INR RESULTS:  Lab Results   Component Value Date    INR 1.09 06/16/2005            Medications     Current Outpatient Medications   Medication Sig Dispense Refill    albuterol (PROAIR HFA/PROVENTIL HFA/VENTOLIN HFA) 108 (90 Base) MCG/ACT inhaler Inhale 2 puffs into the lungs every 6 hours as needed for shortness of breath, wheezing or cough 18 g 0    ALPRAZolam (XANAX) 0.5 MG tablet Take 1 tablet (0.5 mg) by mouth 3 times daily as needed for anxiety 20 tablet 0    apixaban ANTICOAGULANT (ELIQUIS) 5 MG tablet Take 1 tablet (5 mg) by mouth 2 times daily 60 tablet 2    cetirizine (ZYRTEC) 10 MG tablet Take 10 mg by mouth as needed.      diltiazem ER (DILT-XR) 240 MG 24 hr ER beaded capsule Take 1 capsule (240 mg) by mouth every morning 90 capsule 4    FLUoxetine (PROZAC) 10 MG capsule Take 1 capsule (10 mg) by mouth daily (Patient taking differently: Take 10 mg by mouth as  needed.) 90 capsule 1    losartan (COZAAR) 25 MG tablet Take 1 tablet (25 mg) by mouth 2 times daily 180 tablet 3    omeprazole (PRILOSEC) 20 MG DR capsule Take 1 capsule (20 mg) by mouth daily (Patient taking differently: Take 20 mg by mouth as needed.) 90 capsule 2    propranolol (INDERAL) 10 MG tablet Take 1-2 tablets (10-20 mg) by mouth daily as needed (palpitations) 10 tablet 2    rosuvastatin (CRESTOR) 10 MG tablet Take 1 tablet (10 mg) by mouth daily 90 tablet 3    tiotropium (SPIRIVA RESPIMAT) 2.5 MCG/ACT inhaler Inhale 2 puffs into the lungs daily.            Past Medical History     Past Medical History:   Diagnosis Date    Dysthymic disorder     Essential hypertension, benign     HSV (herpes simplex virus) anogenital infection     Hyperlipidemia     Hypertrophic cardiomyopathy     Mild intermittent asthma     URI induced    Obstructive sleep apnea     started cpap 5/2024    Palpitations - PVC and PVC bigmeny     Rheumatic fever, history of     no sequelae     Past Surgical History:   Procedure Laterality Date    ABDOMINOPLASTY  2017    COLONOSCOPY  07/2009    hyperplastic polyp; repeat 10 years    COLONOSCOPY  12/2020    repeat 5 years; tubular adenoma    ENDOMETRIAL ABLATION  2006    ENDOSCOPIC POLYPECTOMY NASAL  2002    MAMMOPLASTY REDUCTION Bilateral 2017    TONSILLECTOMY       Family History   Problem Relation Age of Onset    C.A.D. Brother         age 50    Heart Disease Brother     C.A.D. Mother     Heart Disease Mother     Cancer Mother         ovarian OK now    Cancer - colorectal Mother         cancerous polyp    Dementia Mother     C.A.D. Father         MI    Heart Disease Father             Allergies   Atorvastatin and Penicillin [penicillins]        Cassidy Cortez NP  [facility]  Pager: [phone]

## 2024-08-23 NOTE — PATIENT INSTRUCTIONS
Today's Recommendations    You can get a Placecasta mobile device to use at home and check your EKG  I would like to do a stress test in a month before you see Dr. Griffith in October   Keep checking your blood pressure at home   Continue all medications without changes.  Please follow up with Dr. Griffith in October as planned.    Please send a EmergentDetection message or call [phone] to the RN team with questions or concerns.     Scheduling number [phone]  COLETTE Moseley, CNP

## 2024-08-23 NOTE — LETTER
8/23/2024    Physician No Ref-Primary  No address on file    RE: Felisha Gorman       Dear Colleague,     I had the pleasure of seeing Felisha Gorman in the [facility].  Cardiology Clinic Progress Note  Felisha Gorman MRN# [id number]   YOB: 1960 Age: 64 year old   Primary Cardiologist: Dr. Griffith Reason for visit: Follow up atrial fibrillation             Assessment and Plan:     Recent diagnosis of atrial fibrillation, s/p DCCV (7/2024)  -[id number] score of 3 (age+, HTN+, Female +)  -On diltiazem XR 240mg daily    2. Hypertension, controlled    3. Hyperlipidemia, goal LDL <100  -Lipid panel has significantly improved with addition of rosuvastatin 10 mg daily LDL now 39 (6/2024)    4. Moderate asthma  -11/2023 PFTs showed mild obstruction and moderate restriction     5. Obstructive sleep apnea, compliant with CPAP    Plan:  Continue eliquis 5mg BID for thromboembolic prophylaxis   Stress echocardiogram prior to next clinic visit for ischemic evaluation considering new onset atrial fibrillation  Continue diltiazem and losartan for hypertension  Recommended bringing and using her inhaler when she is exerting herself to see if this helps with her exertional dyspnea    Follow up plan: Patient to follow-up with Dr. Griffith in October as scheduled         History of Presenting Illness:    Felisha Gorman is a very pleasant 63 year old female with a history of hypertension, anxiety, and palpitations.    Her last TTE in 9/2022 showed hyperdynamic LV function with an LVEF greater than 70, normal LV size, mild concentric LVH, no significant valvular abnormalities.  Dr. Griffith reviewed this and questioned possible hypertrophic obstructive cardiomyopathy, partial LV outflow tract obstruction, and possible JESSICA of the mitral valve cords.    Patient was seen by Dr. Griffith most recently in December 2022.  That time I calculated her 10-year ASCVD risk score at 6.5%, improved from 15% in  October 2022, secondary to improvement in her hypertension after starting nebivolol.  He started Crestor 10 mg daily.  She previously did not tolerate atorvastatin.    I saw Felisha in February 2024 at which point she was doing well following a starting rosuvastatin.  She then saw Dr. Griffith in May 2024 and her lipid panel showed a slight in her numbers with an LDL of 101  She stopped taking her rosuvastatin at that point.  She also was off her Bystolic and was notably hypertensive.  He started her alternatively on diltiazem.  He also recommended weight loss, possibly with GLP-1 medications.    In July 2024, she presented to the emergency room with palpitations and her EKG revealed she was in rate controlled atrial fibrillation.  They performed cardioversion and discharged her on Eliquis thromboembolic prophylaxis. TSH was normal at 2.63.     Last week she was seen in the ER with abdominal pressure and shortness of breath. She felt like she could not take a deep breath. An EKG was done which did not show any ischemic changes and she was in sinus rhythm.     Patient is here today for follow up of her recent diagnosis of atrial fibrillation. She notes she was quite symptomatic when she presented with her atrial fibrillation.     Patient denies chest pain or chest tightness. Denies dizziness, lightheadedness,  or presyncopal symptoms. Denies tachycardia or palpitations.  Denies shortness of breath, orthopnea, or PND.    She walks 30 minutes a day outside and feels well, however if she goes up a hill she gets short of breath.     Labs from 6/28/2024 total cholesterol 112, HDL 43, LDL 39, triglycerides 149, 8/18/2024 with hemoglobin 13.6, platelets 244, ALT 35, BMP with sodium 141, potassium 3.8, BUN 15.2, creatinine 0.82, GFR of 78..     Blood pressure 160/70 and HR 70 in clinic today. She checks her blood pressure at home and her readings have been typically in the 120 systolic range following medication in the morning,  in the evening the lowest was 107 systolic.     She denies bleeding issues with eliquis.         Recent Hospitalizations   ER x 2 in July and August, new afib and shortness of breath           Social History      Social History     Socioeconomic History     Marital status:      Spouse name: Dorian     Number of children: 1     Years of education: Not on file     Highest education level: Not on file   Occupational History     Occupation:       Employer: [employer]   Tobacco Use     Smoking status: Former     Current packs/day: 0.00     Average packs/day: 2.0 packs/day for 10.0 years (20.0 ttl pk-yrs)     Types: Cigarettes     Start date: 1976     Quit date: 1986     Years since quittin.6     Smokeless tobacco: Never   Vaping Use     Vaping status: Never Used   Substance and Sexual Activity     Alcohol use: Not Currently     Alcohol/week: 0.0 - 0.8 standard drinks of alcohol     Drug use: No     Sexual activity: Yes     Partners: Male     Birth control/protection: Condom   Other Topics Concern      Service Not Asked     Blood Transfusions Not Asked     Caffeine Concern Not Asked     Occupational Exposure Not Asked     Hobby Hazards Not Asked     Sleep Concern Not Asked     Stress Concern Not Asked     Weight Concern Not Asked     Special Diet No     Comment: stress eating recently     Back Care Not Asked     Exercise No     Bike Helmet Not Asked     Seat Belt Not Asked     Self-Exams Not Asked     Parent/sibling w/ CABG, MI or angioplasty before 65F 55M? Yes   Social History Narrative     Not on file     Social Determinants of Health     Financial Resource Strain: Not on file   Food Insecurity: Not on file   Transportation Needs: Not on file   Physical Activity: Not on file   Stress: Not on file   Social Connections: Not on file   Interpersonal Safety: Low Risk  (2024)    Interpersonal Safety      Do you feel physically and emotionally safe where  "you currently live?: Yes      Within the past 12 months, have you been hit, slapped, kicked or otherwise physically hurt by someone?: No      Within the past 12 months, have you been humiliated or emotionally abused in other ways by your partner or ex-partner?: No   Housing Stability: Not on file            Review of Systems:   Skin:  not assessed     Eyes:  not assessed    ENT:  not assessed    Respiratory:  Positive for shortness of breath;cough;sleep apnea;CPAP  Cardiovascular:    palpitations;Positive for  Gastroenterology: not assessed    Genitourinary:  not assessed    Musculoskeletal:  not assessed    Neurologic:  not assessed    Psychiatric:  not assessed    Heme/Lymph/Imm:  not assessed    Endocrine:  not assessed           Physical Exam:   Vitals: BP (!) 160/70   Pulse 70   Ht 1.753 m (5' 9\")   Wt 94.9 kg (209 lb 3.2 oz)   LMP 09/29/2006 (Exact Date)   BMI 30.89 kg/m     Wt Readings from Last 4 Encounters:   08/23/24 94.9 kg (209 lb 3.2 oz)   08/18/24 94.7 kg (208 lb 12.4 oz)   07/30/24 94.4 kg (208 lb 3.2 oz)   05/28/24 97 kg (213 lb 14.4 oz)     GEN: well nourished, in no acute distress.  HEENT:  Pupils equal, round. Sclerae nonicteric.   NECK: Supple, no masses appreciated.  No JVD with patient supine.  C/V:  Regular rate and rhythm, no murmur, rub or gallop.    RESP: Respirations are unlabored. Clear to auscultation bilaterally without wheezing, rales, or rhonchi.  GI: Abdomen soft, nontender.  EXTREM: No LE edema.  NEURO: Alert and oriented, cooperative.  SKIN: Warm and dry.        Data:       LIPID RESULTS:  Lab Results   Component Value Date    CHOL 112 06/28/2024    CHOL 220 (H) 04/30/2021    HDL 43 (L) 06/28/2024    HDL 45 (L) 04/30/2021    LDL 39 06/28/2024     (H) 04/30/2021    TRIG 149 06/28/2024    TRIG 152 (H) 04/30/2021    CHOLHDLRATIO 5.5 (H) 03/25/2014     LIVER ENZYME RESULTS:  Lab Results   Component Value Date    AST 20 08/18/2024    AST 15 01/13/2020    ALT 35 08/18/2024    " ALT 9 01/28/2020     CBC RESULTS:  Lab Results   Component Value Date    WBC 8.2 08/18/2024    WBC 12.1 (H) 01/01/2021    RBC 4.64 08/18/2024    RBC 4.86 01/01/2021    HGB 13.6 08/18/2024    HGB 14.7 01/01/2021    HCT 41.8 08/18/2024    HCT 45.5 01/01/2021    MCV 90 08/18/2024    MCV 94 01/01/2021    MCH 29.3 08/18/2024    MCH 30.2 01/01/2021    MCHC 32.5 08/18/2024    MCHC 32.3 01/01/2021    RDW 13.2 08/18/2024    RDW 12.9 01/01/2021     08/18/2024     01/01/2021     BMP RESULTS:  Lab Results   Component Value Date     08/18/2024     01/01/2021    POTASSIUM 3.8 08/18/2024    POTASSIUM 3.5 07/23/2022    POTASSIUM 3.6 01/01/2021    CHLORIDE 106 08/18/2024    CHLORIDE 109 07/23/2022    CHLORIDE 109 01/01/2021    CO2 23 08/18/2024    CO2 24 07/23/2022    CO2 25 01/01/2021    ANIONGAP 12 08/18/2024    ANIONGAP 5 07/23/2022    ANIONGAP 7 01/01/2021     (H) 08/18/2024     (H) 07/23/2022    GLC 84 04/30/2021    BUN 15.2 08/18/2024    BUN 25 07/23/2022    BUN 21 01/01/2021    CR 0.82 08/18/2024    CR 0.76 01/01/2021    GFRESTIMATED 78 08/18/2024    GFRESTIMATED 83 01/01/2021    GFRESTBLACK >90 01/01/2021    JACK 8.8 08/18/2024    JACK 8.9 01/01/2021      A1C RESULTS:  Lab Results   Component Value Date    A1C 5.2 04/30/2021     INR RESULTS:  Lab Results   Component Value Date    INR 1.09 06/16/2005            Medications     Current Outpatient Medications   Medication Sig Dispense Refill     albuterol (PROAIR HFA/PROVENTIL HFA/VENTOLIN HFA) 108 (90 Base) MCG/ACT inhaler Inhale 2 puffs into the lungs every 6 hours as needed for shortness of breath, wheezing or cough 18 g 0     ALPRAZolam (XANAX) 0.5 MG tablet Take 1 tablet (0.5 mg) by mouth 3 times daily as needed for anxiety 20 tablet 0     apixaban ANTICOAGULANT (ELIQUIS) 5 MG tablet Take 1 tablet (5 mg) by mouth 2 times daily 60 tablet 2     cetirizine (ZYRTEC) 10 MG tablet Take 10 mg by mouth as needed.       diltiazem ER (DILT-XR)  240 MG 24 hr ER beaded capsule Take 1 capsule (240 mg) by mouth every morning 90 capsule 4     FLUoxetine (PROZAC) 10 MG capsule Take 1 capsule (10 mg) by mouth daily (Patient taking differently: Take 10 mg by mouth as needed.) 90 capsule 1     losartan (COZAAR) 25 MG tablet Take 1 tablet (25 mg) by mouth 2 times daily 180 tablet 3     omeprazole (PRILOSEC) 20 MG DR capsule Take 1 capsule (20 mg) by mouth daily (Patient taking differently: Take 20 mg by mouth as needed.) 90 capsule 2     propranolol (INDERAL) 10 MG tablet Take 1-2 tablets (10-20 mg) by mouth daily as needed (palpitations) 10 tablet 2     rosuvastatin (CRESTOR) 10 MG tablet Take 1 tablet (10 mg) by mouth daily 90 tablet 3     tiotropium (SPIRIVA RESPIMAT) 2.5 MCG/ACT inhaler Inhale 2 puffs into the lungs daily.            Past Medical History     Past Medical History:   Diagnosis Date     Dysthymic disorder      Essential hypertension, benign      HSV (herpes simplex virus) anogenital infection      Hyperlipidemia      Hypertrophic cardiomyopathy      Mild intermittent asthma     URI induced     Obstructive sleep apnea     started cpap 5/2024     Palpitations - PVC and PVC bigmeny      Rheumatic fever, history of     no sequelae     Past Surgical History:   Procedure Laterality Date     ABDOMINOPLASTY  2017     COLONOSCOPY  07/2009    hyperplastic polyp; repeat 10 years     COLONOSCOPY  12/2020    repeat 5 years; tubular adenoma     ENDOMETRIAL ABLATION  2006     ENDOSCOPIC POLYPECTOMY NASAL  2002     MAMMOPLASTY REDUCTION Bilateral 2017     TONSILLECTOMY       Family History   Problem Relation Age of Onset     C.A.D. Brother         age 50     Heart Disease Brother      C.A.D. Mother      Heart Disease Mother      Cancer Mother         ovarian OK now     Cancer - colorectal Mother         cancerous polyp     Dementia Mother      C.A.D. Father         MI     Heart Disease Father             Allergies   Atorvastatin and Penicillin  [penicillins]        Cassidy Cortez NP  [facility]  Pager: [phone]      Thank you for allowing me to participate in the care of your patient.      Sincerely,     Cassidy Cortez NP     [facility]  cc:   Daniel Griffith MD  [address]  [place]  [place] [id number]

## 2024-08-27 ENCOUNTER — VIRTUAL VISIT (OUTPATIENT)
Dept: PEDIATRICS | Facility: CLINIC | Age: 64
End: 2024-08-27
Payer: COMMERCIAL

## 2024-08-27 DIAGNOSIS — F33.8 SEASONAL AFFECTIVE DISORDER (H): Primary | ICD-10-CM

## 2024-08-27 DIAGNOSIS — F32.9 REACTIVE DEPRESSION: ICD-10-CM

## 2024-08-27 PROCEDURE — 99214 OFFICE O/P EST MOD 30 MIN: CPT | Mod: 95 | Performed by: INTERNAL MEDICINE

## 2024-08-27 PROCEDURE — G2211 COMPLEX E/M VISIT ADD ON: HCPCS | Mod: 95 | Performed by: INTERNAL MEDICINE

## 2024-08-27 NOTE — PROGRESS NOTES
Felisha is a 64 year old who is being evaluated via a billable video visit.          Assessment & Plan     (F33.8) Seasonal affective disorder (H24)  (primary encounter diagnosis)  Comment: recommend trying a SAD light   Plan: SAD Light, 10,000 Lux Order            (F32.9) Reactive depression  Comment: recommend starting with mental health therapy, and as the season progresses may consider starting the 10 mg fluoxetine prescription sent at her last visit.       The longitudinal plan of care for the diagnosis(es)/condition(s) as documented were addressed during this visit. Due to the added complexity in care, I will continue to support Felisha in the subsequent management and with ongoing continuity of care.       34 minutes spent by me on the date of the encounter doing chart review, history and exam, documentation and further activities per the note            Subjective   Felisha is a 64 year old, presenting for the following health issues:  No chief complaint on file.    HPI       At her annual visit last month Felisha had been feeling lower mood due to new medical issues.  We re-started fluoxetine 10 mg, but she has not started the med yet. She hesitates because she feels she may be able to kick this by being active and busy all day. She is a bit hesitant to add another medication.     She notes that she is on the internet a lot, doing research and this she feels causes her to get more depressed.     She woke up with a pressure in her chest and difficulty breathing and she was shaking.  This was 10 days ago, she went to the ED. There were no concerning findings on labs, EKG, or physical exam.  Slowly, the feeling resolved while she was in the ED.  She thinks she may have just changed from trelegy to spiriva; wonders if this could have been the cause. The feeling has not come back.  She has seen cardiology since then and they are planning a stress test.     She is consistent with daily walks, and healthy foods, limiting  diet to eat less food.      She notes she does have digestive issues too. She gets full very easily, and constipated very easily.                       Objective           Vitals:  No vitals were obtained today due to virtual visit.    Physical Exam   GENERAL: alert and no distress  EYES: Eyes grossly normal to inspection.  No discharge or erythema, or obvious scleral/conjunctival abnormalities.  RESP: No audible wheeze, cough, or visible cyanosis.    SKIN: Visible skin clear. No significant rash, abnormal pigmentation or lesions.  NEURO: Cranial nerves grossly intact.  Mentation and speech appropriate for age.  PSYCH: Appropriate affect, tone, and pace of words          Video-Visit Details    Type of service:  Video Visit   Originating Location (pt. Location): Home    Distant Location (provider location):  On-site  Platform used for Video Visit: [employer]  Signed Electronically by: Mary Rodas MD  DME (Durable Medical Equipment) Orders and Documentation  Orders Placed This Encounter   Procedures    SAD Light, 10,000 Lux Order        The patient was assessed and it was determined the patient is in need of the following listed DME Supplies/Equipment. Please complete supporting documentation below to demonstrate medical necessity.

## 2024-08-27 NOTE — PATIENT INSTRUCTIONS
Nice to see you Felisha.     Make the appointment for mental health therapy.   Check out the SAD (seasonal affective disorder) light at the [employer].   You can start the fluoxetine 10 mg daily at any time. If you do, make an appointment to see me 4 weeks later.

## 2024-09-07 PROCEDURE — 93005 ELECTROCARDIOGRAM TRACING: CPT

## 2024-09-07 PROCEDURE — 99284 EMERGENCY DEPT VISIT MOD MDM: CPT

## 2024-09-07 ASSESSMENT — COLUMBIA-SUICIDE SEVERITY RATING SCALE - C-SSRS
6. HAVE YOU EVER DONE ANYTHING, STARTED TO DO ANYTHING, OR PREPARED TO DO ANYTHING TO END YOUR LIFE?: NO
1. IN THE PAST MONTH, HAVE YOU WISHED YOU WERE DEAD OR WISHED YOU COULD GO TO SLEEP AND NOT WAKE UP?: NO
2. HAVE YOU ACTUALLY HAD ANY THOUGHTS OF KILLING YOURSELF IN THE PAST MONTH?: NO

## 2024-09-08 ENCOUNTER — HOSPITAL ENCOUNTER (EMERGENCY)
Facility: CLINIC | Age: 64
Discharge: HOME | End: 2024-09-08
Attending: EMERGENCY MEDICINE | Admitting: EMERGENCY MEDICINE
Payer: COMMERCIAL

## 2024-09-08 VITALS
OXYGEN SATURATION: 98 % | TEMPERATURE: 98.1 F | WEIGHT: 205 LBS | RESPIRATION RATE: 18 BRPM | HEIGHT: 69 IN | HEART RATE: 67 BPM | SYSTOLIC BLOOD PRESSURE: 162 MMHG | DIASTOLIC BLOOD PRESSURE: 81 MMHG | BODY MASS INDEX: 30.36 KG/M2

## 2024-09-08 DIAGNOSIS — J06.9 VIRAL URI WITH COUGH: ICD-10-CM

## 2024-09-08 LAB
ANION GAP SERPL CALCULATED.3IONS-SCNC: 13 MMOL/L (ref 7–15)
BASOPHILS # BLD AUTO: 0.1 10E3/UL (ref 0–0.2)
BASOPHILS NFR BLD AUTO: 1 %
BUN SERPL-MCNC: 21.2 MG/DL (ref 8–23)
CALCIUM SERPL-MCNC: 9.4 MG/DL (ref 8.8–10.4)
CHLORIDE SERPL-SCNC: 103 MMOL/L (ref 98–107)
CREAT SERPL-MCNC: 0.9 MG/DL (ref 0.51–0.95)
EGFRCR SERPLBLD CKD-EPI 2021: 70 ML/MIN/1.73M2
EOSINOPHIL # BLD AUTO: 0.3 10E3/UL (ref 0–0.7)
EOSINOPHIL NFR BLD AUTO: 3 %
ERYTHROCYTE [DISTWIDTH] IN BLOOD BY AUTOMATED COUNT: 13 % (ref 10–15)
GLUCOSE SERPL-MCNC: 90 MG/DL (ref 70–99)
HCO3 SERPL-SCNC: 23 MMOL/L (ref 22–29)
HCT VFR BLD AUTO: 41.3 % (ref 35–47)
HGB BLD-MCNC: 13.8 G/DL (ref 11.7–15.7)
HOLD SPECIMEN: NORMAL
HOLD SPECIMEN: NORMAL
IMM GRANULOCYTES # BLD: 0 10E3/UL
IMM GRANULOCYTES NFR BLD: 0 %
LYMPHOCYTES # BLD AUTO: 3.9 10E3/UL (ref 0.8–5.3)
LYMPHOCYTES NFR BLD AUTO: 34 %
MCH RBC QN AUTO: 29.7 PG (ref 26.5–33)
MCHC RBC AUTO-ENTMCNC: 33.4 G/DL (ref 31.5–36.5)
MCV RBC AUTO: 89 FL (ref 78–100)
MONOCYTES # BLD AUTO: 0.9 10E3/UL (ref 0–1.3)
MONOCYTES NFR BLD AUTO: 8 %
NEUTROPHILS # BLD AUTO: 6.1 10E3/UL (ref 1.6–8.3)
NEUTROPHILS NFR BLD AUTO: 54 %
NRBC # BLD AUTO: 0 10E3/UL
NRBC BLD AUTO-RTO: 0 /100
PLATELET # BLD AUTO: 351 10E3/UL (ref 150–450)
POTASSIUM SERPL-SCNC: 3.7 MMOL/L (ref 3.4–5.3)
RBC # BLD AUTO: 4.65 10E6/UL (ref 3.8–5.2)
SODIUM SERPL-SCNC: 139 MMOL/L (ref 135–145)
TROPONIN T SERPL HS-MCNC: <6 NG/L
WBC # BLD AUTO: 11.3 10E3/UL (ref 4–11)

## 2024-09-08 PROCEDURE — 36415 COLL VENOUS BLD VENIPUNCTURE: CPT | Performed by: EMERGENCY MEDICINE

## 2024-09-08 PROCEDURE — 80048 BASIC METABOLIC PNL TOTAL CA: CPT | Performed by: EMERGENCY MEDICINE

## 2024-09-08 PROCEDURE — 85025 COMPLETE CBC W/AUTO DIFF WBC: CPT | Performed by: EMERGENCY MEDICINE

## 2024-09-08 PROCEDURE — 84484 ASSAY OF TROPONIN QUANT: CPT | Performed by: EMERGENCY MEDICINE

## 2024-09-08 RX ORDER — PREDNISONE 20 MG/1
TABLET ORAL
Qty: 10 TABLET | Refills: 0 | Status: SHIPPED | OUTPATIENT
Start: 2024-09-08

## 2024-09-08 ASSESSMENT — ACTIVITIES OF DAILY LIVING (ADL): ADLS_ACUITY_SCORE: 35

## 2024-09-08 NOTE — ED PROVIDER NOTES
"  Emergency Department Note      History of Present Illness     Chief Complaint   Asthma Exacerbation and Chest Pain    HPI   Felisha Gorman is a 64 year old female with a history of hypertension, Asthma, paroxysmal atrial fibrillation and hypertrophic cardiomyopathy (currently on Eliquis) who presents with spouse to the ED for an evaluation of asthma exacerbation. The patient reports that she began having a persistent cough since 09/03/2024. She notes she did use her emergency inhaler which did help but when she used it again tonight, the cough was still there. She describes her chest pain as a pressure and heaviness in her chest. Describes her cough as a \"barking cough\" and worsens with sitting down or after she eats. Reports that she recently had medication changes and has seen a decline in her health due to those changes. Endorses shortness of breath today. States that she did have a Flu shot on 09/02/2024. Denies any nausea, vomiting and fever. No syncope episodes. No known exposure. No at home testing.      Independent Historian   Patient, as per HPI.     Review of External Notes   I have reviewed the patient's ED visit note from  07/10/2024 and 08/18/2024.     Past Medical History     Medical History and Problem List   Past Medical History:   Diagnosis Date    Dysthymic disorder     Essential hypertension, benign     HSV (herpes simplex virus) anogenital infection     Hyperlipidemia     Hypertrophic cardiomyopathy     Mild intermittent asthma     Obstructive sleep apnea     Palpitations - PVC and PVC bigmeny     Paroxysmal atrial fibrillation     Rheumatic fever, history of        Medications   albuterol (PROAIR HFA/PROVENTIL HFA/VENTOLIN HFA) 108 (90 Base) MCG/ACT inhaler  ALPRAZolam (XANAX) 0.5 MG tablet  apixaban ANTICOAGULANT (ELIQUIS) 5 MG tablet  cetirizine (ZYRTEC) 10 MG tablet  diltiazem ER (DILT-XR) 240 MG 24 hr ER beaded capsule  FLUoxetine (PROZAC) 10 MG capsule  losartan (COZAAR) 25 MG " "tablet  omeprazole (PRILOSEC) 20 MG DR capsule  propranolol (INDERAL) 10 MG tablet  rosuvastatin (CRESTOR) 10 MG tablet  tiotropium (SPIRIVA RESPIMAT) 2.5 MCG/ACT inhaler    Surgical History   Abdominoplasty   Colonoscopy   Endometrial ablation   Endoscopic polypectomy nasal   Mammoplasty reduction   Tonsillectomy     Physical Exam     Patient Vitals for the past 24 hrs:   BP Temp Temp src Pulse Resp SpO2 Height Weight   09/08/24 0121 -- -- -- -- -- -- 1.753 m (5' 9\") 93 kg (205 lb)   09/08/24 0115  162/81 -- -- 67 -- 98 % -- --   09/08/24 0100  154/68 -- -- 67 -- 97 % -- --   09/08/24 0045  152/68 -- -- 62 -- 97 % -- --   09/08/24 0040  152/68 -- -- 58 -- 96 % -- --   09/08/24 0030  165/77 -- -- 69 -- -- -- --   09/07/24 2359  153/73 -- -- -- -- -- -- --   09/07/24 2355 -- 98.1  F (36.7  C) Temporal 64 18 99 % -- --     Physical Exam  Nursing note and vitals reviewed.  Constitutional: Cooperative.   HENT:   Mouth/Throat: Mucous membranes are normal.   Cardiovascular: Normal rate, regular rhythm and normal heart sounds.  No murmur.  Pulmonary/Chest: Effort normal and breath sounds normal. No respiratory distress. No wheezes. No rales.   Abdominal: Soft. Normal appearance. There is no tenderness. There is no rigidity and no guarding.   Musculoskeletal: No lower extremity edema   Neurological: Alert.  Oriented x 3  Skin: Skin is warm and dry.  Psychiatric: Normal mood and affect.     Diagnostics     Lab Results   Labs Ordered and Resulted from Time of ED Arrival to Time of ED Departure   CBC WITH PLATELETS AND DIFFERENTIAL - Abnormal       Result Value    WBC Count 11.3 (*)     RBC Count 4.65      Hemoglobin 13.8      Hematocrit 41.3      MCV 89      MCH 29.7      MCHC 33.4      RDW 13.0      Platelet Count 351      % Neutrophils 54      % Lymphocytes 34      % Monocytes 8      % Eosinophils 3      % Basophils 1      % Immature Granulocytes 0      NRBCs per 100 WBC 0      Absolute Neutrophils 6.1      Absolute " Lymphocytes 3.9      Absolute Monocytes 0.9      Absolute Eosinophils 0.3      Absolute Basophils 0.1      Absolute Immature Granulocytes 0.0      Absolute NRBCs 0.0     BASIC METABOLIC PANEL - Normal    Sodium 139      Potassium 3.7      Chloride 103      Carbon Dioxide (CO2) 23      Anion Gap 13      Urea Nitrogen 21.2      Creatinine 0.90      GFR Estimate 70      Calcium 9.4      Glucose 90     TROPONIN T, HIGH SENSITIVITY - Normal    Troponin T, High Sensitivity <6       EKG   ECG taken at 0006, ECG read at 0008  Normal sinus rhythm   Left axis deviation   Possible anterior infract  Rate 66 bpm. VA interval 194 ms. QRS duration 106 ms. QT/QTc 438/459 ms. P-R-T axes 46 -38 55.    ED Course      Medications Administered   Medications - No data to display    Discussion of Management   None    ED Course   ED Course as of 09/08/24 0122   Sun Sep 08, 2024   0028 I have obtained history and evaluated the patient.        Additional Documentation  None    Medical Decision Making / Diagnosis     [facility]   Felisha Gorman is a 64 year old female who presents with upper respiratory infection symptoms and barky cough.  Exam otherwise reassuring.  Labs as above are reassuring and her EKG is nonischemic.  I do not feel this represents a cardiac pathology.  Symptoms most consistent with viral URI and given her underlying asthma I think should benefit from a steroid pack for reduction in inflammation.  She could continue to use her albuterol as needed.  No concern this represents pulmonary embolism as she is already anticoagulated.  Patient is comfortable with this plan    Disposition   The patient was discharged.     Diagnosis     ICD-10-CM    1. Viral URI with cough  J06.9          Discharge Medications   New Prescriptions    PREDNISONE (DELTASONE) 20 MG TABLET    Take two tablets (= 40mg) each day for 5 (five) days     Scribe Disclosure:  I, Shameka Webb, am serving as a scribe at 12:23 AM on 9/8/2024 to document services  personally performed by Adeel Taylor MD based on my observations and the provider's statements to me.        Adeel Taylor MD  09/08/24 0255

## 2024-09-08 NOTE — DISCHARGE INSTRUCTIONS

## 2024-09-08 NOTE — ED TRIAGE NOTES
PT arrives c/o SOB and chest pressure. She has asthma and normally her inhaler helps loosen up her chest and it has not worked today. Got a flu shot on labor day. VSS, A&Ox4

## 2024-09-09 LAB
ATRIAL RATE - MUSE: 66 BPM
DIASTOLIC BLOOD PRESSURE - MUSE: NORMAL MMHG
INTERPRETATION ECG - MUSE: NORMAL
P AXIS - MUSE: 46 DEGREES
PR INTERVAL - MUSE: 194 MS
QRS DURATION - MUSE: 106 MS
QT - MUSE: 438 MS
QTC - MUSE: 459 MS
R AXIS - MUSE: -38 DEGREES
SYSTOLIC BLOOD PRESSURE - MUSE: NORMAL MMHG
T AXIS - MUSE: 55 DEGREES
VENTRICULAR RATE- MUSE: 66 BPM

## 2024-09-26 ENCOUNTER — OFFICE VISIT (OUTPATIENT)
Dept: PEDIATRICS | Facility: CLINIC | Age: 64
End: 2024-09-26
Payer: COMMERCIAL

## 2024-09-26 ENCOUNTER — ANCILLARY PROCEDURE (OUTPATIENT)
Dept: GENERAL RADIOLOGY | Facility: CLINIC | Age: 64
End: 2024-09-26
Attending: PHYSICIAN ASSISTANT
Payer: COMMERCIAL

## 2024-09-26 ENCOUNTER — NURSE TRIAGE (OUTPATIENT)
Dept: PEDIATRICS | Facility: CLINIC | Age: 64
End: 2024-09-26

## 2024-09-26 VITALS
HEART RATE: 67 BPM | TEMPERATURE: 98.4 F | WEIGHT: 206.6 LBS | OXYGEN SATURATION: 99 % | HEIGHT: 68 IN | BODY MASS INDEX: 31.31 KG/M2 | DIASTOLIC BLOOD PRESSURE: 70 MMHG | RESPIRATION RATE: 18 BRPM | SYSTOLIC BLOOD PRESSURE: 130 MMHG

## 2024-09-26 DIAGNOSIS — J45.20 MILD INTERMITTENT ASTHMA WITHOUT COMPLICATION: ICD-10-CM

## 2024-09-26 DIAGNOSIS — R05.1 ACUTE COUGH: ICD-10-CM

## 2024-09-26 DIAGNOSIS — R05.1 ACUTE COUGH: Primary | ICD-10-CM

## 2024-09-26 DIAGNOSIS — I42.2 HYPERTROPHIC CARDIOMYOPATHY (H): ICD-10-CM

## 2024-09-26 PROCEDURE — 71046 X-RAY EXAM CHEST 2 VIEWS: CPT | Mod: TC | Performed by: RADIOLOGY

## 2024-09-26 PROCEDURE — 99214 OFFICE O/P EST MOD 30 MIN: CPT | Performed by: PHYSICIAN ASSISTANT

## 2024-09-26 RX ORDER — ALBUTEROL SULFATE 90 UG/1
2 AEROSOL, METERED RESPIRATORY (INHALATION) EVERY 6 HOURS PRN
Qty: 18 G | Refills: 2 | Status: SHIPPED | OUTPATIENT
Start: 2024-09-26

## 2024-09-26 ASSESSMENT — PAIN SCALES - GENERAL: PAINLEVEL: MILD PAIN (3)

## 2024-09-26 ASSESSMENT — PATIENT HEALTH QUESTIONNAIRE - PHQ9
10. IF YOU CHECKED OFF ANY PROBLEMS, HOW DIFFICULT HAVE THESE PROBLEMS MADE IT FOR YOU TO DO YOUR WORK, TAKE CARE OF THINGS AT HOME, OR GET ALONG WITH OTHER PEOPLE: SOMEWHAT DIFFICULT
SUM OF ALL RESPONSES TO PHQ QUESTIONS 1-9: 10
SUM OF ALL RESPONSES TO PHQ QUESTIONS 1-9: 10

## 2024-09-26 NOTE — TELEPHONE ENCOUNTER
RN called and spoke with patient. Relayed provider recommendation below. Scheduled patient with Jazzy Cason at 2:30 with a 2:10 arrival.    Gypsy HERRON RN  9/26/2024 at 12:15 PM

## 2024-09-26 NOTE — RESULT ENCOUNTER NOTE
Melanie Baeza ,    The results from your recent lab work are within normal limits.    The chest XR appears stable and unchanged and no signs of acute infection.        Thank you for choosing [facility] for your health care needs,      Jazzy Cason PA-C

## 2024-09-26 NOTE — TELEPHONE ENCOUNTER
Nurse Triage SBAR    Is this a 2nd Level Triage? YES, LICENSED PRACTITIONER REVIEW IS REQUIRED    Situation: worsening asthma, new onset chest pain    Background: Pt called to request refill on her Ventolin inhaler as she has been having more frequent asthma attacks this past month and she is completely out of inhaler. Pt has pulmonologist that she has been working with to get asthma under control. Pt has been on Spiriva for past 4 months and Arnuity Ellipta was added about 3 weeks ago. Pt has upcoming appointment with pulmonologist in a week and a half. Pt reports new onset symptom of chest pain starting 2 days ago.     Assessment: Intermittent pain under left breast for past 2 days lasting for a few seconds at a time. Pain is rated at 2 out of 10. Pt reports pain moves around left breast and is not always in the exact same place. When she presses on the bone, it is sometimes tender to the touch. No radiating pain. Pt had bad asthma attack last night. Pt took rescue inhaler and it took 10-15 minutes for asthma to calm down after Ventolin. Pt has needed rescue inhaler 4 times in last 2 weeks. Pt's asthma attacks consist of the following symptoms: coughing fits, tightness in chest, and stomach bloating. Pt also reports    she has hoarseness as ongoing daily symptom and infrequent cough which occurs daily and sometimes brings up clear sputum.    Protocol Recommended Disposition:   Go To ED/UCC Now (Or To Office With PCP Approval)    Recommendation: Informed pt that protocol recommends ED. Advised ED or 911 if chest pain worsens in frequency or severity. Patient was given an opportunity to ask questions, verbalized understanding of plan, and is agreeable.    ED today for chest pain?  Routing to PCP to review and advise.  Soraya MERRITT RN       Routed to provider    Does the patient meet one of the following criteria for ADS visit consideration? 16+ years old, with an [facility] PCP     TIP  Providers, please consider if this  "condition is appropriate for management at one of our Acute and Diagnostic Services sites.     If patient is a good candidate, please use dotphrase <dot>triageresponse and select Refer to ADS to document.          Reason for Disposition   Chest pain  (Exception: Mild chest tightness that feels the same as prior asthma attacks.)    Additional Information   Negative: SEVERE asthma attack (e.g., struggling for each breath, speaks in single words, loud wheezes, pulse >120) (RED Zone)   Negative: Bluish (or gray) lips or face   Negative: Difficult to awaken or acting confused (e.g., disoriented, slurred speech)   Negative: Passed out (i.e., fainted, collapsed and was not responding)   Negative: Wheezing started suddenly after medicine, an allergic food, or bee sting   Negative: Sounds like a life-threatening emergency to the triager   Negative: MODERATE asthma attack (e.g., SOB at rest, speaks in phrases, audible wheezes) AND doesn't have neb or inhaler available   Negative: Peak flow rate less than 50% of baseline level (RED Zone)   Negative: Oxygen level (e.g., pulse oximetry) 90% or lower   Negative: Hospitalized before with asthma; now feels same   Negative: MODERATE asthma attack (e.g., SOB at rest, speaks in phrases, audible wheezes) and not resolved after 2 or 3 inhaler or nebulizer treatments given 20 minutes apart   Negative: Peak flow rate 50-79% of baseline level (YELLOW zone) after using 2 or 3 inhaler nebulizer treatments given 20 minutes) apart    Answer Assessment - Initial Assessment Questions  1. RESPIRATORY STATUS: \"Describe your breathing?\" (e.g., wheezing, shortness of breath, unable to speak, severe coughing)       Normal  2. ONSET: \"When did this asthma attack begin?\"       Last night  3. TRIGGER: \"What do you think triggered this attack?\" (e.g., URI, exposure to pollen or other allergen, tobacco smoke)       unsure  4. PEAK EXPIRATORY FLOW RATE (PEFR): \"Do you use a peak flow meter?\" If Yes, ask: " "\"What's the current peak flow? What's your personal best peak flow?\"       no  5. SEVERITY: \"How bad is this attack?\"     - MILD: No SOB at rest, mild SOB with walking, speaks normally in sentences, can lie down, no retractions, pulse < 100. (GREEN Zone: PEFR %)    - MODERATE: SOB at rest, SOB with minimal exertion and prefers to sit, cannot lie down flat, speaks in phrases, mild retractions, audible wheezing, pulse 100-120. (YELLOW Zone: PEFR 50-79%)     - SEVERE: Struggling for each breath, speaks in single words, struggling to breathe, sitting hunched forward, retractions, usually loud wheezing, sometimes minimal wheezing because of decreased air movement, pulse > 120. (RED Zone: PEFR < 50%).       Moderate to severe attack last night (constant coughing)  6. ASTHMA MEDICINES:  \"What treatments have you tried?\"     - INHALED QUICK RELIEF (RESCUE): \"What is your inhaled quick-relief medicine?\" (e.g., albuterol, salbutamol) \"Do you use an inhaler or a nebulizer?\" \"How frequently have you been using this medicine?\"    - CONTROLLER (LONG-TERM-CONTROL): \"Do you take an inhaled steroid? (e.g., Asmanex, Flovent, Pulmicort, Qvar)      Ventolin  7. INHALED QUICK-RELIEF TREATMENTS FOR THIS ATTACK: \"What treatments have you given yourself so far?\" and \"How many and how often?\" If using an inhaler, ask, \"How many puffs?\" Note: Routine treatments are 2 puffs every 4 hours as needed. Rescue treatments are 4 puffs repeated every 20 minutes, up to three times as needed.       ventolin  8. OTHER SYMPTOMS: \"Do you have any other symptoms? (e.g., chest pain, coughing up yellow sputum, fever, runny nose)      Has hoarseness ongoing  Infrequent cough occurring daily, brings up clear sputum sometimes    9. O2 SATURATION MONITOR:  \"Do you use an oxygen saturation monitor (pulse oximeter) at home?\" If Yes, \"What is your reading (oxygen level) today?\" \"What is your usual oxygen saturation reading?\" (e.g., 95%)      no  10. " "PREGNANCY: \"Is there any chance you are pregnant?\" \"When was your last menstrual period?\"        N/a    Protocols used: Asthma Attack-A-OH    "

## 2024-09-26 NOTE — TELEPHONE ENCOUNTER
Provider Response to 2nd Level Triage Request    I have reviewed the RN documentation. My recommendation is:  Face To Face Visit. She should be seen today in clinic or urgent care. She likely has asthma exacerbation with viral illness, may need oral steroid burst.  If her pulmonologist can see her today or tomorrow that would also be good.     Chest pain is likely non-cardiac.  If it worsens in intensity or becomes more persistent, or is accompanied by nausea or lightheadedness, then she should go to ED.     Mary Rodas MD   Internal Medicine - Pediatrics

## 2024-10-02 ENCOUNTER — HOSPITAL ENCOUNTER (OUTPATIENT)
Dept: CARDIOLOGY | Facility: CLINIC | Age: 64
Discharge: HOME | End: 2024-10-02
Attending: NURSE PRACTITIONER | Admitting: NURSE PRACTITIONER
Payer: COMMERCIAL

## 2024-10-02 DIAGNOSIS — I48.91 NEW ONSET A-FIB (H): ICD-10-CM

## 2024-10-02 PROCEDURE — 255N000002 HC RX 255 OP 636: Performed by: NURSE PRACTITIONER

## 2024-10-02 PROCEDURE — 999N000208 ECHO STRESS ECHOCARDIOGRAM

## 2024-10-02 PROCEDURE — 93017 CV STRESS TEST TRACING ONLY: CPT

## 2024-10-02 PROCEDURE — 93350 STRESS TTE ONLY: CPT | Mod: 26 | Performed by: INTERNAL MEDICINE

## 2024-10-02 PROCEDURE — 93325 DOPPLER ECHO COLOR FLOW MAPG: CPT | Mod: 26 | Performed by: INTERNAL MEDICINE

## 2024-10-02 PROCEDURE — 93016 CV STRESS TEST SUPVJ ONLY: CPT | Performed by: INTERNAL MEDICINE

## 2024-10-02 PROCEDURE — 93321 DOPPLER ECHO F-UP/LMTD STD: CPT | Mod: 26 | Performed by: INTERNAL MEDICINE

## 2024-10-02 PROCEDURE — 93018 CV STRESS TEST I&R ONLY: CPT | Performed by: INTERNAL MEDICINE

## 2024-10-02 RX ADMIN — HUMAN ALBUMIN MICROSPHERES AND PERFLUTREN 4 ML: 10; .22 INJECTION, SOLUTION INTRAVENOUS at 13:19

## 2024-10-07 ENCOUNTER — OFFICE VISIT (OUTPATIENT)
Dept: CARDIOLOGY | Facility: CLINIC | Age: 64
End: 2024-10-07
Payer: COMMERCIAL

## 2024-10-07 VITALS
HEIGHT: 68 IN | SYSTOLIC BLOOD PRESSURE: 120 MMHG | WEIGHT: 202.1 LBS | OXYGEN SATURATION: 98 % | HEART RATE: 57 BPM | BODY MASS INDEX: 30.63 KG/M2 | DIASTOLIC BLOOD PRESSURE: 62 MMHG

## 2024-10-07 DIAGNOSIS — E78.5 HYPERLIPIDEMIA LDL GOAL <100: ICD-10-CM

## 2024-10-07 DIAGNOSIS — I10 HYPERTENSION GOAL BP (BLOOD PRESSURE) < 140/90: ICD-10-CM

## 2024-10-07 DIAGNOSIS — I48.91 NEW ONSET A-FIB (H): Primary | ICD-10-CM

## 2024-10-07 PROCEDURE — 99215 OFFICE O/P EST HI 40 MIN: CPT | Performed by: INTERNAL MEDICINE

## 2024-10-07 RX ORDER — FLUTICASONE FUROATE 100 UG/1
1 POWDER RESPIRATORY (INHALATION) EVERY MORNING
COMMUNITY

## 2024-10-07 NOTE — LETTER
10/7/2024    Mary Rodas MD  [address] [id number]    RE: Felisha Gorman       Dear Colleague,     I had the pleasure of seeing Felisha Gorman in the [facility].  HPI and Plan:   I the pleasure of following up with Felisha she is a delightful 64-year-old woman.  She has hypertension a hyperdynamic left ventricle but no complete HOCM.        She was on a beta-blocker by systolic for her hyperdynamic left ventricle and her blood pressure she thought it was making her asthma and COPD worse so she was switched to diltiazem shortly after that she had her first episode of atrial fibs previously she had PACs and PVCs.  Her blood pressure is now well-controlled she had to go to the emergency room get cardioverted.  Her thyroid level was normal her blood pressure is now controlled a stress echocardiogram was performed it was negative for ischemia and no significant valvular heart disease.  She is on medication for her emphysema and asthma.  There was no valvular heart disease on her stress echo currently she is doing well she talks about burning and is almost certainly GERD she was told that she cannot get upper endoscopy because of her heart problems I do not think there is anything specific about her heart that would preclude upper endoscopy should she need in the future of note though she is on Eliquis so they might want to have her hold that for 2 days before she is not bleeding anywhere.  Will leave that decision to her primary care doctor and her gastroenterologist if she needs an EGD    We reviewed her lipid numbers are excellent she runs a low HDL but her LDL which was very high over 160 is now completely normal in fact it is even below 50 which might be okay given that her HDL still a little bit low even HDL got better again as a mention her stress echo was negative.    We briefly talked about pill in the pocket if she has recurring episodes atrial fibs I might consider  prescribing flecainide at 300 mg once as needed A-fib in the interim she will continue her diltiazem and she was instructed to take an extra dose of propranolol she has a couple pills left on a as needed basis if she has recurrent atrial fibs and if it does not go away by the next morning she go to the ER n.p.o. for cardioversion.  Will see her back in 1 year we will do lipids electrolytes EKG.    This visit today was 46 minutes all counseling and reviewing the above series of tests    Orders Placed This Encounter   Procedures     Basic metabolic panel     Lipid Profile     ALT     Follow-Up with Cardiology     EKG 12-lead complete w/read - Clinics (to be scheduled)     Orders Placed This Encounter   Medications     ARNUITY ELLIPTA 100 MCG/ACT inhaler     Sig: Inhale 1 puff into the lungs every morning.     There are no discontinued medications.      Encounter Diagnoses   Name Primary?     New onset a-fib (H) Yes     Hypertension goal BP (blood pressure) < 140/90      Hyperlipidemia LDL goal <100        CURRENT MEDICATIONS:  Current Outpatient Medications   Medication Sig Dispense Refill     albuterol (PROAIR HFA/PROVENTIL HFA/VENTOLIN HFA) 108 (90 Base) MCG/ACT inhaler Inhale 2 puffs into the lungs every 6 hours as needed for shortness of breath, wheezing or cough. 18 g 2     albuterol (VENTOLIN HFA) 108 (90 Base) MCG/ACT inhaler INHALE 2 PUFFS INTO THE LUNGS EVERY 6 HOURS AS NEEDED FOR SHORTNESS OF BREATH OR WHEEZING OR COUGH 18 g 1     ALPRAZolam (XANAX) 0.5 MG tablet Take 1 tablet (0.5 mg) by mouth 3 times daily as needed for anxiety 20 tablet 0     apixaban ANTICOAGULANT (ELIQUIS) 5 MG tablet Take 1 tablet (5 mg) by mouth 2 times daily 60 tablet 2     cetirizine (ZYRTEC) 10 MG tablet Take 10 mg by mouth as needed.       diltiazem ER (DILT-XR) 240 MG 24 hr ER beaded capsule Take 1 capsule (240 mg) by mouth every morning 90 capsule 4     FLUoxetine (PROZAC) 10 MG capsule Take 1 capsule (10 mg) by mouth daily  (Patient taking differently: Take 10 mg by mouth as needed.) 90 capsule 1     losartan (COZAAR) 25 MG tablet Take 1 tablet (25 mg) by mouth 2 times daily 180 tablet 3     omeprazole (PRILOSEC) 20 MG DR capsule Take 1 capsule (20 mg) by mouth daily (Patient taking differently: Take 20 mg by mouth as needed.) 90 capsule 2     predniSONE (DELTASONE) 20 MG tablet Take two tablets (= 40mg) each day for 5 (five) days 10 tablet 0     rosuvastatin (CRESTOR) 10 MG tablet Take 1 tablet (10 mg) by mouth daily 90 tablet 3     tiotropium (SPIRIVA RESPIMAT) 2.5 MCG/ACT inhaler Inhale 2 puffs into the lungs daily.       ARNUITY ELLIPTA 100 MCG/ACT inhaler Inhale 1 puff into the lungs every morning.       propranolol (INDERAL) 10 MG tablet Take 1-2 tablets (10-20 mg) by mouth daily as needed (palpitations) (Patient not taking: Reported on 9/26/2024) 10 tablet 2       ALLERGIES     Allergies   Allergen Reactions     Atorvastatin      upset     Penicillin [Penicillins] Rash       PAST MEDICAL HISTORY:  Past Medical History:   Diagnosis Date     Dysthymic disorder      Essential hypertension, benign      HSV (herpes simplex virus) anogenital infection      Hyperlipidemia      Hypertrophic cardiomyopathy      Mild intermittent asthma     URI induced     Obstructive sleep apnea     started cpap 5/2024     Palpitations - PVC and PVC bigmeny      Paroxysmal atrial fibrillation      Rheumatic fever, history of     no sequelae       PAST SURGICAL HISTORY:  Past Surgical History:   Procedure Laterality Date     ABDOMINOPLASTY  2017     COLONOSCOPY  07/2009    hyperplastic polyp; repeat 10 years     COLONOSCOPY  12/2020    repeat 5 years; tubular adenoma     ENDOMETRIAL ABLATION  2006     ENDOSCOPIC POLYPECTOMY NASAL  2002     MAMMOPLASTY REDUCTION Bilateral 2017     TONSILLECTOMY         FAMILY HISTORY:  Family History   Problem Relation Age of Onset     C.A.D. Brother         age 50     Heart Disease Brother      C.A.D. Mother      Heart  Disease Mother      Cancer Mother         ovarian OK now     Cancer - colorectal Mother         cancerous polyp     Dementia Mother      C.A.D. Father         MI     Heart Disease Father        SOCIAL HISTORY:  Social History     Socioeconomic History     Marital status:      Spouse name: Dorian     Number of children: 1     Years of education: None     Highest education level: None   Occupational History     Occupation:       Employer: [employer]   Tobacco Use     Smoking status: Former     Current packs/day: 0.00     Average packs/day: 2.0 packs/day for 10.0 years (20.0 ttl pk-yrs)     Types: Cigarettes     Start date: 1976     Quit date: 1986     Years since quittin.8     Smokeless tobacco: Never   Vaping Use     Vaping status: Never Used   Substance and Sexual Activity     Alcohol use: Not Currently     Alcohol/week: 0.0 - 0.8 standard drinks of alcohol     Drug use: No     Sexual activity: Yes     Partners: Male     Birth control/protection: Condom   Other Topics Concern     Special Diet No     Comment: stress eating recently     Exercise No     Parent/sibling w/ CABG, MI or angioplasty before 65F 55M? Yes     Social Determinants of Health      [facility]    Social Connections   Interpersonal Safety: Low Risk  (2024)    Interpersonal Safety      Do you feel physically and emotionally safe where you currently live?: Yes      Within the past 12 months, have you been hit, slapped, kicked or otherwise physically hurt by someone?: No      Within the past 12 months, have you been humiliated or emotionally abused in other ways by your partner or ex-partner?: No       Review of Systems:  Skin:  not assessed     Eyes:  Positive for glasses  ENT:  not assessed    Respiratory:  Positive for cough  Cardiovascular:  Negative    Gastroenterology: Positive for    Genitourinary:  not assessed    Musculoskeletal:  not  "assessed    Neurologic:  not assessed    Psychiatric:  not assessed    Heme/Lymph/Imm:  not assessed    Endocrine:  not assessed      Physical Exam:  Vitals: /62 (BP Location: Right arm, Patient Position: Sitting, Cuff Size: Adult Regular)   Pulse 57   Ht 1.726 m (5' 7.95\")   Wt 91.7 kg (202 lb 1.6 oz)   LMP 09/29/2006 (Exact Date)   SpO2 98%   BMI 30.77 kg/m   Orthostatic Vitals from 10/05/24 0840 to 10/07/24 0840    Date and Time Orthostatic BP Orthostatic Pulse Patient Position BP   Location Cuff Size   10/07/24 0807 -- -- Sitting Right arm Adult Regular         Constitutional:           Skin:             Head:           Eyes:           Lymph:      ENT:           Neck:           Respiratory:            Cardiac:                                                           GI:           Extremities and Muscular Skeletal:                 Neurological:           Psych:         Recent Lab Results:  LIPID RESULTS:  Lab Results   Component Value Date    CHOL 112 06/28/2024    CHOL 220 (H) 04/30/2021    HDL 43 (L) 06/28/2024    HDL 45 (L) 04/30/2021    LDL 39 06/28/2024     (H) 04/30/2021    TRIG 149 06/28/2024    TRIG 152 (H) 04/30/2021    CHOLHDLRATIO 5.5 (H) 03/25/2014       LIVER ENZYME RESULTS:  Lab Results   Component Value Date    AST 20 08/18/2024    AST 15 01/13/2020    ALT 35 08/18/2024    ALT 9 01/28/2020       CBC RESULTS:  Lab Results   Component Value Date    WBC 11.3 (H) 09/08/2024    WBC 12.1 (H) 01/01/2021    RBC 4.65 09/08/2024    RBC 4.86 01/01/2021    HGB 13.8 09/08/2024    HGB 14.7 01/01/2021    HCT 41.3 09/08/2024    HCT 45.5 01/01/2021    MCV 89 09/08/2024    MCV 94 01/01/2021    MCH 29.7 09/08/2024    MCH 30.2 01/01/2021    MCHC 33.4 09/08/2024    MCHC 32.3 01/01/2021    RDW 13.0 09/08/2024    RDW 12.9 01/01/2021     09/08/2024     01/01/2021       BMP RESULTS:  Lab Results   Component Value Date     09/08/2024     01/01/2021    POTASSIUM 3.7 09/08/2024    " POTASSIUM 3.5 07/23/2022    POTASSIUM 3.6 01/01/2021    CHLORIDE 103 09/08/2024    CHLORIDE 109 07/23/2022    CHLORIDE 109 01/01/2021    CO2 23 09/08/2024    CO2 24 07/23/2022    CO2 25 01/01/2021    ANIONGAP 13 09/08/2024    ANIONGAP 5 07/23/2022    ANIONGAP 7 01/01/2021    GLC 90 09/08/2024     (H) 07/23/2022    GLC 84 04/30/2021    BUN 21.2 09/08/2024    BUN 25 07/23/2022    BUN 21 01/01/2021    CR 0.90 09/08/2024    CR 0.76 01/01/2021    GFRESTIMATED 70 09/08/2024    GFRESTIMATED 83 01/01/2021    GFRESTBLACK >90 01/01/2021    JACK 9.4 09/08/2024    JACK 8.9 01/01/2021        A1C RESULTS:  Lab Results   Component Value Date    A1C 5.2 04/30/2021       INR RESULTS:  Lab Results   Component Value Date    INR 1.09 06/16/2005           CC  Daniel Griffith MD  [address]  [place] [id number]      Thank you for allowing me to participate in the care of your patient.      Sincerely,     Daniel Griffith MD     [facility]  cc:   Daniel Griffith MD  [address]  [place] [id number]

## 2024-10-07 NOTE — PROGRESS NOTES
HPI and Plan:   I the pleasure of following up with Felisha she is a delightful 64-year-old woman.  She has hypertension a hyperdynamic left ventricle but no complete HOCM.        She was on a beta-blocker by systolic for her hyperdynamic left ventricle and her blood pressure she thought it was making her asthma and COPD worse so she was switched to diltiazem shortly after that she had her first episode of atrial fibs previously she had PACs and PVCs.  Her blood pressure is now well-controlled she had to go to the emergency room get cardioverted.  Her thyroid level was normal her blood pressure is now controlled a stress echocardiogram was performed it was negative for ischemia and no significant valvular heart disease.  She is on medication for her emphysema and asthma.  There was no valvular heart disease on her stress echo currently she is doing well she talks about burning and is almost certainly GERD she was told that she cannot get upper endoscopy because of her heart problems I do not think there is anything specific about her heart that would preclude upper endoscopy should she need in the future of note though she is on Eliquis so they might want to have her hold that for 2 days before she is not bleeding anywhere.  Will leave that decision to her primary care doctor and her gastroenterologist if she needs an EGD    We reviewed her lipid numbers are excellent she runs a low HDL but her LDL which was very high over 160 is now completely normal in fact it is even below 50 which might be okay given that her HDL still a little bit low even HDL got better again as a mention her stress echo was negative.    We briefly talked about pill in the pocket if she has recurring episodes atrial fibs I might consider prescribing flecainide at 300 mg once as needed A-fib in the interim she will continue her diltiazem and she was instructed to take an extra dose of propranolol she has a couple pills left on a as needed basis  if she has recurrent atrial fibs and if it does not go away by the next morning she go to the ER n.p.o. for cardioversion.  Will see her back in 1 year we will do lipids electrolytes EKG.    This visit today was 46 minutes all counseling and reviewing the above series of tests    Orders Placed This Encounter   Procedures    Basic metabolic panel    Lipid Profile    ALT    Follow-Up with Cardiology    EKG 12-lead complete w/read - Clinics (to be scheduled)     Orders Placed This Encounter   Medications    ARNUITY ELLIPTA 100 MCG/ACT inhaler     Sig: Inhale 1 puff into the lungs every morning.     There are no discontinued medications.      Encounter Diagnoses   Name Primary?    New onset a-fib (H) Yes    Hypertension goal BP (blood pressure) < 140/90     Hyperlipidemia LDL goal <100        CURRENT MEDICATIONS:  Current Outpatient Medications   Medication Sig Dispense Refill    albuterol (PROAIR HFA/PROVENTIL HFA/VENTOLIN HFA) 108 (90 Base) MCG/ACT inhaler Inhale 2 puffs into the lungs every 6 hours as needed for shortness of breath, wheezing or cough. 18 g 2    albuterol (VENTOLIN HFA) 108 (90 Base) MCG/ACT inhaler INHALE 2 PUFFS INTO THE LUNGS EVERY 6 HOURS AS NEEDED FOR SHORTNESS OF BREATH OR WHEEZING OR COUGH 18 g 1    ALPRAZolam (XANAX) 0.5 MG tablet Take 1 tablet (0.5 mg) by mouth 3 times daily as needed for anxiety 20 tablet 0    apixaban ANTICOAGULANT (ELIQUIS) 5 MG tablet Take 1 tablet (5 mg) by mouth 2 times daily 60 tablet 2    cetirizine (ZYRTEC) 10 MG tablet Take 10 mg by mouth as needed.      diltiazem ER (DILT-XR) 240 MG 24 hr ER beaded capsule Take 1 capsule (240 mg) by mouth every morning 90 capsule 4    FLUoxetine (PROZAC) 10 MG capsule Take 1 capsule (10 mg) by mouth daily (Patient taking differently: Take 10 mg by mouth as needed.) 90 capsule 1    losartan (COZAAR) 25 MG tablet Take 1 tablet (25 mg) by mouth 2 times daily 180 tablet 3    omeprazole (PRILOSEC) 20 MG DR capsule Take 1 capsule (20 mg)  by mouth daily (Patient taking differently: Take 20 mg by mouth as needed.) 90 capsule 2    predniSONE (DELTASONE) 20 MG tablet Take two tablets (= 40mg) each day for 5 (five) days 10 tablet 0    rosuvastatin (CRESTOR) 10 MG tablet Take 1 tablet (10 mg) by mouth daily 90 tablet 3    tiotropium (SPIRIVA RESPIMAT) 2.5 MCG/ACT inhaler Inhale 2 puffs into the lungs daily.      ARNUITY ELLIPTA 100 MCG/ACT inhaler Inhale 1 puff into the lungs every morning.      propranolol (INDERAL) 10 MG tablet Take 1-2 tablets (10-20 mg) by mouth daily as needed (palpitations) (Patient not taking: Reported on 9/26/2024) 10 tablet 2       ALLERGIES     Allergies   Allergen Reactions    Atorvastatin      upset    Penicillin [Penicillins] Rash       PAST MEDICAL HISTORY:  Past Medical History:   Diagnosis Date    Dysthymic disorder     Essential hypertension, benign     HSV (herpes simplex virus) anogenital infection     Hyperlipidemia     Hypertrophic cardiomyopathy     Mild intermittent asthma     URI induced    Obstructive sleep apnea     started cpap 5/2024    Palpitations - PVC and PVC bigmeny     Paroxysmal atrial fibrillation     Rheumatic fever, history of     no sequelae       PAST SURGICAL HISTORY:  Past Surgical History:   Procedure Laterality Date    ABDOMINOPLASTY  2017    COLONOSCOPY  07/2009    hyperplastic polyp; repeat 10 years    COLONOSCOPY  12/2020    repeat 5 years; tubular adenoma    ENDOMETRIAL ABLATION  2006    ENDOSCOPIC POLYPECTOMY NASAL  2002    MAMMOPLASTY REDUCTION Bilateral 2017    TONSILLECTOMY         FAMILY HISTORY:  Family History   Problem Relation Age of Onset    C.A.D. Brother         age 50    Heart Disease Brother     C.A.D. Mother     Heart Disease Mother     Cancer Mother         ovarian OK now    Cancer - colorectal Mother         cancerous polyp    Dementia Mother     C.A.D. Father         MI    Heart Disease Father        SOCIAL HISTORY:  Social History     Socioeconomic History    Marital  status:      Spouse name: Dorian    Number of children: 1    Years of education: None    Highest education level: None   Occupational History    Occupation:       Employer: [employer]   Tobacco Use    Smoking status: Former     Current packs/day: 0.00     Average packs/day: 2.0 packs/day for 10.0 years (20.0 ttl pk-yrs)     Types: Cigarettes     Start date: 1976     Quit date: 1986     Years since quittin.8    Smokeless tobacco: Never   Vaping Use    Vaping status: Never Used   Substance and Sexual Activity    Alcohol use: Not Currently     Alcohol/week: 0.0 - 0.8 standard drinks of alcohol    Drug use: No    Sexual activity: Yes     Partners: Male     Birth control/protection: Condom   Other Topics Concern    Special Diet No     Comment: stress eating recently    Exercise No    Parent/sibling w/ CABG, MI or angioplasty before 65F 55M? Yes     Social Determinants of Health      Received from [facility]    Social Connections   Interpersonal Safety: Low Risk  (2024)    Interpersonal Safety     Do you feel physically and emotionally safe where you currently live?: Yes     Within the past 12 months, have you been hit, slapped, kicked or otherwise physically hurt by someone?: No     Within the past 12 months, have you been humiliated or emotionally abused in other ways by your partner or ex-partner?: No       Review of Systems:  Skin:  not assessed     Eyes:  Positive for glasses  ENT:  not assessed    Respiratory:  Positive for cough  Cardiovascular:  Negative    Gastroenterology: Positive for    Genitourinary:  not assessed    Musculoskeletal:  not assessed    Neurologic:  not assessed    Psychiatric:  not assessed    Heme/Lymph/Imm:  not assessed    Endocrine:  not assessed      Physical Exam:  Vitals: /62 (BP Location: Right arm, Patient Position: Sitting, Cuff Size: Adult Regular)   Pulse 57   Ht 1.726 m (5'  "7.95\")   Wt 91.7 kg (202 lb 1.6 oz)   LMP 09/29/2006 (Exact Date)   SpO2 98%   BMI 30.77 kg/m   Orthostatic Vitals from 10/05/24 0840 to 10/07/24 0840    Date and Time Orthostatic BP Orthostatic Pulse Patient Position BP   Location Cuff Size   10/07/24 0807 -- -- Sitting Right arm Adult Regular         Constitutional:           Skin:             Head:           Eyes:           Lymph:      ENT:           Neck:           Respiratory:            Cardiac:                                                           GI:           Extremities and Muscular Skeletal:                 Neurological:           Psych:         Recent Lab Results:  LIPID RESULTS:  Lab Results   Component Value Date    CHOL 112 06/28/2024    CHOL 220 (H) 04/30/2021    HDL 43 (L) 06/28/2024    HDL 45 (L) 04/30/2021    LDL 39 06/28/2024     (H) 04/30/2021    TRIG 149 06/28/2024    TRIG 152 (H) 04/30/2021    CHOLHDLRATIO 5.5 (H) 03/25/2014       LIVER ENZYME RESULTS:  Lab Results   Component Value Date    AST 20 08/18/2024    AST 15 01/13/2020    ALT 35 08/18/2024    ALT 9 01/28/2020       CBC RESULTS:  Lab Results   Component Value Date    WBC 11.3 (H) 09/08/2024    WBC 12.1 (H) 01/01/2021    RBC 4.65 09/08/2024    RBC 4.86 01/01/2021    HGB 13.8 09/08/2024    HGB 14.7 01/01/2021    HCT 41.3 09/08/2024    HCT 45.5 01/01/2021    MCV 89 09/08/2024    MCV 94 01/01/2021    MCH 29.7 09/08/2024    MCH 30.2 01/01/2021    MCHC 33.4 09/08/2024    MCHC 32.3 01/01/2021    RDW 13.0 09/08/2024    RDW 12.9 01/01/2021     09/08/2024     01/01/2021       BMP RESULTS:  Lab Results   Component Value Date     09/08/2024     01/01/2021    POTASSIUM 3.7 09/08/2024    POTASSIUM 3.5 07/23/2022    POTASSIUM 3.6 01/01/2021    CHLORIDE 103 09/08/2024    CHLORIDE 109 07/23/2022    CHLORIDE 109 01/01/2021    CO2 23 09/08/2024    CO2 24 07/23/2022    CO2 25 01/01/2021    ANIONGAP 13 09/08/2024    ANIONGAP 5 07/23/2022    ANIONGAP 7 01/01/2021    " GLC 90 09/08/2024     (H) 07/23/2022    GLC 84 04/30/2021    BUN 21.2 09/08/2024    BUN 25 07/23/2022    BUN 21 01/01/2021    CR 0.90 09/08/2024    CR 0.76 01/01/2021    GFRESTIMATED 70 09/08/2024    GFRESTIMATED 83 01/01/2021    GFRESTBLACK >90 01/01/2021    JACK 9.4 09/08/2024    JACK 8.9 01/01/2021        A1C RESULTS:  Lab Results   Component Value Date    A1C 5.2 04/30/2021       INR RESULTS:  Lab Results   Component Value Date    INR 1.09 06/16/2005           CC  Daniel Griffith MD  [id number]  [place] [id number]

## 2024-11-08 DIAGNOSIS — I48.91 NEW ONSET A-FIB (H): ICD-10-CM

## 2024-11-08 RX ORDER — APIXABAN 5 MG/1
5 TABLET, FILM COATED ORAL 2 TIMES DAILY
Qty: 60 TABLET | Refills: 2 | Status: SHIPPED | OUTPATIENT
Start: 2024-11-08

## 2024-11-26 ENCOUNTER — MYC REFILL (OUTPATIENT)
Dept: PEDIATRICS | Facility: CLINIC | Age: 64
End: 2024-11-26
Payer: COMMERCIAL

## 2024-11-26 DIAGNOSIS — I48.91 NEW ONSET A-FIB (H): ICD-10-CM

## 2024-11-26 DIAGNOSIS — J45.20 MILD INTERMITTENT ASTHMA WITHOUT COMPLICATION: Primary | ICD-10-CM

## 2024-11-26 NOTE — TELEPHONE ENCOUNTER
Clinic RN: Please investigate patient's chart or contact patient if the information cannot be found because the medication is listed as historical or discontinued. Confirm patient is taking this medication. Document findings and route refill encounter to provider for approval or denial.    Birdie LAMBERT RN  [facility]

## 2024-11-26 NOTE — TELEPHONE ENCOUNTER
Left message to call back any triage nurse.     -Who is the original prescriber of Arnuity Ellipta inhaler?    Shana Be RN

## 2024-11-27 RX ORDER — FLUTICASONE FUROATE 100 UG/1
1 POWDER RESPIRATORY (INHALATION) EVERY MORNING
Qty: 60 EACH | Refills: 3 | Status: SHIPPED | OUTPATIENT
Start: 2024-11-27

## 2024-11-27 NOTE — TELEPHONE ENCOUNTER
Attempt #2, left voicemail to call back and speak with any triage nurse. [employer] message sent at this time.       Montrell CHEN RN 11/27/2024 at 8:45 AM

## 2024-11-27 NOTE — TELEPHONE ENCOUNTER
Patient returning call. Per patient Dr. Oakes is now with [facility] and would like Dr. Rodas to take over the script for Arnuity Ellipta inhaler.     Please advise. Script and pharmacy pended below if appropriate.   Annette Brooks RN

## 2025-01-07 ENCOUNTER — TELEPHONE (OUTPATIENT)
Dept: CARDIOLOGY | Facility: CLINIC | Age: 65
End: 2025-01-07
Payer: COMMERCIAL

## 2025-01-07 NOTE — TELEPHONE ENCOUNTER
Returned Pt's call she had missed her morning dose of medication's, and taken in evening. Asked her to not take Diltiazem until tomorrow morning again, to get back on schedule. It is a 24 hour medication. JEFRY Rodgers RN

## 2025-01-07 NOTE — TELEPHONE ENCOUNTER
[facility]    Phone Message    May a detailed message be left on voicemail: yes     Reason for Call: Other: patient is calling as she usually takes her diltiazem ER (DILT-XR) 240 MG 24 hr ER beaded capsule at about 8 am and yesterday she took it late last night around 8pm and would like to know what time she can take it today, please advise, thank you      Action Taken: Other: cardiologuy     Travel Screening: Not Applicable     Date of Service:

## 2025-02-18 ENCOUNTER — NURSE TRIAGE (OUTPATIENT)
Dept: PEDIATRICS | Facility: CLINIC | Age: 65
End: 2025-02-18
Payer: COMMERCIAL

## 2025-02-18 NOTE — TELEPHONE ENCOUNTER
Nurse Triage SBAR    Is this a 2nd Level Triage? YES, LICENSED PRACTITIONER REVIEW IS REQUIRED    Situation:   - Received a call from the patient   - Patient states that she took a double dose of her losartan medication and her Eliquis medication about 15 minutes ago on accident     Background:     apixaban ANTICOAGULANT (ELIQUIS ANTICOAGULANT) 5 MG tablet 60 tablet 2 11/26/2024 -- No   Sig - Route: Take 1 tablet (5 mg) by mouth 2 times daily. - Oral     losartan (COZAAR) 25 MG tablet 180 tablet 3 5/28/2024 -- No   Sig - Route: Take 1 tablet (25 mg) by mouth 2 times daily - Oral       Assessment:   - Patient states that she took a double dose of her losartan medication and her eliquis medication about 15 minutes ago on accident; patient took a total of 50 mg of losartan and 10 mg of eliquis   - Patient's losartan is prescribed by cardiology; see the other 2/18/2025 Nurse Triage Encounter with cardiology; patient notified cardiology of this as well   - Patient's Eliquis is prescribed by Dr. Rodas   - Patient denies any symptoms at this time; denies chest pain, shortness of breath, difficulty breathing, nausea, vomiting, headache, vision changes, abdominal pain, dizziness, lightheadedness   - Patient took her blood pressure reading while on the phone and reading was 157/79, pulse 77     Protocol Recommended Disposition:   Call Transferred To PCP Now    Recommendation:   - Recommended that the patient continue to monitor for symptom onset and call back if symptoms develop   - Informed the patient to remain hydrated and watch for bleeding or bruising   - Informed the patient of Maricruz Engle PA-C's comments/recommendations; informed the patient to hold her evening dose of her losartan and her eliquis medications     Huddled with Maricruz Figueroa PA-C who states that the patient should hold her evening dose of her losartan and Eliquis medications.     Does the patient meet one of the following criteria for ADS visit  consideration? 16+ years old, with an [facility] PCP     TIP  Providers, please consider if this condition is appropriate for management at one of our Acute and Diagnostic Services sites.     If patient is a good candidate, please use dotphrase <dot>triageresponse and select Refer to ADS to document.    Reason for Disposition   DOUBLE DOSE (an extra dose or lesser amount) of prescription drug and NO symptoms  (Exception: A double dose of antibiotics.)    Additional Information   Negative: Intentional drug overdose and suicidal thoughts or ideas   Negative: Caller requesting information unrelated to medicine   Negative: Caller requesting information about COVID-19 Vaccine   Negative: Caller requesting a renewal or refill of a medicine patient is currently taking   Negative: Drug overdose and triager unable to answer question   Negative: Caller requesting information about Emergency Contraception   Negative: Caller requesting information about Combined Birth Control Pills   Negative: Caller requesting information about Progestin Birth Control Pills   Negative: Caller requesting information about post-op pain or medicines   Negative: Caller requesting a prescription antibiotic (such as penicillin) for Strep throat and has a positive culture result   Negative: Caller requesting a prescription anti-viral med (such as Tamiflu) and has influenza (flu) symptoms   Negative: Immunization reaction suspected   Negative: Rash while taking a medicine or within 3 days of stopping it   Negative: Asthma and having symptoms of asthma (cough, wheezing, etc.)   Negative: Symptom of illness (e.g., headache, abdominal pain, earache, vomiting) that are more than mild   Negative: Breastfeeding questions about mother's medicines and diet   Negative: MORE THAN A DOUBLE DOSE of a prescription or over-the-counter (OTC) drug   Negative: DOUBLE DOSE (an extra dose or lesser amount) of prescription drug and any symptoms (e.g., dizziness, nausea, pain,  "sleepiness)   Negative: DOUBLE DOSE (an extra dose or lesser amount) of over-the-counter (OTC) drug and any symptoms (e.g., dizziness, nausea, pain, sleepiness)   Negative: Took another person's prescription drug    Answer Assessment - Initial Assessment Questions  1. NAME of MEDICINE: \"What medicine(s) are you calling about?\"      Eliquis and losartan; patient states that she took a double dose of her losartan and eliquis medications about 15 minutes ago on accident.   2. QUESTION: \"What is your question?\" (e.g., double dose of medicine, side effect)      Patient states that she took a double dose of her losartan and eliquis medications about 15 minutes ago on accident. Patient took a total of 50 mg of losartan and 10 mg of Eliquis.   3. PRESCRIBER: \"Who prescribed the medicine?\" Reason: if prescribed by specialist, call should be referred to that group.      Eliquis prescribed by Dr. Rodas, losartan is prescribed by cardiology. Patient contacted cardiology to inform them of this as well; see the [facility] 2/18/2025 Nurse Triage Encounter with cardiology.   4. SYMPTOMS: \"Do you have any symptoms?\" If Yes, ask: \"What symptoms are you having?\"  \"How bad are the symptoms (e.g., mild, moderate, severe)      Denies any symptoms. Denies cardiac symptoms; denies chest pain, headache, vision changes, nausea, vomiting, dizziness, abdominal pain, lightheadedness, and shortness of breath or difficulty breathing.   5. PREGNANCY:  \"Is there any chance that you are pregnant?\" \"When was your last menstrual period?\"      Denies.    Protocols used: Medication Question Call-A-OH    Cristela NIÑO RN   [facility]   "

## 2025-02-18 NOTE — TELEPHONE ENCOUNTER
Provider Recommendation Follow Up:   Reached patient/caregiver. Informed of provider's recommendations. Patient verbalized understanding and agrees with the plan.     - Recommended that the patient continue to monitor for symptom onset and call back if symptoms develop   - Informed the patient to remain hydrated and watch for bleeding or bruising   - Informed the patient of Maricruz Engle PA-C's comments/recommendations; informed the patient to hold her evening dose of her losartan and her eliquis medications   - Patient relayed to writer that her current at home blood pressure reading is 122/67, pulse 66   - Patient remains asymptomatic   - Patient verbalized understanding and agrees with the plan     Cristela NIÑO RN   [facility]

## 2025-03-09 DIAGNOSIS — I48.91 NEW ONSET A-FIB (H): ICD-10-CM

## 2025-03-10 RX ORDER — APIXABAN 5 MG/1
5 TABLET, FILM COATED ORAL 2 TIMES DAILY
Qty: 60 TABLET | Refills: 4 | Status: SHIPPED | OUTPATIENT
Start: 2025-03-10

## 2025-05-15 ENCOUNTER — TELEPHONE (OUTPATIENT)
Dept: PEDIATRICS | Facility: CLINIC | Age: 65
End: 2025-05-15
Payer: COMMERCIAL

## 2025-05-15 DIAGNOSIS — N63.20 MASS OF LEFT BREAST, UNSPECIFIED QUADRANT: Primary | ICD-10-CM

## 2025-05-15 NOTE — TELEPHONE ENCOUNTER
Order/Referral Request    Who is requesting: Felisha    Orders being requested: Mammogram     Reason service is needed/diagnosis: Lump in left breast was found while seeing skin doctor    When are orders needed by: ASAP    Has this been discussed with Provider: No    Does patient have a preference on a Group/Provider/Facility? [facility]     Does patient have an appointment scheduled?: No    Where to send orders: Place orders within Epic    Could we send this information to you in [facility] or would you prefer to receive a phone call?:   Patient would prefer a phone call   Okay to leave a detailed message?: Yes at Cell number on file:    Telephone Information:   Mobile [phone]

## 2025-05-16 NOTE — CONFIDENTIAL NOTE
Left breast diagnostic mammogram and ultrasound ordered. She will get a call to schedule.   Mary Rodas MD   Internal Medicine - Pediatrics

## 2025-05-16 NOTE — TELEPHONE ENCOUNTER
1st attempt: left detailed VM stating her PCP placed mammo orders and that she will be receiving a call to schedule. Provided callback number.     Aleta Barr MA 2:56 PM 5/16/2025

## 2025-05-21 ENCOUNTER — MYC MEDICAL ADVICE (OUTPATIENT)
Dept: PEDIATRICS | Facility: CLINIC | Age: 65
End: 2025-05-21
Payer: COMMERCIAL

## 2025-05-22 NOTE — TELEPHONE ENCOUNTER
"Please see patient MyChart message  -states scheduled mammogram 5/29, but noted order is for left breath only  -inquiring if should be bilateral as has not has mammo for a while    Per chart review  5/15/26 US Breast Left and MA Diagnostic Left Breast ordered by Dr. Rodas for left breast lump found by derm    -last mammogram was 12/14/2020 ordered by Aubrie Antoine  \"MPRESSION: BI-RADS CATEGORY: 1 - Negative.  RECOMMENDED FOLLOW-UP: Annual Mammography.  Recommend routine annual screening mammography.\"    Dr. Rodas please review and advise. Patient inquiring if MA should be bilateral. Would need to be not diagnostic if bilateral, correct?    Cristela Kramer, RN   "

## 2025-06-02 ENCOUNTER — HOSPITAL ENCOUNTER (EMERGENCY)
Facility: CLINIC | Age: 65
Discharge: HOME | End: 2025-06-02
Payer: COMMERCIAL

## 2025-06-02 ENCOUNTER — TELEPHONE (OUTPATIENT)
Dept: CARDIOLOGY | Facility: CLINIC | Age: 65
End: 2025-06-02
Payer: COMMERCIAL

## 2025-06-02 ENCOUNTER — APPOINTMENT (OUTPATIENT)
Dept: GENERAL RADIOLOGY | Facility: CLINIC | Age: 65
End: 2025-06-02
Payer: COMMERCIAL

## 2025-06-02 ENCOUNTER — NURSE TRIAGE (OUTPATIENT)
Dept: CARDIOLOGY | Facility: CLINIC | Age: 65
End: 2025-06-02

## 2025-06-02 VITALS
BODY MASS INDEX: 30.89 KG/M2 | DIASTOLIC BLOOD PRESSURE: 70 MMHG | OXYGEN SATURATION: 93 % | HEIGHT: 69 IN | HEART RATE: 57 BPM | SYSTOLIC BLOOD PRESSURE: 136 MMHG | RESPIRATION RATE: 10 BRPM | TEMPERATURE: 98 F | WEIGHT: 208.56 LBS
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
## 2025-06-02 DIAGNOSIS — I10 HYPERTENSION GOAL BP (BLOOD PRESSURE) < 140/90: ICD-10-CM

## 2025-06-02 DIAGNOSIS — S00.06XA TICK BITE OF SCALP, INITIAL ENCOUNTER: ICD-10-CM

## 2025-06-02 DIAGNOSIS — R00.2 PALPITATIONS: ICD-10-CM

## 2025-06-02 DIAGNOSIS — E78.5 HYPERLIPIDEMIA LDL GOAL <100: ICD-10-CM

## 2025-06-02 LAB
ALBUMIN SERPL BCG-MCNC: 4.3 G/DL (ref 3.5–5.2)
ALBUMIN UR-MCNC: NEGATIVE MG/DL
ALP SERPL-CCNC: 88 U/L (ref 40–150)
ALT SERPL W P-5'-P-CCNC: 31 U/L (ref 0–50)
ANION GAP SERPL CALCULATED.3IONS-SCNC: 14 MMOL/L (ref 7–15)
APPEARANCE UR: ABNORMAL
AST SERPL W P-5'-P-CCNC: 18 U/L (ref 0–45)
ATRIAL RATE - MUSE: 80 BPM
B BURGDOR IGG+IGM SER QL: 0.07
BACTERIA #/AREA URNS HPF: ABNORMAL /HPF
BASOPHILS # BLD AUTO: 0.1 10E3/UL (ref 0–0.2)
BASOPHILS NFR BLD AUTO: 1 %
BILIRUB SERPL-MCNC: 0.5 MG/DL
BILIRUB UR QL STRIP: NEGATIVE
BUN SERPL-MCNC: 14.1 MG/DL (ref 8–23)
CALCIUM SERPL-MCNC: 8.9 MG/DL (ref 8.8–10.4)
CHLORIDE SERPL-SCNC: 102 MMOL/L (ref 98–107)
COLOR UR AUTO: YELLOW
CREAT SERPL-MCNC: 0.83 MG/DL (ref 0.51–0.95)
DIASTOLIC BLOOD PRESSURE - MUSE: NORMAL MMHG
EGFRCR SERPLBLD CKD-EPI 2021: 77 ML/MIN/1.73M2
EOSINOPHIL # BLD AUTO: 0.3 10E3/UL (ref 0–0.7)
EOSINOPHIL NFR BLD AUTO: 3 %
ERYTHROCYTE [DISTWIDTH] IN BLOOD BY AUTOMATED COUNT: 13.1 % (ref 10–15)
FLUAV RNA SPEC QL NAA+PROBE: NEGATIVE
FLUBV RNA RESP QL NAA+PROBE: NEGATIVE
GLUCOSE SERPL-MCNC: 109 MG/DL (ref 70–99)
GLUCOSE UR STRIP-MCNC: NEGATIVE MG/DL
HCO3 SERPL-SCNC: 23 MMOL/L (ref 22–29)
HCT VFR BLD AUTO: 43.8 % (ref 35–47)
HGB BLD-MCNC: 14.9 G/DL (ref 11.7–15.7)
HGB UR QL STRIP: ABNORMAL
HOLD SPECIMEN: NORMAL
HOLD SPECIMEN: NORMAL
IMM GRANULOCYTES # BLD: 0 10E3/UL
IMM GRANULOCYTES NFR BLD: 0 %
INTERPRETATION ECG - MUSE: NORMAL
KETONES UR STRIP-MCNC: NEGATIVE MG/DL
LEUKOCYTE ESTERASE UR QL STRIP: ABNORMAL
LIPASE SERPL-CCNC: 22 U/L (ref 13–60)
LYMPHOCYTES # BLD AUTO: 2.4 10E3/UL (ref 0.8–5.3)
LYMPHOCYTES NFR BLD AUTO: 25 %
MAGNESIUM SERPL-MCNC: 1.9 MG/DL (ref 1.7–2.3)
MCH RBC QN AUTO: 29.9 PG (ref 26.5–33)
MCHC RBC AUTO-ENTMCNC: 34 G/DL (ref 31.5–36.5)
MCV RBC AUTO: 88 FL (ref 78–100)
MONOCYTES # BLD AUTO: 1 10E3/UL (ref 0–1.3)
MONOCYTES NFR BLD AUTO: 10 %
MUCOUS THREADS #/AREA URNS LPF: PRESENT /LPF
NEUTROPHILS # BLD AUTO: 6.1 10E3/UL (ref 1.6–8.3)
NEUTROPHILS NFR BLD AUTO: 62 %
NITRATE UR QL: NEGATIVE
NRBC # BLD AUTO: 0 10E3/UL
NRBC BLD AUTO-RTO: 0 /100
P AXIS - MUSE: 48 DEGREES
PH UR STRIP: 5.5 [PH] (ref 5–7)
PLATELET # BLD AUTO: 331 10E3/UL (ref 150–450)
POTASSIUM SERPL-SCNC: 3.6 MMOL/L (ref 3.4–5.3)
PR INTERVAL - MUSE: 168 MS
PROT SERPL-MCNC: 7.1 G/DL (ref 6.4–8.3)
QRS DURATION - MUSE: 96 MS
QT - MUSE: 408 MS
QTC - MUSE: 470 MS
R AXIS - MUSE: -51 DEGREES
RBC # BLD AUTO: 4.99 10E6/UL (ref 3.8–5.2)
RBC URINE: 18 /HPF
RSV RNA SPEC NAA+PROBE: NEGATIVE
SARS-COV-2 RNA RESP QL NAA+PROBE: NEGATIVE
SODIUM SERPL-SCNC: 139 MMOL/L (ref 135–145)
SP GR UR STRIP: 1.02 (ref 1–1.03)
SQUAMOUS EPITHELIAL: 10 /HPF
SYSTOLIC BLOOD PRESSURE - MUSE: NORMAL MMHG
T AXIS - MUSE: 59 DEGREES
TROPONIN T SERPL HS-MCNC: 6 NG/L
TSH SERPL DL<=0.005 MIU/L-ACNC: 2.48 UIU/ML (ref 0.3–4.2)
UROBILINOGEN UR STRIP-MCNC: NORMAL MG/DL
VENTRICULAR RATE- MUSE: 80 BPM
WBC # BLD AUTO: 9.9 10E3/UL (ref 4–11)
WBC URINE: 34 /HPF

## 2025-06-02 PROCEDURE — 85025 COMPLETE CBC W/AUTO DIFF WBC: CPT

## 2025-06-02 PROCEDURE — 71046 X-RAY EXAM CHEST 2 VIEWS: CPT

## 2025-06-02 PROCEDURE — 87086 URINE CULTURE/COLONY COUNT: CPT

## 2025-06-02 PROCEDURE — 99285 EMERGENCY DEPT VISIT HI MDM: CPT | Mod: 25

## 2025-06-02 PROCEDURE — 250N000013 HC RX MED GY IP 250 OP 250 PS 637: Performed by: EMERGENCY MEDICINE

## 2025-06-02 PROCEDURE — 84484 ASSAY OF TROPONIN QUANT: CPT

## 2025-06-02 PROCEDURE — 83690 ASSAY OF LIPASE: CPT

## 2025-06-02 PROCEDURE — 84443 ASSAY THYROID STIM HORMONE: CPT

## 2025-06-02 PROCEDURE — 81001 URINALYSIS AUTO W/SCOPE: CPT

## 2025-06-02 PROCEDURE — 83735 ASSAY OF MAGNESIUM: CPT | Performed by: EMERGENCY MEDICINE

## 2025-06-02 PROCEDURE — 80053 COMPREHEN METABOLIC PANEL: CPT

## 2025-06-02 PROCEDURE — 36415 COLL VENOUS BLD VENIPUNCTURE: CPT

## 2025-06-02 PROCEDURE — 87637 SARSCOV2&INF A&B&RSV AMP PRB: CPT

## 2025-06-02 PROCEDURE — 86618 LYME DISEASE ANTIBODY: CPT | Performed by: EMERGENCY MEDICINE

## 2025-06-02 PROCEDURE — 93005 ELECTROCARDIOGRAM TRACING: CPT

## 2025-06-02 RX ORDER — DOXYCYCLINE 100 MG/1
200 CAPSULE ORAL ONCE
Status: COMPLETED | OUTPATIENT
Start: 2025-06-02 | End: 2025-06-02

## 2025-06-02 RX ORDER — LOSARTAN POTASSIUM 25 MG/1
25 TABLET ORAL 2 TIMES DAILY
Qty: 180 TABLET | Refills: 3 | Status: SHIPPED | OUTPATIENT
Start: 2025-06-02

## 2025-06-02 RX ORDER — ROSUVASTATIN CALCIUM 10 MG/1
10 TABLET, COATED ORAL DAILY
Qty: 90 TABLET | Refills: 3 | Status: SHIPPED | OUTPATIENT
Start: 2025-06-02

## 2025-06-02 RX ADMIN — DOXYCYCLINE HYCLATE 200 MG: 100 CAPSULE ORAL at 11:15

## 2025-06-02 ASSESSMENT — COLUMBIA-SUICIDE SEVERITY RATING SCALE - C-SSRS
2. HAVE YOU ACTUALLY HAD ANY THOUGHTS OF KILLING YOURSELF IN THE PAST MONTH?: NO
1. IN THE PAST MONTH, HAVE YOU WISHED YOU WERE DEAD OR WISHED YOU COULD GO TO SLEEP AND NOT WAKE UP?: NO
6. HAVE YOU EVER DONE ANYTHING, STARTED TO DO ANYTHING, OR PREPARED TO DO ANYTHING TO END YOUR LIFE?: NO

## 2025-06-02 ASSESSMENT — ACTIVITIES OF DAILY LIVING (ADL)
ADLS_ACUITY_SCORE: 41
ADLS_ACUITY_SCORE: 41

## 2025-06-02 NOTE — CONFIDENTIAL NOTE
"Received call transferred to Triage for patient regarding palpitations.     Patient states late last week she started having occasional palpitations that would occur once in a while. Since yesterday and now today they have been more constant and intense. The palpitations occur every couple minutes. Patient states she has been also having concurrent stomach and digestive issues. She has felt achy. No current chest pain, dizziness, lightheadedness, SOB, or weakness.  Patient's current heart rate is 72 bpm. She states the last few days it has been in the 70's. She states her blood pressure has been \"normal.\"  Patient has H/O A-Fib. She states she is taking all medications as prescribed.     Patient was advised to go to an Urgent Care for further evaluation of her palpitations/heart rhythm and symptoms. Patient verbalized understanding and agreement.   Routing to inform Dr. Griffith's nurse.    1. DESCRIPTION: \"Please describe your heart rate or heartbeat that you are having\" (e.g., fast/slow, regular/irregular, skipped or extra beats, \"palpitations\") Palpitations.  2. ONSET: \"When did it start?\" (e.g., minutes, hours, days) Late last week, occurring more since yesterday.  3. DURATION: \"How long does it last\" (e.g., seconds, minutes, hours)   4. PATTERN \"Does it come and go, or has it been constant since it started?\" \"Does it get worse with exertion?\" \"Are you feeling it now?\" Occurs every couple minutes.   5. TAP: \"Using your hand, can you tap out what you are feeling on a chair or table in front of you, so that I can hear?\" Note: Not all patients can do this.  6. HEART RATE: \"Can you tell me your heart rate?\" \"How many beats in 15 seconds?\" Note: Not all patients can do this. 72 bpm.  7. RECURRENT SYMPTOM: \"Have you ever had this before?\" If Yes, ask: \"When was the last time?\" and \"What happened that time?\" H/O A-Fib.  8. CAUSE: \"What do you think is causing the palpitations?\" Cardiac or stomach issues.  9. CARDIAC " "HISTORY: \"Do you have any history of heart disease?\" (e.g., heart attack, angina, bypass surgery, angioplasty, arrhythmia) A-Fib.  10. OTHER SYMPTOMS: \"Do you have any other symptoms?\" (e.g., dizziness, chest pain, sweating, difficulty breathing) Digestive issues.  11. PREGNANCY: \"Is there any chance you are pregnant?\" \"When was your last menstrual period?\"  "

## 2025-06-02 NOTE — ED TRIAGE NOTES
Patient reports intermittent palpitations for about 5 days.  Denies changes to cardiac medications.  ABCs intact, A&Ox4     Triage Assessment (Adult)       Row Name 06/02/25 0927          Triage Assessment    Airway WDL WDL        Respiratory WDL    Respiratory WDL WDL        Skin Circulation/Temperature WDL    Skin Circulation/Temperature WDL WDL        Cardiac WDL    Cardiac WDL X        Peripheral/Neurovascular WDL    Peripheral Neurovascular WDL WDL        Cognitive/Neuro/Behavioral WDL    Cognitive/Neuro/Behavioral WDL WDL

## 2025-06-02 NOTE — ED PROVIDER NOTES
Emergency Department Note      History of Present Illness     Chief Complaint   Palpitations      HPI   Felisha Gorman is a 65 year old female, anticoagulated on Eliquis, with a history of paroxysmal atrial fibrillation, hypertension, anxiety, asthma, and hypertrophic cardiomyopathy amongst others presenting to the ED with palpitations. The patient reports experiencing intermittent palpitations for the past 4-5 days. She reports the palpitations present sporadically, gradually increasing in frequency to the point they are have presented every few minutes over the past day. She describes that the palpitations last for seconds to minutes at a time and describes the sensations being localized to the center of her chest. She adds that yesterday she was having body aches and malaise. She further endorses nausea, and abdominal pain/bloating - especially after eating. She endorses constipation though notes this is chronic for her, her last normal bowel movement was yesterday. She denies any chills, cough, shortness of breath, vomiting, diarrhea, dysuria, lightheadedness, or dizziness. Of note she hasn't felt these palpitations for the past two years since having quit caffeine. Of note, she reports that she believed she found a tick on her scalp yesterday and was at an outdoor wedding in [place] five days ago.    Independent Historian   None    Review of External Notes   Reviewed 07/10/2024 ED note. Patient was seen for atrial fibrillation and was started on Eliquis at that time.    Past Medical History     Medical History and Problem List   Benign neoplasm of ascending colon  Gastroesophageal reflux disease without esophagitis   IBS  Hemorrhage of rectum and anus  Dysthymic disorder  Centrilobular emphysema   Asthma  Nonallergic rhinitis   Paroxysmal atrial fibrillation  Chronic cough  Obstructive sleep apnea  Major depression in complete remission  Hypertension  Hyperlipidemia  Hypertrophic  "cardiomyopathy  Osteoarthritis, knee       Medications   Oxymetazoline  Albuterol  Arnuity Ellipta  Flonase  Spiriva respimat  Xanax  Eliquis  Dilt  Prozac  Cozaar  Prilosec  Inderal  Crestor      Surgical History   Abdominoplasty  Colonoscopy  Endometrial ablation  Endoscopy polypectomy nasal  Mammoplasty reduction  Tonsillectomy     Physical Exam     Patient Vitals for the past 24 hrs:   BP Temp Temp src Pulse Resp SpO2 Height Weight   06/02/25 1146 136/70 -- -- 57 -- 93 % -- --   06/02/25 1040 (!) 140/68 -- -- 66 10 93 % -- --   06/02/25 0929 132/81 98  F (36.7  C) Temporal 79 16 96 % 1.753 m (5' 9\") 94.6 kg (208 lb 8.9 oz)     Physical Exam  Physical Exam:  GENERAL: Warm, dry, alert, no increased work of breathing, nontoxic appearing  HEENT: PERRLA, EOMs intact, clear conjunctiva  NECK: No JVD, supple without lymphadenopathy.  No stiffness or restricted range of motion  HEART: Regular rate and rhythm, no murmur  LUNGS: CTAB, moving air well.  No crackles or wheezes are heard.  ABD: Soft, nontender, nondistended, no guarding  BACK: No CVAT, no obvious deformities  EXTREMITIES: Moves all extremities without difficulty, no calf tenderness or peripheral edema.  SKIN: Warm and dry without rash or lesions.  NEUROLOGICAL: No focal deficits.    PSYCH: Appropriate mood and affect.      Diagnostics     Lab Results   Labs Ordered and Resulted from Time of ED Arrival to Time of ED Departure   COMPREHENSIVE METABOLIC PANEL - Abnormal       Result Value    Sodium 139      Potassium 3.6      Carbon Dioxide (CO2) 23      Anion Gap 14      Urea Nitrogen 14.1      Creatinine 0.83      GFR Estimate 77      Calcium 8.9      Chloride 102      Glucose 109 (*)     Alkaline Phosphatase 88      AST 18      ALT 31      Protein Total 7.1      Albumin 4.3      Bilirubin Total 0.5     ROUTINE UA WITH MICROSCOPIC REFLEX TO CULTURE - Abnormal    Color Urine Yellow      Appearance Urine Slightly Cloudy (*)     Glucose Urine Negative      " Bilirubin Urine Negative      Ketones Urine Negative      Specific Gravity Urine 1.017      Blood Urine Trace (*)     pH Urine 5.5      Protein Albumin Urine Negative      Urobilinogen Urine Normal      Nitrite Urine Negative      Leukocyte Esterase Urine Large (*)     Bacteria Urine Few (*)     Mucus Urine Present (*)     RBC Urine 18 (*)     WBC Urine 34 (*)     Squamous Epithelials Urine 10 (*)    TROPONIN T, HIGH SENSITIVITY - Normal    Troponin T, High Sensitivity 6     LIPASE - Normal    Lipase 22     TSH WITH FREE T4 REFLEX - Normal    TSH 2.48     MAGNESIUM - Normal    Magnesium 1.9     CBC WITH PLATELETS AND DIFFERENTIAL    WBC Count 9.9      RBC Count 4.99      Hemoglobin 14.9      Hematocrit 43.8      MCV 88      MCH 29.9      MCHC 34.0      RDW 13.1      Platelet Count 331      % Neutrophils 62      % Lymphocytes 25      % Monocytes 10      % Eosinophils 3      % Basophils 1      % Immature Granulocytes 0      NRBCs per 100 WBC 0      Absolute Neutrophils 6.1      Absolute Lymphocytes 2.4      Absolute Monocytes 1.0      Absolute Eosinophils 0.3      Absolute Basophils 0.1      Absolute Immature Granulocytes 0.0      Absolute NRBCs 0.0     LYME DISEASE TOTAL ANTIBODIES WITH REFLEX TO CONFIRMATION   URINE CULTURE       Imaging   XR Chest 2 Views   Final Result   IMPRESSION: No acute cardiopulmonary process.          EKG   ECG results from 06/02/25   EKG 12-lead, tracing only     Value    Systolic Blood Pressure     Diastolic Blood Pressure     Ventricular Rate 80    Atrial Rate 80    NM Interval 168    QRS Duration 96        QTc 470    P Axis 48    R AXIS -51    T Axis 59    Interpretation ECG      Sinus rhythm with occasional Premature ventricular complexes  Low voltage QRS  Left anterior fascicular block  Possible Anterolateral infarct (cited on or before 08-Sep-2024)  Abnormal ECG         Independent Interpretation   None    ED Course      Medications Administered   Medications   doxycycline  hyclate (VIBRAMYCIN) capsule 200 mg (200 mg Oral $Given 6/2/25 1115)       Procedures   Procedures     Discussion of Management   None    ED Course   ED Course as of 06/02/25 1334   Mon Jun 02, 2025   0965 I obtained history and examined the patient as noted above.   0956 I rechecked the patient after she had reported a recent tick bite to her nurse.        Additional Documentation  None    Medical Decision Making / Diagnosis     CMS Diagnoses: None    MIPS   None               MDM   Felisha Gorman is a 65 year old female , anticoagulated on Eliquis, with a history of paroxysmal atrial fibrillation, hypertension, anxiety, asthma, and hypertrophic cardiomyopathy amongst others presenting to the ED with palpitations.  Differential considered includes but is not limited to paroxysmal arrhythmia, ACS, viral illness, thyroid storm, and early Lyme among many others.  Vitals reassuring without hypoxia or fever.  On physical exam, no acute respiratory distress, patient nontoxic-appearing.  Patient did have mild erythema in a circular pattern approximately 2 cm in the right occipital scalp the area where she was concerned she had a tick attached.    ECG reassuring without ischemic changes, normal intervals.  Patient did have 1 PVC noted on the ECG.  Troponin was 6, doubt ACS/myocarditis/pericarditis today.  TSH was 2.48, no signs of thyroid storm.  There was no leukocytosis, no anemia, and no significant metabolic derangements.  Lyme disease antibodies is pending.  Will provide patient with 1 prophylactic dose of doxycycline due to potential tick exposure.  Patient's urinalysis did have pyuria and leukocyte esterase however there was a significant amount of squamous epithelium confounding results, will await urine culture as patient has not had urinary symptoms such as dysuria or increased urinary frequency or urgency.  I suspect patient has been feeling her PVCs, she remained on continuous cardiac monitoring during her  stay in the ED with no other arrhythmia such as atrial fibrillation.  Patient follows with [facility] although her own cardiologist recently retired and she has not yet establish care with a new cardiologist.  I will place an urgent referral to cardiology for follow-up appointment.  Given overall reassuring workup today, patient is safe for outpatient management with close follow-up with cardiology.  Discussed findings and recommendations with patient, she agreed.  Return precautions include chest pain, shortness of breath among others.    Disposition   The patient was discharged.     Diagnosis     ICD-10-CM    1. Palpitations  R00.2 Adult Cardiology Eval  Referral      2. Tick bite of scalp, initial encounter  S00.06XA     W57.XXXA            Discharge Medications   Discharge Medication List as of 6/2/2025 11:49 AM        Scribe Disclosure:  I, JASON KOO, am serving as a scribe at 11:03 AM on 6/2/2025 to document services personally performed by Violetta Dubon MD based on my observations and the provider's statements to me.        Adeel Griffiths PA-C  06/02/25 [id number]

## 2025-06-02 NOTE — DISCHARGE INSTRUCTIONS
You can take metoprolol 25 mg daily to see if this helps. Hold if dizzy or BP low.  I have placed a referral to cardiology, they should be calling you within the next several days.  If you do not hear from them within the next 2 days, please call your cardiology office for follow-up.  Please return to the emergency department if you have chest pain, shortness of breath, or if you have any other concerns.      Discharge Instructions  Palpitations    Palpitations are an unusual awareness of your heartbeat. People often describe this as the heart skipping, fluttering, racing, irregular, or pounding. At this time, your provider has found no signs that your palpitations are due to a serious or life-threatening condition. However, sometimes there is a serious problem that does not show up right away.    Palpitations can be caused by caffeine, cigarettes, diet pills, energy drinks or supplements, other stimulants, and medications and street drugs. They can also be caused by anxiety, hormone conditions such as high thyroid, and other medical conditions. Sometimes they are a sign of abnormal rhythm in the heart. At this time, your provider did not find any dangerous cause of your symptoms.    Generally, every Emergency Department visit should have a follow-up clinic visit with either a primary or a specialty clinic/provider. Please follow-up as instructed by your emergency provider today.    Return to the Emergency Department if:  You get chest pain or tightness.  You are short of breath.  You get very weak or tired.  You pass out or faint.  Your heart rate is over 120 beats per minute for more than 10 minutes while you are resting.   You have anything else that worries you.    What can I do to help myself?  Fill any prescriptions the provider gave you and take them right away.   Follow your provider s instructions about the prescription medicines you are on. Sometimes the provider may tell you to stop taking a medicine or  change the dose.  If you smoke, this may be a good time to quit! The less you can smoke, the better.  Do not use energy drinks, diet pills, or stimulants. Limit your use of caffeine.  If you were given a prescription for medicine here today, be sure to read all of the information (including the package insert) that comes with your prescription.  This will include important information about the medicine, its side effects, and any warnings that you need to know about.  The pharmacist who fills the prescription can provide more information and answer questions you may have about the medicine.  If you have questions or concerns that the pharmacist cannot address, please call or return to the Emergency Department.     Remember that you can always come back to the Emergency Department if you are not able to see your regular provider in the amount of time listed above, if you get any new symptoms, or if there is anything that worries you.     Discharge Instructions  Tick Exposure    You have been seen today because of an exposure to a tick.  Some ticks can cause disease.  Deer ticks can carry the organism that causes Lyme disease.  Deer ticks are very small, about the size of a poppy seed before they feed. Dog ticks (or Wood ticks), which are larger and often have a white collar, do not cause Lyme disease.  In order to transmit disease, a tick must feed for more than 36 hours.  This means a tick walking across your skin cannot cause disease nor can a tick you remove right away.    Generally, every Emergency Department visit should have a follow-up clinic visit with either a primary or a specialty clinic/provider. Please follow-up as instructed by your emergency provider today.    Tick Removal - You can remove ticks at home.  Here are some tips to help with tick removal:  Use tweezers and pull straight out, do not jerk or twist.  If small pieces of the tick are left, leave them alone and they will usually work their way  out.  DO NOT use Vaseline  (petrolatum topical), kerosene, a hot match, a cigarette, nail polish, because they can cause the tick to inject their bodily fluids into the wound.  Wash skin and hands with soap and water after tick removal.    Preventing tick bites:  Wear shoes, long-sleeved shirts, and long pants when you go outside. Keep ticks away from your skin by tucking your pants into your socks.  Wear light colors so you can spot any ticks that get on your clothes.  Use bug spray to keep ticks away.  Check your body for ticks after being outdoors. Be sure to check your scalp, waist, armpits, groin, and backs of your knees. Check your children, too.   If you live in a place that has deer or mice nearby, take steps to keep those animals away. Deer and mice carry ticks.   Most tick exposures do not require antibiotics.  You were only prescribed antibiotics if the circumstances of your exposure were thought to be very high risk, for example you found an attached engorged tick that may have been unnoticed for more than 24 hours.    Watch for signs of illness such as:  Fever.  Rash around site of the tick exposure.  Flu like symptoms such as feeling tired or weak.  Muscle and joint aches.  Headache and stiff neck.    If any of these symptoms develop from a few days to a month after tick exposure, you can be prescribed antibiotics at that time.  If you were given a prescription for medicine here today, be sure to read all of the information (including the package insert) that comes with your prescription.  This will include important information about the medicine, its side effects, and any warnings that you need to know about.  The pharmacist who fills the prescription can provide more information and answer questions you may have about the medicine.  If you have questions or concerns that the pharmacist cannot address, please call or return to the Emergency Department.       Remember that you can always come back to  the Emergency Department if you are not able to see your regular provider in the amount of time listed above, if you get any new symptoms, or if there is anything that worries you.

## 2025-06-02 NOTE — ED PROVIDER NOTES
ED APC SUPERVISION NOTE:   I evaluated this patient in conjunction with ROMEL  I have participated in the care of the patient and personally performed key elements of the history, exam, and medical decision making.      HPI:   Felisha Gorman is a 65 year old female with history of atrial fibrillation and hypertrophic cardiomyopathy who presents to the ED for evaluation of palpitations. The patient reports that she has had intermittent palpitations over the past 5 days which have increasing in frequency. She states that her palpitations are mid sternal and only last a few seconds at a time. She endorses nausea and abdomen pain with bloating which is exacerbated after eating. She denies any chills, cough, shortness of breath, vomiting, diarrhea, dysuria, lightheadedness, or dizziness.       Independent Historian:   None    Review of External Notes:   Reviewed 07/10/2024 ED note. Patient was seen for atrial fibrillation and was started on Eliquis at that time.      EXAM:   GENERAL: Awake, alert  ENT: Punctate area on right parietal scalp with scab but no surrounding or warmth  CARDIOVASCULAR: Regular rate and rhythm  LUNGS: Clear bilaterally, no wheezes rales or rhonchi  ABDOMEN: Soft, nontender, no rebound or guarding  EXTREMITIES: No peripheral edema  Skin: No rash   NEURO: Awake and alert, moves all extremities    Independent Interpretation (X-rays, CTs, rhythm strip):  Chest x-ray: No infiltrate    Consultations/Discussion of Management or Tests:  None     MIPS:      None    MEDICAL DECISION MAKING/ASSESSMENT AND PLAN:   65-year-old female who presents emergency department for evaluation of intermittent palpitations.  While I was in the room, patient reported that she had palpitations, was noted to have a PVC on the monitor.  This was also seen on EKG.  Patient had normal CBC BMP troponin, TSH, and magnesium.  Urinalysis was performed but patient denies any urinary complaints, this appears to be a contaminated  sample and symptoms not consistent with UTI at this point, no need for treatment.  Patient denies any caffeine use, she was counseled that PVCs are benign, she does have some metoprolol at home and she can try to take 25 mg of this per day to see if this helps.  Can follow with her cardiologist.  Also was concerned about possible tick bite, will give prophylactic doxycycline, no rash to suggest Lyme but we will send a screening     DIAGNOSIS:     ICD-10-CM    1. Palpitations  R00.2 Adult Cardiology Eval  Referral      2. Tick bite of scalp, initial encounter  S00.06XA     W57.XXXA         Scribe Disclosure:  I, Adeel Reis, am serving as a scribe at 12:46 PM on 6/2/2025 to document services personally performed by No att. providers found based on my observations and the provider's statements to me.   6/2/2025  [facility] EMERGENCY DEPT     Violetta Dubon MD  06/02/25 8006

## 2025-06-02 NOTE — TELEPHONE ENCOUNTER
Caller reporting the following red-flag symptom(s): Heart palpitation     Per the system red-flag symptom policy, patient was instructed to:  speak with a Registered Nurse    Action:  Patient warm transferred to a Registered Nurse

## 2025-06-03 ENCOUNTER — RESULTS FOLLOW-UP (OUTPATIENT)
Dept: NURSING | Facility: CLINIC | Age: 65
End: 2025-06-03

## 2025-06-03 LAB — BACTERIA UR CULT: NORMAL

## 2025-06-10 ENCOUNTER — OFFICE VISIT (OUTPATIENT)
Dept: CARDIOLOGY | Facility: CLINIC | Age: 65
End: 2025-06-10
Payer: COMMERCIAL

## 2025-06-10 VITALS
DIASTOLIC BLOOD PRESSURE: 70 MMHG | HEIGHT: 68 IN | WEIGHT: 213.3 LBS | SYSTOLIC BLOOD PRESSURE: 124 MMHG | BODY MASS INDEX: 32.33 KG/M2 | HEART RATE: 62 BPM

## 2025-06-10 DIAGNOSIS — R00.2 PALPITATIONS: ICD-10-CM

## 2025-06-10 DIAGNOSIS — K59.00 CONSTIPATION, UNSPECIFIED CONSTIPATION TYPE: ICD-10-CM

## 2025-06-10 DIAGNOSIS — K21.00 GASTROESOPHAGEAL REFLUX DISEASE WITH ESOPHAGITIS, UNSPECIFIED WHETHER HEMORRHAGE: Primary | ICD-10-CM

## 2025-06-10 DIAGNOSIS — R10.84 ABDOMINAL PAIN, GENERALIZED: ICD-10-CM

## 2025-06-10 RX ORDER — PROPRANOLOL HYDROCHLORIDE 10 MG/1
10-20 TABLET ORAL DAILY PRN
Qty: 10 TABLET | Refills: 2 | Status: SHIPPED | OUTPATIENT
Start: 2025-06-10

## 2025-06-10 NOTE — LETTER
6/10/2025    Mary Rodas MD  [address] [facility] [address]    RE: Felisha Gorman       Dear Colleague,     I had the pleasure of seeing Felisha Gorman in the [facility].  HPI and Plan:   Felisha Gorman is a 65 year old female who presents with history of asthma, COPD, hypertension, palpitations with episodes of paroxysmal atrial fibrillation last July.  She had some recurrence of palpitation symptoms recently and was seen in the emergency department for this last week.  She has been prescribed as needed propranolol for this by Dr. Freeman in the past who is her previous cardiologist.  She had not had symptoms in so long she forgot to use this.  She does also correlate the onset of her symptoms with some abdominal fullness discomfort.  She fasted for a day and some of her GI symptoms started to resolve as well as her palpitation symptoms.  In reviewing workup from the ED she had a normal basic metabolic panel, normal thyroid and liver function test and normal CBC.  She had a stress test last October which was normal  I reviewed her ECG from the second which shows a normal sinus rhythm without acute ST or T wave changes and occasional PVC    Summary    1.  Palpitations-this is resolved along with her GI symptoms which seem to have correlated with the onset of symptoms.  She was seen in the ED and there is no evidence for recurrence of atrial fibrillation, some ventricular ectopy was noted.  I renewed her prescription for propranolol today and advised her that if her symptoms recur and become frequent we should have her wear a heart monitor to assess for recurrence of atrial fibrillation.  She continues to avoid caffeine and alcohol.    2.  Paroxysmal atrial fibrillation-diagnosed in July 2024 and underwent cardioversion and has not had a known reoccurrence since.  She is maintained on apixaban for CVA prophylaxis    Please feel free to contact me with any questions given regards  [facility]    Progress Note    Arpit Caballero Patient Status:  Inpatient    1971 MRN [id number]   Location [facility] 2W/SW Attending Steph Velásquez DO   Hosp Day # 23 PCP Ranjan May DO       SUBJECTIVE:  No acute events overn --   6.4  5.5*   --          Imaging:  Xr Chest Ap/pa (1 View) (cpt=71010)    Result Date: 12/12/2017  CONCLUSION:   Weighted feeding tube tip projects over the left upper quadrant, likely intragastric/prepyloric. There remain prominent vascular markings. to her care       Today's clinic visit entailed:  Review of external notes as documented elsewhere in note  Review of the result(s) of each unique test - BMP, CBC TSH, ALT, stress echo  Prescription drug management    Provider  Link to [facility] Help Grid     The level of medical decision making during this visit was of moderate complexity.    Orders Placed This Encounter   Procedures     Adult GI  Referral - Consult Only     Orders Placed This Encounter   Medications     propranolol (INDERAL) 10 MG tablet     Sig: Take 1-2 tablets (10-20 mg) by mouth daily as needed (palpitations).     Dispense:  10 tablet     Refill:  2     Medications Discontinued During This Encounter   Medication Reason     propranolol (INDERAL) 10 MG tablet Reorder (No AVS)         Encounter Diagnoses   Name Primary?     Palpitations      Palpitations      Gastroesophageal reflux disease with esophagitis, unspecified whether hemorrhage Yes     Abdominal pain, generalized      Constipation, unspecified constipation type        CURRENT MEDICATIONS:  Current Outpatient Medications   Medication Sig Dispense Refill     albuterol (PROAIR HFA/PROVENTIL HFA/VENTOLIN HFA) 108 (90 Base) MCG/ACT inhaler Inhale 2 puffs into the lungs every 6 hours as needed for shortness of breath, wheezing or cough. 18 g 2     albuterol (VENTOLIN HFA) 108 (90 Base) MCG/ACT inhaler INHALE 2 PUFFS INTO THE LUNGS EVERY 6 HOURS AS NEEDED FOR SHORTNESS OF BREATH OR WHEEZING OR COUGH 18 g 1     ALPRAZolam (XANAX) 0.5 MG tablet Take 1 tablet (0.5 mg) by mouth 3 times daily as needed for anxiety 20 tablet 0     apixaban ANTICOAGULANT (ELIQUIS ANTICOAGULANT) 5 MG tablet TAKE 1 TABLET(5 MG) BY MOUTH TWICE DAILY 60 tablet 4     ARNUITY ELLIPTA 100 MCG/ACT inhaler Inhale 1 puff into the lungs every morning. 60 each 3     cetirizine (ZYRTEC) 10 MG tablet Take 10 mg by mouth as needed.       diltiazem ER (DILT-XR) 240 MG 24 hr ER beaded capsule Take 1 capsule (240 mg) by mouth  every morning 90 capsule 4     FLUoxetine (PROZAC) 10 MG capsule Take 1 capsule (10 mg) by mouth daily (Patient taking differently: Take 10 mg by mouth as needed.) 90 capsule 1     losartan (COZAAR) 25 MG tablet Take 1 tablet (25 mg) by mouth 2 times daily. 180 tablet 3     omeprazole (PRILOSEC) 20 MG DR capsule Take 1 capsule (20 mg) by mouth daily (Patient taking differently: Take 20 mg by mouth as needed.) 90 capsule 2     propranolol (INDERAL) 10 MG tablet Take 1-2 tablets (10-20 mg) by mouth daily as needed (palpitations). 10 tablet 2     rosuvastatin (CRESTOR) 10 MG tablet Take 1 tablet (10 mg) by mouth daily. 90 tablet 3     predniSONE (DELTASONE) 20 MG tablet Take two tablets (= 40mg) each day for 5 (five) days 10 tablet 0     tiotropium (SPIRIVA RESPIMAT) 2.5 MCG/ACT inhaler Inhale 2 puffs into the lungs daily.         ALLERGIES     Allergies   Allergen Reactions     Atorvastatin      upset     Penicillin [Penicillins] Rash       PAST MEDICAL HISTORY:  Past Medical History:   Diagnosis Date     Dysthymic disorder      Essential hypertension, benign      HSV (herpes simplex virus) anogenital infection      Hyperlipidemia      Hypertrophic cardiomyopathy      Mild intermittent asthma     URI induced     Obstructive sleep apnea     started cpap 5/2024     Palpitations - PVC and PVC bigmeny      Paroxysmal atrial fibrillation      Rheumatic fever, history of     no sequelae       PAST SURGICAL HISTORY:  Past Surgical History:   Procedure Laterality Date     ABDOMINOPLASTY  2017     COLONOSCOPY  07/2009    hyperplastic polyp; repeat 10 years     COLONOSCOPY  12/2020    repeat 5 years; tubular adenoma     ENDOMETRIAL ABLATION  2006     ENDOSCOPIC POLYPECTOMY NASAL  2002     MAMMOPLASTY REDUCTION Bilateral 2017     TONSILLECTOMY         FAMILY HISTORY:  Family History   Problem Relation Age of Onset     Ovarian Cancer Mother 60     C.A.D. Mother      Heart Disease Mother      Cancer Mother         ovarian OK now  prominent markings though no gross infiltrate; BCx 12/11 and 12/12 no growth to date; procalcitonin elevated at 2.38; lactate normal at 0.7; CT abdomen and pelvis with IV contrast without obvious source of infection.      Hyponatremia  Await labs today.     Dementia Mother      C.A.D. Father         MI     Heart Disease Father      C.A.D. Brother         age 50     Heart Disease Brother      Breast Cancer No family hx of        SOCIAL HISTORY:  Social History     Socioeconomic History     Marital status:      Spouse name: Dorian     Number of children: 1     Years of education: None     Highest education level: None   Occupational History     Occupation:       Employer: [employer]   Tobacco Use     Smoking status: Former     Current packs/day: 0.00     Average packs/day: 2.0 packs/day for 10.0 years (20.0 ttl pk-yrs)     Types: Cigarettes     Start date: 1976     Quit date: 1986     Years since quittin.4     Smokeless tobacco: Never   Vaping Use     Vaping status: Never Used   Substance and Sexual Activity     Alcohol use: Not Currently     Alcohol/week: 0.0 - 0.8 standard drinks of alcohol     Drug use: No     Sexual activity: Yes     Partners: Male     Birth control/protection: Condom   Other Topics Concern     Special Diet No     Comment: stress eating recently     Exercise No     Parent/sibling w/ CABG, MI or angioplasty before 65F 55M? Yes     Social Drivers of Health      Received from [facility]    Social Connections   Interpersonal Safety: Low Risk  (2024)    Interpersonal Safety      Do you feel physically and emotionally safe where you currently live?: Yes      Within the past 12 months, have you been hit, slapped, kicked or otherwise physically hurt by someone?: No      Within the past 12 months, have you been humiliated or emotionally abused in other ways by your partner or ex-partner?: No       Review of Systems:  Skin:  not assessed     Eyes:  not assessed    ENT:  not assessed    Respiratory:  Negative    Cardiovascular:    Positive for, palpitations, fatigue  Gastroenterology: not assessed    Genitourinary:  not assessed    Musculoskeletal:  not assessed  x5d; Hgb stable.  Transfuse if <7. No signs of bleeding.  Hgb stable  Repeat iron studies normal.     Vitamin B12 deficiency  Level 251.  s/p Vit B12 1000mcg IM x 1 on 12/10, and has started 1000 mcg po qd      DVT prophylaxis  On heparin 5000 U SQ BID, SC "   Neurologic:  not assessed    Psychiatric:  not assessed    Heme/Lymph/Imm:  not assessed    Endocrine:  not assessed      Physical Exam:  Vitals: /70 (BP Location: Right arm, Patient Position: Sitting, Cuff Size: Adult Large)   Pulse 62   Ht 1.727 m (5' 8\")   Wt 96.8 kg (213 lb 4.8 oz)   LMP 09/29/2006 (Exact Date)   BMI 32.43 kg/m      Constitutional:  cooperative, alert and oriented, well developed, well nourished, in no acute distress        Skin:  warm and dry to the touch          Head:  normocephalic        Eyes:  pupils equal and round        Lymph:      ENT:  no pallor or cyanosis        Neck:  no carotid bruit        Respiratory:  clear to auscultation         Cardiac: regular rhythm occasional premature beats           minimal if any syst murmur supine and standing  pulses full and equal                                        GI:  abdomen soft obese      Extremities and Muscular Skeletal:  no deformities, clubbing, cyanosis, erythema observed, no edema              Neurological:  no gross motor deficits        Psych:  Alert and Oriented x 3        Recent Lab Results:  LIPID RESULTS:  Lab Results   Component Value Date    CHOL 112 06/28/2024    CHOL 220 (H) 04/30/2021    HDL 43 (L) 06/28/2024    HDL 45 (L) 04/30/2021    LDL 39 06/28/2024     (H) 04/30/2021    TRIG 149 06/28/2024    TRIG 152 (H) 04/30/2021    CHOLHDLRATIO 5.5 (H) 03/25/2014       LIVER ENZYME RESULTS:  Lab Results   Component Value Date    AST 18 06/02/2025    AST 15 01/13/2020    ALT 31 06/02/2025    ALT 9 01/28/2020       CBC RESULTS:  Lab Results   Component Value Date    WBC 9.9 06/02/2025    WBC 12.1 (H) 01/01/2021    RBC 4.99 06/02/2025    RBC 4.86 01/01/2021    HGB 14.9 06/02/2025    HGB 14.7 01/01/2021    HCT 43.8 06/02/2025    HCT 45.5 01/01/2021    MCV 88 06/02/2025    MCV 94 01/01/2021    MCH 29.9 06/02/2025    MCH 30.2 01/01/2021    MCHC 34.0 06/02/2025    MCHC 32.3 01/01/2021    RDW 13.1 06/02/2025    RDW " 12.9 01/01/2021     06/02/2025     01/01/2021       BMP RESULTS:  Lab Results   Component Value Date     06/02/2025     01/01/2021    POTASSIUM 3.6 06/02/2025    POTASSIUM 3.5 07/23/2022    POTASSIUM 3.6 01/01/2021    CHLORIDE 102 06/02/2025    CHLORIDE 109 07/23/2022    CHLORIDE 109 01/01/2021    CO2 23 06/02/2025    CO2 24 07/23/2022    CO2 25 01/01/2021    ANIONGAP 14 06/02/2025    ANIONGAP 5 07/23/2022    ANIONGAP 7 01/01/2021     (H) 06/02/2025     (H) 07/23/2022    GLC 84 04/30/2021    BUN 14.1 06/02/2025    BUN 25 07/23/2022    BUN 21 01/01/2021    CR 0.83 06/02/2025    CR 0.76 01/01/2021    GFRESTIMATED 77 06/02/2025    GFRESTIMATED 83 01/01/2021    GFRESTBLACK >90 01/01/2021    JACK 8.9 06/02/2025    JACK 8.9 01/01/2021        A1C RESULTS:  Lab Results   Component Value Date    A1C 5.2 04/30/2021       INR RESULTS:  Lab Results   Component Value Date    INR 1.09 06/16/2005           CC  Adeel Griffiths PA-C  [facility]  [address]  [place] [address]                  Thank you for allowing me to participate in the care of your patient.      Sincerely,     Maranda Mensah DO     [facility]  cc:   Adeel Griffiths PA-C  [facility]  [address]  [place] [address]

## 2025-06-10 NOTE — PROGRESS NOTES
HPI and Plan:   Felisha Gorman is a 65 year old female who presents with history of asthma, COPD, hypertension, palpitations with episodes of paroxysmal atrial fibrillation last July.  She had some recurrence of palpitation symptoms recently and was seen in the emergency department for this last week.  She has been prescribed as needed propranolol for this by Dr. Freeman in the past who is her previous cardiologist.  She had not had symptoms in so long she forgot to use this.  She does also correlate the onset of her symptoms with some abdominal fullness discomfort.  She fasted for a day and some of her GI symptoms started to resolve as well as her palpitation symptoms.  In reviewing workup from the ED she had a normal basic metabolic panel, normal thyroid and liver function test and normal CBC.  She had a stress test last October which was normal  I reviewed her ECG from the second which shows a normal sinus rhythm without acute ST or T wave changes and occasional PVC    Summary    1.  Palpitations-this is resolved along with her GI symptoms which seem to have correlated with the onset of symptoms.  She was seen in the ED and there is no evidence for recurrence of atrial fibrillation, some ventricular ectopy was noted.  I renewed her prescription for propranolol today and advised her that if her symptoms recur and become frequent we should have her wear a heart monitor to assess for recurrence of atrial fibrillation.  She continues to avoid caffeine and alcohol.    2.  Paroxysmal atrial fibrillation-diagnosed in July 2024 and underwent cardioversion and has not had a known reoccurrence since.  She is maintained on apixaban for CVA prophylaxis    Please feel free to contact me with any questions given regards to her care       Today's clinic visit entailed:  Review of external notes as documented elsewhere in note  Review of the result(s) of each unique test - BMP, CBC TSH, ALT, stress echo  Prescription drug  management    Provider  Link to [facility] Help Grid     The level of medical decision making during this visit was of moderate complexity.    Orders Placed This Encounter   Procedures    Adult GI  Referral - Consult Only     Orders Placed This Encounter   Medications    propranolol (INDERAL) 10 MG tablet     Sig: Take 1-2 tablets (10-20 mg) by mouth daily as needed (palpitations).     Dispense:  10 tablet     Refill:  2     Medications Discontinued During This Encounter   Medication Reason    propranolol (INDERAL) 10 MG tablet Reorder (No AVS)         Encounter Diagnoses   Name Primary?    Palpitations     Palpitations     Gastroesophageal reflux disease with esophagitis, unspecified whether hemorrhage Yes    Abdominal pain, generalized     Constipation, unspecified constipation type        CURRENT MEDICATIONS:  Current Outpatient Medications   Medication Sig Dispense Refill    albuterol (PROAIR HFA/PROVENTIL HFA/VENTOLIN HFA) 108 (90 Base) MCG/ACT inhaler Inhale 2 puffs into the lungs every 6 hours as needed for shortness of breath, wheezing or cough. 18 g 2    albuterol (VENTOLIN HFA) 108 (90 Base) MCG/ACT inhaler INHALE 2 PUFFS INTO THE LUNGS EVERY 6 HOURS AS NEEDED FOR SHORTNESS OF BREATH OR WHEEZING OR COUGH 18 g 1    ALPRAZolam (XANAX) 0.5 MG tablet Take 1 tablet (0.5 mg) by mouth 3 times daily as needed for anxiety 20 tablet 0    apixaban ANTICOAGULANT (ELIQUIS ANTICOAGULANT) 5 MG tablet TAKE 1 TABLET(5 MG) BY MOUTH TWICE DAILY 60 tablet 4    ARNUITY ELLIPTA 100 MCG/ACT inhaler Inhale 1 puff into the lungs every morning. 60 each 3    cetirizine (ZYRTEC) 10 MG tablet Take 10 mg by mouth as needed.      diltiazem ER (DILT-XR) 240 MG 24 hr ER beaded capsule Take 1 capsule (240 mg) by mouth every morning 90 capsule 4    FLUoxetine (PROZAC) 10 MG capsule Take 1 capsule (10 mg) by mouth daily (Patient taking differently: Take 10 mg by mouth as needed.) 90 capsule 1    losartan (COZAAR) 25 MG tablet Take 1  tablet (25 mg) by mouth 2 times daily. 180 tablet 3    omeprazole (PRILOSEC) 20 MG DR capsule Take 1 capsule (20 mg) by mouth daily (Patient taking differently: Take 20 mg by mouth as needed.) 90 capsule 2    propranolol (INDERAL) 10 MG tablet Take 1-2 tablets (10-20 mg) by mouth daily as needed (palpitations). 10 tablet 2    rosuvastatin (CRESTOR) 10 MG tablet Take 1 tablet (10 mg) by mouth daily. 90 tablet 3    predniSONE (DELTASONE) 20 MG tablet Take two tablets (= 40mg) each day for 5 (five) days 10 tablet 0    tiotropium (SPIRIVA RESPIMAT) 2.5 MCG/ACT inhaler Inhale 2 puffs into the lungs daily.         ALLERGIES     Allergies   Allergen Reactions    Atorvastatin      upset    Penicillin [Penicillins] Rash       PAST MEDICAL HISTORY:  Past Medical History:   Diagnosis Date    Dysthymic disorder     Essential hypertension, benign     HSV (herpes simplex virus) anogenital infection     Hyperlipidemia     Hypertrophic cardiomyopathy     Mild intermittent asthma     URI induced    Obstructive sleep apnea     started cpap 5/2024    Palpitations - PVC and PVC bigmeny     Paroxysmal atrial fibrillation     Rheumatic fever, history of     no sequelae       PAST SURGICAL HISTORY:  Past Surgical History:   Procedure Laterality Date    ABDOMINOPLASTY  2017    COLONOSCOPY  07/2009    hyperplastic polyp; repeat 10 years    COLONOSCOPY  12/2020    repeat 5 years; tubular adenoma    ENDOMETRIAL ABLATION  2006    ENDOSCOPIC POLYPECTOMY NASAL  2002    MAMMOPLASTY REDUCTION Bilateral 2017    TONSILLECTOMY         FAMILY HISTORY:  Family History   Problem Relation Age of Onset    Ovarian Cancer Mother 60    C.A.D. Mother     Heart Disease Mother     Cancer Mother         ovarian OK now    Dementia Mother     C.A.D. Father         MI    Heart Disease Father     C.A.D. Brother         age 50    Heart Disease Brother     Breast Cancer No family hx of        SOCIAL HISTORY:  Social History     Socioeconomic History    Marital status:  "     Spouse name: Dorian    Number of children: 1    Years of education: None    Highest education level: None   Occupational History    Occupation:       Employer: [employer]   Tobacco Use    Smoking status: Former     Current packs/day: 0.00     Average packs/day: 2.0 packs/day for 10.0 years (20.0 ttl pk-yrs)     Types: Cigarettes     Start date: 1976     Quit date: 1986     Years since quittin.4    Smokeless tobacco: Never   Vaping Use    Vaping status: Never Used   Substance and Sexual Activity    Alcohol use: Not Currently     Alcohol/week: 0.0 - 0.8 standard drinks of alcohol    Drug use: No    Sexual activity: Yes     Partners: Male     Birth control/protection: Condom   Other Topics Concern    Special Diet No     Comment: stress eating recently    Exercise No    Parent/sibling w/ CABG, MI or angioplasty before 65F 55M? Yes     Social Drivers of Health      Received from [employer] [facility]    Social Connections   Interpersonal Safety: Low Risk  (2024)    Interpersonal Safety     Do you feel physically and emotionally safe where you currently live?: Yes     Within the past 12 months, have you been hit, slapped, kicked or otherwise physically hurt by someone?: No     Within the past 12 months, have you been humiliated or emotionally abused in other ways by your partner or ex-partner?: No       Review of Systems:  Skin:  not assessed     Eyes:  not assessed    ENT:  not assessed    Respiratory:  Negative    Cardiovascular:    Positive for, palpitations, fatigue  Gastroenterology: not assessed    Genitourinary:  not assessed    Musculoskeletal:  not assessed    Neurologic:  not assessed    Psychiatric:  not assessed    Heme/Lymph/Imm:  not assessed    Endocrine:  not assessed      Physical Exam:  Vitals: /70 (BP Location: Right arm, Patient Position: Sitting, Cuff Size: Adult Large)   Pulse 62   Ht 1.727 m (5' 8\")   " Wt 96.8 kg (213 lb 4.8 oz)   LMP 09/29/2006 (Exact Date)   BMI 32.43 kg/m      Constitutional:  cooperative, alert and oriented, well developed, well nourished, in no acute distress        Skin:  warm and dry to the touch          Head:  normocephalic        Eyes:  pupils equal and round        Lymph:      ENT:  no pallor or cyanosis        Neck:  no carotid bruit        Respiratory:  clear to auscultation         Cardiac: regular rhythm occasional premature beats           minimal if any syst murmur supine and standing  pulses full and equal                                        GI:  abdomen soft obese      Extremities and Muscular Skeletal:  no deformities, clubbing, cyanosis, erythema observed, no edema              Neurological:  no gross motor deficits        Psych:  Alert and Oriented x 3        Recent Lab Results:  LIPID RESULTS:  Lab Results   Component Value Date    CHOL 112 06/28/2024    CHOL 220 (H) 04/30/2021    HDL 43 (L) 06/28/2024    HDL 45 (L) 04/30/2021    LDL 39 06/28/2024     (H) 04/30/2021    TRIG 149 06/28/2024    TRIG 152 (H) 04/30/2021    CHOLHDLRATIO 5.5 (H) 03/25/2014       LIVER ENZYME RESULTS:  Lab Results   Component Value Date    AST 18 06/02/2025    AST 15 01/13/2020    ALT 31 06/02/2025    ALT 9 01/28/2020       CBC RESULTS:  Lab Results   Component Value Date    WBC 9.9 06/02/2025    WBC 12.1 (H) 01/01/2021    RBC 4.99 06/02/2025    RBC 4.86 01/01/2021    HGB 14.9 06/02/2025    HGB 14.7 01/01/2021    HCT 43.8 06/02/2025    HCT 45.5 01/01/2021    MCV 88 06/02/2025    MCV 94 01/01/2021    MCH 29.9 06/02/2025    MCH 30.2 01/01/2021    MCHC 34.0 06/02/2025    MCHC 32.3 01/01/2021    RDW 13.1 06/02/2025    RDW 12.9 01/01/2021     06/02/2025     01/01/2021       BMP RESULTS:  Lab Results   Component Value Date     06/02/2025     01/01/2021    POTASSIUM 3.6 06/02/2025    POTASSIUM 3.5 07/23/2022    POTASSIUM 3.6 01/01/2021    CHLORIDE 102 06/02/2025     CHLORIDE 109 07/23/2022    CHLORIDE 109 01/01/2021    CO2 23 06/02/2025    CO2 24 07/23/2022    CO2 25 01/01/2021    ANIONGAP 14 06/02/2025    ANIONGAP 5 07/23/2022    ANIONGAP 7 01/01/2021     (H) 06/02/2025     (H) 07/23/2022    GLC 84 04/30/2021    BUN 14.1 06/02/2025    BUN 25 07/23/2022    BUN 21 01/01/2021    CR 0.83 06/02/2025    CR 0.76 01/01/2021    GFRESTIMATED 77 06/02/2025    GFRESTIMATED 83 01/01/2021    GFRESTBLACK >90 01/01/2021    JACK 8.9 06/02/2025    JACK 8.9 01/01/2021        A1C RESULTS:  Lab Results   Component Value Date    A1C 5.2 04/30/2021       INR RESULTS:  Lab Results   Component Value Date    INR 1.09 06/16/2005           CC  Adeel Griffiths PA-C  [facility]  [address]  [place] [address]

## 2025-06-14 ENCOUNTER — HEALTH MAINTENANCE LETTER (OUTPATIENT)
Age: 65
End: 2025-06-14

## 2025-06-17 ENCOUNTER — TELEPHONE (OUTPATIENT)
Dept: PEDIATRICS | Facility: CLINIC | Age: 65
End: 2025-06-17
Payer: COMMERCIAL

## 2025-06-17 NOTE — TELEPHONE ENCOUNTER
Patient Quality Outreach    Patient is due for the following:   Asthma  -  ACT needed  Depression  -  PHQ-9 needed  Physical Preventive Adult Physical  Labs    Action(s) Taken:   Schedule a Adult Preventative  Patient was assigned appropriate questionnaire to complete    Type of outreach:    Sent [employer] message.    Questions for provider review:    None         Claudia Barnes CMA  Chart routed to None.

## 2025-06-17 NOTE — TELEPHONE ENCOUNTER
Patient Quality Outreach    Patient is due for the following:   Asthma  -  ACT needed and Asthma follow-up visit  Physical Annual Wellness Visit  Dexa, Depression     Action(s) Taken:   Schedule a Annual Wellness Visit    Type of outreach:    Sent [employer] message.    Questions for provider review:    None         Claudia Barnes CMA  Chart routed to None.

## 2025-06-19 DIAGNOSIS — I10 HYPERTENSION GOAL BP (BLOOD PRESSURE) < 140/90: ICD-10-CM

## 2025-06-19 RX ORDER — DILTIAZEM HYDROCHLORIDE 240 MG/1
240 CAPSULE, EXTENDED RELEASE ORAL EVERY MORNING
Qty: 90 CAPSULE | Refills: 3 | Status: SHIPPED | OUTPATIENT
Start: 2025-06-19

## 2025-06-22 ENCOUNTER — HOSPITAL ENCOUNTER (EMERGENCY)
Facility: CLINIC | Age: 65
Discharge: HOME | End: 2025-06-22
Attending: PHYSICIAN ASSISTANT | Admitting: PHYSICIAN ASSISTANT
Payer: COMMERCIAL

## 2025-06-22 VITALS
DIASTOLIC BLOOD PRESSURE: 79 MMHG | SYSTOLIC BLOOD PRESSURE: 158 MMHG | OXYGEN SATURATION: 98 % | TEMPERATURE: 98.6 F | HEIGHT: 69 IN | RESPIRATION RATE: 20 BRPM | BODY MASS INDEX: 31.5 KG/M2 | WEIGHT: 212.68 LBS | HEART RATE: 69 BPM

## 2025-06-22 DIAGNOSIS — R11.0 NAUSEA: ICD-10-CM

## 2025-06-22 DIAGNOSIS — U07.1 COVID-19: ICD-10-CM

## 2025-06-22 LAB
ANION GAP SERPL CALCULATED.3IONS-SCNC: 12 MMOL/L (ref 7–15)
BUN SERPL-MCNC: 21.1 MG/DL (ref 8–23)
CALCIUM SERPL-MCNC: 9.1 MG/DL (ref 8.8–10.4)
CHLORIDE SERPL-SCNC: 102 MMOL/L (ref 98–107)
CREAT SERPL-MCNC: 0.82 MG/DL (ref 0.51–0.95)
EGFRCR SERPLBLD CKD-EPI 2021: 78 ML/MIN/1.73M2
FLUAV RNA SPEC QL NAA+PROBE: NEGATIVE
FLUBV RNA RESP QL NAA+PROBE: NEGATIVE
GLUCOSE SERPL-MCNC: 98 MG/DL (ref 70–99)
HCO3 SERPL-SCNC: 24 MMOL/L (ref 22–29)
POTASSIUM SERPL-SCNC: 3.8 MMOL/L (ref 3.4–5.3)
RSV RNA SPEC NAA+PROBE: NEGATIVE
S PYO DNA THROAT QL NAA+PROBE: NOT DETECTED
SARS-COV-2 RNA RESP QL NAA+PROBE: POSITIVE
SODIUM SERPL-SCNC: 138 MMOL/L (ref 135–145)

## 2025-06-22 PROCEDURE — 250N000011 HC RX IP 250 OP 636: Performed by: PHYSICIAN ASSISTANT

## 2025-06-22 PROCEDURE — 87637 SARSCOV2&INF A&B&RSV AMP PRB: CPT | Performed by: PHYSICIAN ASSISTANT

## 2025-06-22 PROCEDURE — 99283 EMERGENCY DEPT VISIT LOW MDM: CPT

## 2025-06-22 PROCEDURE — 80048 BASIC METABOLIC PNL TOTAL CA: CPT | Performed by: PHYSICIAN ASSISTANT

## 2025-06-22 PROCEDURE — 87651 STREP A DNA AMP PROBE: CPT | Performed by: PHYSICIAN ASSISTANT

## 2025-06-22 PROCEDURE — 36415 COLL VENOUS BLD VENIPUNCTURE: CPT | Performed by: PHYSICIAN ASSISTANT

## 2025-06-22 RX ORDER — ONDANSETRON 4 MG/1
4 TABLET, ORALLY DISINTEGRATING ORAL EVERY 8 HOURS PRN
Qty: 10 TABLET | Refills: 0 | Status: SHIPPED | OUTPATIENT
Start: 2025-06-22

## 2025-06-22 RX ORDER — ONDANSETRON 4 MG/1
4 TABLET, ORALLY DISINTEGRATING ORAL ONCE
Status: COMPLETED | OUTPATIENT
Start: 2025-06-22 | End: 2025-06-22

## 2025-06-22 RX ADMIN — ONDANSETRON 4 MG: 4 TABLET, ORALLY DISINTEGRATING ORAL at 23:27

## 2025-06-22 ASSESSMENT — ACTIVITIES OF DAILY LIVING (ADL)
ADLS_ACUITY_SCORE: 41
ADLS_ACUITY_SCORE: 41

## 2025-06-23 NOTE — ED PROVIDER NOTES
"  Emergency Department Note      History of Present Illness     Chief Complaint   Cough      HPI   Felisha Gorman is a 65 year old female on Eliquis with history of hypertension, hyperlipidemia, and A-fib who presents at the emergency department for evaluation of cough. The patient reports that she has had a runny nose for the past few weeks, but had a low grade temperature 5 days ago while feeling dizzy and weak. She explains that her somewhat productive cough, dizziness, fatigue, and weakness have worsened within the past day. Felisha endorses loss of appetite, and the potential start of a sore throat. She denies shortness of breath or chest pain. The patient endorses history of hypertrophic cardiomyopathy and asthma.     Independent Historian   None    Review of External Notes   None    Past Medical History     Medical History and Problem List   Dysthymic disorder  Essential hypertension  HSV   Hyperlipidemia  Hypertrophic cardiomyopathy  Mild intermittent asthma  Obstructive sleep apnea  Paroxysmal atrial fibrillation  Rheumatic fever    Medications   Spiriva respimat  Crestor  Inderal  Deltasone  Cozaar  Prozac  Zyrtec  Eliquis  Xanax  Albuterol    Surgical History   Tonsillectomy/adenoidectomy  Mammoplasty reduction  Endometrial ablation  Colonoscopy  Abdominoplasty  Endoscopic polypectomy nasal    Physical Exam     Patient Vitals for the past 24 hrs:   BP Temp Temp src Pulse Resp SpO2 Height Weight   06/22/25 2112 (!) 158/79 -- -- -- -- -- -- --   06/22/25 2111 -- 98.6  F (37  C) Temporal 69 20 98 % 1.753 m (5' 9\") 96.5 kg (212 lb 10.8 oz)     Physical Exam  Constitutional: Pleasant. Cooperative.   Eyes: Pupils equally round and reactive  HENT: Head is normal in appearance. Moist mucous membranes. Mild oropharyngeal erythema. No exudates. No palatal asymmetry. Uvula midline. No trismus. No muffled voice. Tolerating their secretions.  Cardiovascular: Regular rate and rhythm.  Respiratory: Normal respiratory " effort, lungs are clear bilaterally.  Neck: Normal ROM.   Skin: Normal, without rash.  Neurologic: Cranial nerves grossly intact. Alert.  Nursing notes and vital signs reviewed.      Diagnostics     Lab Results   Labs Ordered and Resulted from Time of ED Arrival to Time of ED Departure   INFLUENZA A/B, RSV AND SARS-COV2 PCR - Abnormal       Result Value    Influenza A PCR Negative      Influenza B PCR Negative      RSV PCR Negative      SARS CoV2 PCR Positive (*)    BASIC METABOLIC PANEL - Normal    Sodium 138      Potassium 3.8      Chloride 102      Carbon Dioxide (CO2) 24      Anion Gap 12      Urea Nitrogen 21.1      Creatinine 0.82      GFR Estimate 78      Calcium 9.1      Glucose 98     GROUP A STREPTOCOCCUS PCR THROAT SWAB - Normal    Group A strep by PCR Not Detected         Imaging   No orders to display     Independent Interpretation   None    ED Course      Medications Administered   Medications   ondansetron (ZOFRAN ODT) ODT tab 4 mg (4 mg Oral $Given 6/22/25 2326)         Discussion of Management   None    ED Course   ED Course as of 06/22/25 2330   Sun Jun 22, 2025 2219 I obtained history and examined the patient as noted above        Additional Documentation  None    Medical Decision Making / Diagnosis     CMS Diagnoses: None    MIPS   None               MDM   Felisha Gorman is a 65 year old female with history of hypertension, hyperlipidemia, and A-fib who presents at the emergency department for evaluation of cough, rhinorrhea, generalized weakness, fatigue. See HPI as above for additional details. Vitals and physical exam as above. By time of my evaluation, COVID swab had returned positive. This is likely etiology of patient's symptoms. Given weakness, fatigue, poor PO intake, BMP ordered as above, this returns normal, no evidence for electrolyte abnormalities or kidney dysfunction. Patient is overall well appearing at this time. Lungs are CTA. Patient outside of Paxlovid window. Patient does  not appear dehydrated at this time. No suggestion for RVR in setting of patient's Afib history. Patient is able to be up and ambulatory in ED. Do not feel additional work up is indicated at this time. Patient comfortable with this plan. Discussed low threshold to return with severe worsening of symptoms. All questions answered. Patient discharged to home in stable condition.    Disposition   The patient was discharged.     Diagnosis     ICD-10-CM    1. COVID-19  U07.1       2. Nausea  R11.0            Discharge Medications   New Prescriptions    ONDANSETRON (ZOFRAN ODT) 4 MG ODT TAB    Take 1 tablet (4 mg) by mouth every 8 hours as needed for nausea.         Scribe Disclosure:  I, Nedra Quezada, am serving as a scribe at 10:14 PM on 6/22/2025 to document services personally performed by Andi Frank PA-C based on my observations and the provider's statements to me.     This record was created at least in part using electronic voice recognition software, so please excuse any typographical errors.       Andi Frank PA-C  06/22/25 1001

## 2025-06-23 NOTE — DISCHARGE INSTRUCTIONS
Discharge Instructions  COVID-19    COVID-19 is a viral illness that spreads from person-to-person primarily by droplets when an infected person coughs or sneezes and the droplets are then breathed in by another person.    Symptoms of COVID-19  Many people have no symptoms or mild symptoms.  Symptoms usually appear within a few days after contact with a person with COVID-19.  A mild COVID-19 illness is like a cold and can have fever, cough, sneezing, sore throat, tiredness, headache, and muscle pain. Some patients also have stomach symptoms like nausea, vomiting, or diarrhea.  A moderate COVID-19 illness might include shortness of breath or pneumonia on a chest x-ray.  A severe COVID-19 illness causes significant breathing problems such as low oxygen levels or more serious pneumonia.  Some patients experience loss of taste or smell which is somewhat unique to COVID-19.    What should I do if I test positive?  If you test positive for COVID and have no symptoms, you should take precautions, as described below, for five days.  If you test positive for COVID and have symptoms, you should stay home and away from others. You can go back to normal activities when you are feeling better and have been without a fever (without using medications to treat the fever) for 24 hours. When you return to normal activities you should take precautions, as described below, for five days.  Precautions to prevent the spread of COVID-19  Clean Air. Viruses spread from person to person in the air. Bring fresh air into your home by opening doors or windows or using exhaust fans if possible. Use an air filter. Be outdoors when possible.  Practice good hygiene. Cover your mouth and nose with a tissue when you cough or sneeze. Wash your hands often with soap and water for at least 20 seconds or use an       alcohol-based hand  containing at least 60% alcohol. Avoid touching your face. Clean surfaces such as countertops, handrails,  and doorknobs.  Wear a facemask. Masks both prevent an infected person from spreading the virus and prevent a well person from getting the virus. Cloth masks are good, surgical/disposable masks are better, and respirators (N95) are the best.  Practice physical distancing. Avoid being close to someone with cough or other symptoms. Avoid crowded spaces.   What should I do for myself while I am sick?  Treat your symptoms. You can take Acetaminophen (Tylenol) to treat body aches and fever as needed for comfort. Ibuprofen (Advil or Motrin) can be used as well if you still have symptoms after taking Tylenol. Drink fluids. Rest.  Watch for worsening symptoms such as shortness of breath/difficulty breathing or very severe weakness.  Exercise/Sports in rare cases, COVID could affect your heart in a way that makes exercise or participation in sports dangerous.  If you have a mild COVID illness (fever, cough, sore throat, and similar symptoms but no difficulty breathing or abnormalities of the lung): After your COVID symptoms have resolved, you can return to exercise. If you develop difficulty breathing or chest discomfort with exercise, palpitations (a sensation of your heart racing or skipping), or lightheadedness/passing out, you should contact your doctor/clinic.  If you have more than a mild illness (meaning that you have problems with your breathing or lungs) or if you participate in competitive or strenuous activity or have a history of heart disease: Please see your primary doctor/provider prior to return to activity/competition.    COVID treatments such as antiviral medications are available. They are recommended for those patients who have a risk for developing more severe COVID illness. Age is the biggest risk factor. Risk is increased for adults greater than 50 years old and particularly for adults greater than 65 years. Importantly, the treatments must be started early in the illness (within five days). These  treatments may have been considered today during your visit. If you have other questions, contact your primary doctor/clinic.    You can learn more about COVID treatments from the [facility]:  [email or web address]/coronavirus/meds.html            What should I do if I am exposed to COVID?  If you are exposed to COVID, you should monitor for symptoms and test if you develop symptoms. Practicing the precautions discussed above are a good idea, particularly if you plan to be around any person who is at higher risk of COVID complications such as older patients (>65) or people with significant medical problems.            Return to the Emergency Department if:  If you are developing worsening breathing, weakness, or feel worse you should seek medical attention.  If you are uncertain, contact your health care provider/clinic. If you need emergency medical attention, call 911.

## 2025-06-28 ENCOUNTER — HOSPITAL ENCOUNTER (EMERGENCY)
Facility: CLINIC | Age: 65
Discharge: HOME | End: 2025-06-29
Attending: EMERGENCY MEDICINE | Admitting: EMERGENCY MEDICINE
Payer: COMMERCIAL

## 2025-06-28 VITALS
WEIGHT: 209.44 LBS | BODY MASS INDEX: 31.02 KG/M2 | OXYGEN SATURATION: 97 % | RESPIRATION RATE: 18 BRPM | HEIGHT: 69 IN | HEART RATE: 74 BPM | SYSTOLIC BLOOD PRESSURE: 142 MMHG | TEMPERATURE: 98.7 F | DIASTOLIC BLOOD PRESSURE: 70 MMHG

## 2025-06-28 DIAGNOSIS — K62.5 BRIGHT RED BLOOD PER RECTUM: ICD-10-CM

## 2025-06-28 LAB
ABO + RH BLD: NORMAL
ALBUMIN SERPL BCG-MCNC: 4.5 G/DL (ref 3.5–5.2)
ALP SERPL-CCNC: 100 U/L (ref 40–150)
ALT SERPL W P-5'-P-CCNC: 40 U/L (ref 0–50)
ANION GAP SERPL CALCULATED.3IONS-SCNC: 12 MMOL/L (ref 7–15)
AST SERPL W P-5'-P-CCNC: 25 U/L (ref 0–45)
BASOPHILS # BLD AUTO: 0.1 10E3/UL (ref 0–0.2)
BASOPHILS NFR BLD AUTO: 1 %
BILIRUB SERPL-MCNC: 0.4 MG/DL
BLD GP AB SCN SERPL QL: NEGATIVE
BUN SERPL-MCNC: 24.1 MG/DL (ref 8–23)
CALCIUM SERPL-MCNC: 10 MG/DL (ref 8.8–10.4)
CHLORIDE SERPL-SCNC: 106 MMOL/L (ref 98–107)
CREAT SERPL-MCNC: 1.08 MG/DL (ref 0.51–0.95)
EGFRCR SERPLBLD CKD-EPI 2021: 56 ML/MIN/1.73M2
EOSINOPHIL # BLD AUTO: 0.4 10E3/UL (ref 0–0.7)
EOSINOPHIL NFR BLD AUTO: 3 %
ERYTHROCYTE [DISTWIDTH] IN BLOOD BY AUTOMATED COUNT: 12.9 % (ref 10–15)
GLUCOSE SERPL-MCNC: 109 MG/DL (ref 70–99)
HCO3 SERPL-SCNC: 24 MMOL/L (ref 22–29)
HCT VFR BLD AUTO: 41.9 % (ref 35–47)
HGB BLD-MCNC: 14.2 G/DL (ref 11.7–15.7)
HOLD SPECIMEN: NORMAL
HOLD SPECIMEN: NORMAL
IMM GRANULOCYTES # BLD: 0 10E3/UL
IMM GRANULOCYTES NFR BLD: 0 %
LIPASE SERPL-CCNC: 33 U/L (ref 13–60)
LYMPHOCYTES # BLD AUTO: 2.9 10E3/UL (ref 0.8–5.3)
LYMPHOCYTES NFR BLD AUTO: 26 %
MCH RBC QN AUTO: 30 PG (ref 26.5–33)
MCHC RBC AUTO-ENTMCNC: 33.9 G/DL (ref 31.5–36.5)
MCV RBC AUTO: 88 FL (ref 78–100)
MONOCYTES # BLD AUTO: 1 10E3/UL (ref 0–1.3)
MONOCYTES NFR BLD AUTO: 8 %
NEUTROPHILS # BLD AUTO: 7 10E3/UL (ref 1.6–8.3)
NEUTROPHILS NFR BLD AUTO: 61 %
NRBC # BLD AUTO: 0 10E3/UL
NRBC BLD AUTO-RTO: 0 /100
PLATELET # BLD AUTO: 443 10E3/UL (ref 150–450)
POTASSIUM SERPL-SCNC: 4.4 MMOL/L (ref 3.4–5.3)
PROT SERPL-MCNC: 7.4 G/DL (ref 6.4–8.3)
RBC # BLD AUTO: 4.74 10E6/UL (ref 3.8–5.2)
SODIUM SERPL-SCNC: 142 MMOL/L (ref 135–145)
SPECIMEN EXP DATE BLD: NORMAL
WBC # BLD AUTO: 11.4 10E3/UL (ref 4–11)

## 2025-06-28 PROCEDURE — 85025 COMPLETE CBC W/AUTO DIFF WBC: CPT | Performed by: EMERGENCY MEDICINE

## 2025-06-28 PROCEDURE — 83690 ASSAY OF LIPASE: CPT | Performed by: EMERGENCY MEDICINE

## 2025-06-28 PROCEDURE — 80053 COMPREHEN METABOLIC PANEL: CPT | Performed by: EMERGENCY MEDICINE

## 2025-06-28 PROCEDURE — 99283 EMERGENCY DEPT VISIT LOW MDM: CPT

## 2025-06-28 PROCEDURE — 86901 BLOOD TYPING SEROLOGIC RH(D): CPT | Performed by: EMERGENCY MEDICINE

## 2025-06-28 PROCEDURE — 86900 BLOOD TYPING SEROLOGIC ABO: CPT | Performed by: EMERGENCY MEDICINE

## 2025-06-28 PROCEDURE — 36415 COLL VENOUS BLD VENIPUNCTURE: CPT | Performed by: EMERGENCY MEDICINE

## 2025-06-28 PROCEDURE — 80048 BASIC METABOLIC PNL TOTAL CA: CPT | Performed by: EMERGENCY MEDICINE

## 2025-06-28 ASSESSMENT — ACTIVITIES OF DAILY LIVING (ADL)
ADLS_ACUITY_SCORE: 41
ADLS_ACUITY_SCORE: 41

## 2025-06-29 NOTE — ED PROVIDER NOTES
"  Emergency Department Note      History of Present Illness     Chief Complaint   Rectal Bleeding      HPI   Felisha Gorman is a 65 year old who presents to the ER for evaluation of bright red blood per rectum.  This has been going on intermittently over the past year or so but picked up this morning after a harder bowel movement.  She notices the blood when she wipes but is not bleeding through the underwear.  She has had chronic issues with digestion and GERD and hard stools and intermittent abdominal cramping.  She reports that her last colonoscopy was sooner than 2009 but aside from polyps she cannot remember any other abnormalities.  She does take a blood thinner at baseline.  She reports that she saw a gastroenterologist from [facility] over this past year and went in for a colonoscopy, but on the first attempt her blood pressure was too high and on the second attempt they commented that she had \"heart block\" and thus was told that she needed to have this procedure done in a hospital setting.  She did see a cardiologist in between the 1st and 2nd colonoscopy attempts and was told she has hypertrophic cardiomyopathy.  Apparently the cardiologist cleared her for colonoscopy.  She reached out to [facility] and received a letter that due to high demand that a GI doctor could not see her.    Independent Historian   None    Review of External Notes   I reviewed 6/22/2025 ER visit when she was diagnosed with COVID-19 infection.      I reviewed the 7/27/2009 [facility] colonoscopy report describing identification of colonic polyp.  Past Medical History     Medical History and Problem List   Past Medical History:   Diagnosis Date    Dysthymic disorder     Essential hypertension, benign     HSV (herpes simplex virus) anogenital infection     Hyperlipidemia     Hypertrophic cardiomyopathy     Mild intermittent asthma     Obstructive sleep apnea     Palpitations - PVC and PVC " "bigmeny     Paroxysmal atrial fibrillation     Rheumatic fever, history of        Medications   albuterol (PROAIR HFA/PROVENTIL HFA/VENTOLIN HFA) 108 (90 Base) MCG/ACT inhaler  albuterol (VENTOLIN HFA) 108 (90 Base) MCG/ACT inhaler  ALPRAZolam (XANAX) 0.5 MG tablet  apixaban ANTICOAGULANT (ELIQUIS ANTICOAGULANT) 5 MG tablet  ARNUITY ELLIPTA 100 MCG/ACT inhaler  cetirizine (ZYRTEC) 10 MG tablet  diltiazem ER (DILT-XR) 240 MG 24 hr ER beaded capsule  FLUoxetine (PROZAC) 10 MG capsule  losartan (COZAAR) 25 MG tablet  omeprazole (PRILOSEC) 20 MG DR capsule  ondansetron (ZOFRAN ODT) 4 MG ODT tab  predniSONE (DELTASONE) 20 MG tablet  propranolol (INDERAL) 10 MG tablet  rosuvastatin (CRESTOR) 10 MG tablet  tiotropium (SPIRIVA RESPIMAT) 2.5 MCG/ACT inhaler        Surgical History   Past Surgical History:   Procedure Laterality Date    ABDOMINOPLASTY  2017    COLONOSCOPY  07/2009    hyperplastic polyp; repeat 10 years    COLONOSCOPY  12/2020    repeat 5 years; tubular adenoma    ENDOMETRIAL ABLATION  2006    ENDOSCOPIC POLYPECTOMY NASAL  2002    MAMMOPLASTY REDUCTION Bilateral 2017    TONSILLECTOMY         Physical Exam     Patient Vitals for the past 24 hrs:   BP Temp Temp src Pulse Resp SpO2 Height Weight   06/28/25 2049 (!) 142/70 98.7  F (37.1  C) Temporal 74 18 97 % 1.753 m (5' 9\") 95 kg (209 lb 7 oz)     Physical Exam  VS: Reviewed per above  HENT: Mucous membranes moist  EYES: sclera anicteric  CV: Rate as noted,  regular rhythm.   RESP: Effort normal. Breath sounds are normal bilaterally.  GI: no focal tenderness/rebound/guarding, not distended.  Chaperoned rectal exam: Few areas of mild anal fissure noted in the rectal area and that are tender to palpation.  No evidence of blood around the rectal area on exam  NEURO: Alert, moving all extremities  MSK: No deformity of the extremities  SKIN: Warm and dry    Diagnostics     Lab Results   Labs Ordered and Resulted from Time of ED Arrival to Time of ED Departure "   COMPREHENSIVE METABOLIC PANEL - Abnormal       Result Value    Sodium 142      Potassium 4.4      Carbon Dioxide (CO2) 24      Anion Gap 12      Urea Nitrogen 24.1 (*)     Creatinine 1.08 (*)     GFR Estimate 56 (*)     Calcium 10.0      Chloride 106      Glucose 109 (*)     Alkaline Phosphatase 100      AST 25      ALT 40      Protein Total 7.4      Albumin 4.5      Bilirubin Total 0.4     CBC WITH PLATELETS AND DIFFERENTIAL - Abnormal    WBC Count 11.4 (*)     RBC Count 4.74      Hemoglobin 14.2      Hematocrit 41.9      MCV 88      MCH 30.0      MCHC 33.9      RDW 12.9      Platelet Count 443      % Neutrophils 61      % Lymphocytes 26      % Monocytes 8      % Eosinophils 3      % Basophils 1      % Immature Granulocytes 0      NRBCs per 100 WBC 0      Absolute Neutrophils 7.0      Absolute Lymphocytes 2.9      Absolute Monocytes 1.0      Absolute Eosinophils 0.4      Absolute Basophils 0.1      Absolute Immature Granulocytes 0.0      Absolute NRBCs 0.0     LIPASE - Normal    Lipase 33     TYPE AND SCREEN, ADULT    ABO/RH(D) O POS      Antibody Screen Negative      SPECIMEN EXPIRATION DATE 7/1/2025 11:59:00 PM CDT     ABO/RH TYPE AND SCREEN       Imaging   No orders to display       EKG     Independent Interpretation   None    ED Course      Medications Administered   Medications - No data to display    Procedures   Procedures     Discussion of Management   None    ED Course        Additional Documentation  None    Medical Decision Making / Diagnosis     CMS Diagnoses: None    MIPS   None               MDM   Felisha Gorman is a 65 year old female who presents to the ER for evaluation of intermittent blood per rectum over the past number of months to year that picked up earlier in the evening.  Vital signs are reassuring.  Abdominal exam benign.  No active rectal bleeding here in the ER.  She does have some signs of anal fissure that could be contributing to her bleeding.  Her Eliquis use certainly exacerbates  any underlying bleeding.  Hemoglobin is stable. No signs brisk GI bleed at this time.  No emergent indication for hospitalization, but I will place a referral to gastroenterology in the [place] system to try and expedite her evaluation as she clearly needs a colonoscopy given prolonged bleeding.  We also discussed trialing stool softeners at home to help minimize the chance of bleeding from hard stool.  Return precautions discussed.    Disposition   The patient was discharged.     Diagnosis     ICD-10-CM    1. Bright red blood per rectum  K62.5            Discharge Medications   Discharge Medication List as of 6/28/2025 11:57 PM                   Kit Lopez MD  06/29/25 0343

## 2025-06-29 NOTE — ED TRIAGE NOTES
Pt report she always has hard stools. Pt reports rectal bleeding started at 11 am. Pt reports she never saw blood int he stool, noticed bleeding when she wipes. Pt denies any new abd pain or cramping

## 2025-07-02 ENCOUNTER — PATIENT OUTREACH (OUTPATIENT)
Dept: CARE COORDINATION | Facility: CLINIC | Age: 65
End: 2025-07-02
Payer: COMMERCIAL

## 2025-07-05 ENCOUNTER — PATIENT OUTREACH (OUTPATIENT)
Dept: CARE COORDINATION | Facility: CLINIC | Age: 65
End: 2025-07-05
Payer: COMMERCIAL

## 2025-07-11 DIAGNOSIS — I48.91 NEW ONSET A-FIB (H): ICD-10-CM

## 2025-07-15 NOTE — TELEPHONE ENCOUNTER
Diagnosis, Referred by & from: Bright red blood per rectum / appt per pt / no outside rec    Appt date: 8/6/25   NOTES STATUS DETAILS   OFFICE NOTE from referring provider N/A    OFFICE NOTE from other specialist Internal [facility]:  6/10/25 - [facility] Cardiology w/ Maranda Mensah DO     2/28/23 - [facility] FP w/ Rishi Donovan MD     4/12/18 - [facility] IM w/ Naomi Archibald MD   DISCHARGE SUMMARY from hospital N/A    DISCHARGE REPORT from the ER Internal [facility]:  6/28/25 - ED note w/ Kit Lopez MD   6/2/25 - ED note w/ Adeel Griffiths PA-C   8/18/24 - ED note w/ Violetta Dubon MD    OPERATIVE REPORT N/A    MEDICATION LIST Internal    LABS     ANAL PAP/CEA N/A    BIOPSIES/PATHOLOGY RELATED TO DIAGNOSIS N/A    DIAGNOSTIC PROCEDURES     PFC TESTING (from the Pelvic floor center includes Manometry, PDNL, EMG, etc.) N/A    COLONOSCOPY (most recent all time after 5 years) Care Everywhere [facility]:  12/28/20   FLEX SIGMOIDOSCOPY N/A    UPPER ENDOSCOPY (EGD) N/A    ERCP N/A    IMAGING (DISC & REPORT)      CT N/A    MRI N/A    XRAY Internal [facility]:  12/6/22 - XR ABDOMEN    ULTRASOUND  (ENDOANAL/ENDORECTAL) N/A

## 2025-07-21 NOTE — TELEPHONE ENCOUNTER
Patient has NOT scheduled.    3rd attempt would be a letter. Okay with letter?  You would have to deny or approve med for this encounter to be closed.    Thank you kindly,  Joshua STODDARD

## 2025-07-21 NOTE — TELEPHONE ENCOUNTER
Call pharmacy. She is overdue for appointment.   She won't return our phone calls. Can they call and inform her that we won't prescribe medication until she calls to schedule an appointment.   Sue Engle PA-C

## 2025-07-22 ENCOUNTER — TRANSCRIBE ORDERS (OUTPATIENT)
Dept: OTHER | Age: 65
End: 2025-07-22

## 2025-07-22 ENCOUNTER — TELEPHONE (OUTPATIENT)
Dept: GASTROENTEROLOGY | Facility: CLINIC | Age: 65
End: 2025-07-22
Payer: COMMERCIAL

## 2025-07-22 DIAGNOSIS — R10.84 ABDOMINAL PAIN, GENERALIZED: Primary | ICD-10-CM

## 2025-07-22 RX ORDER — APIXABAN 5 MG/1
5 TABLET, FILM COATED ORAL 2 TIMES DAILY
Qty: 60 TABLET | Refills: 0 | Status: SHIPPED | OUTPATIENT
Start: 2025-07-22

## 2025-07-22 NOTE — TELEPHONE ENCOUNTER
Left message for patient to call back.     When patient calls back:  Please advised of refill for Eliquis.  Please cancel appointment with ROBERT Engle  Keep physical scheduled with PCP.    Thanks!  Shahnaz ARAYA RN, BSN  Clinic RN  [facility]

## 2025-07-22 NOTE — TELEPHONE ENCOUNTER
RN called and spoke with patient. Patient is agreeable to scheduling. Scheduled for annual w/ PCP 8/1/25. RN scheduled a follow up appointment as med check w/ extender 7/25/25 in case this is needed. Patient will run out of eliquis after Thursday. Ok for refill until annual and cancel follow up?     Montrell CHEN RN 7/22/2025 at 1:10 PM

## 2025-07-22 NOTE — TELEPHONE ENCOUNTER
Please cancel appointment with me. Keep appointment with Dr. Rodas. Refill of eliquis given.   Thank you so much!   Sue Engle PA-C

## 2025-08-01 ENCOUNTER — ANCILLARY PROCEDURE (OUTPATIENT)
Dept: BONE DENSITY | Facility: CLINIC | Age: 65
End: 2025-08-01
Attending: INTERNAL MEDICINE
Payer: COMMERCIAL

## 2025-08-01 DIAGNOSIS — Z78.0 ASYMPTOMATIC POSTMENOPAUSAL STATUS: ICD-10-CM

## 2025-08-01 PROBLEM — J45.50 SEVERE PERSISTENT ASTHMA WITHOUT COMPLICATION (H): Status: ACTIVE | Noted: 2025-08-01

## 2025-08-01 PROCEDURE — 77080 DXA BONE DENSITY AXIAL: CPT | Mod: TC | Performed by: NURSE PRACTITIONER

## 2025-08-06 ENCOUNTER — PRE VISIT (OUTPATIENT)
Dept: SURGERY | Facility: CLINIC | Age: 65
End: 2025-08-06